# Patient Record
Sex: MALE | Race: WHITE | Employment: OTHER | URBAN - METROPOLITAN AREA
[De-identification: names, ages, dates, MRNs, and addresses within clinical notes are randomized per-mention and may not be internally consistent; named-entity substitution may affect disease eponyms.]

---

## 2021-09-14 ENCOUNTER — HOSPITAL ENCOUNTER (INPATIENT)
Age: 66
LOS: 9 days | Discharge: HOME HEALTH | DRG: 720 | End: 2021-09-23
Attending: EMERGENCY MEDICINE | Admitting: INTERNAL MEDICINE
Payer: COMMERCIAL

## 2021-09-14 ENCOUNTER — APPOINTMENT (OUTPATIENT)
Dept: CT IMAGING | Age: 66
DRG: 720 | End: 2021-09-14
Payer: COMMERCIAL

## 2021-09-14 ENCOUNTER — APPOINTMENT (OUTPATIENT)
Dept: GENERAL RADIOLOGY | Age: 66
DRG: 720 | End: 2021-09-14
Payer: COMMERCIAL

## 2021-09-14 DIAGNOSIS — J12.82 PNEUMONIA DUE TO COVID-19 VIRUS: Primary | ICD-10-CM

## 2021-09-14 DIAGNOSIS — A41.9 SEPTICEMIA (HCC): ICD-10-CM

## 2021-09-14 DIAGNOSIS — J44.9 CHRONIC OBSTRUCTIVE PULMONARY DISEASE, UNSPECIFIED COPD TYPE (HCC): ICD-10-CM

## 2021-09-14 DIAGNOSIS — U07.1 PNEUMONIA DUE TO COVID-19 VIRUS: Primary | ICD-10-CM

## 2021-09-14 PROBLEM — Z20.822 SUSPECTED COVID-19 VIRUS INFECTION: Status: ACTIVE | Noted: 2021-09-14

## 2021-09-14 PROBLEM — J18.9 MULTIFOCAL PNEUMONIA: Status: ACTIVE | Noted: 2021-09-14

## 2021-09-14 PROBLEM — J96.01 ACUTE RESPIRATORY FAILURE WITH HYPOXIA (HCC): Status: ACTIVE | Noted: 2021-09-14

## 2021-09-14 PROBLEM — J96.21 ACUTE ON CHRONIC RESPIRATORY FAILURE WITH HYPOXIA (HCC): Status: ACTIVE | Noted: 2021-09-14

## 2021-09-14 LAB
A/G RATIO: 0.9 (ref 1.1–2.2)
ALBUMIN SERPL-MCNC: 3.7 G/DL (ref 3.4–5)
ALP BLD-CCNC: 93 U/L (ref 40–129)
ALT SERPL-CCNC: 51 U/L (ref 10–40)
ANION GAP SERPL CALCULATED.3IONS-SCNC: 13 MMOL/L (ref 3–16)
AST SERPL-CCNC: 79 U/L (ref 15–37)
BANDED NEUTROPHILS RELATIVE PERCENT: 3 % (ref 0–7)
BASE EXCESS ARTERIAL: -3.1 MMOL/L (ref -3–3)
BASOPHILS ABSOLUTE: 0 K/UL (ref 0–0.2)
BASOPHILS RELATIVE PERCENT: 0 %
BILIRUB SERPL-MCNC: 1.1 MG/DL (ref 0–1)
BUN BLDV-MCNC: 13 MG/DL (ref 7–20)
CALCIUM SERPL-MCNC: 8.7 MG/DL (ref 8.3–10.6)
CARBOXYHEMOGLOBIN ARTERIAL: 1.3 % (ref 0–1.5)
CHLORIDE BLD-SCNC: 87 MMOL/L (ref 99–110)
CO2: 27 MMOL/L (ref 21–32)
CREAT SERPL-MCNC: 0.8 MG/DL (ref 0.8–1.3)
EKG ATRIAL RATE: 98 BPM
EKG DIAGNOSIS: NORMAL
EKG P AXIS: 67 DEGREES
EKG P-R INTERVAL: 144 MS
EKG Q-T INTERVAL: 372 MS
EKG QRS DURATION: 100 MS
EKG QTC CALCULATION (BAZETT): 474 MS
EKG R AXIS: 264 DEGREES
EKG T AXIS: 82 DEGREES
EKG VENTRICULAR RATE: 98 BPM
EOSINOPHILS ABSOLUTE: 0 K/UL (ref 0–0.6)
EOSINOPHILS RELATIVE PERCENT: 0 %
GFR AFRICAN AMERICAN: >60
GFR NON-AFRICAN AMERICAN: >60
GLOBULIN: 4.1 G/DL
GLUCOSE BLD-MCNC: 132 MG/DL (ref 70–99)
HCO3 ARTERIAL: 27.6 MMOL/L (ref 21–29)
HCT VFR BLD CALC: 48.1 % (ref 40.5–52.5)
HEMATOLOGY PATH CONSULT: YES
HEMOGLOBIN, ART, EXTENDED: 16.4 G/DL (ref 13.5–17.5)
HEMOGLOBIN: 16.5 G/DL (ref 13.5–17.5)
INFLUENZA A: NOT DETECTED
INFLUENZA B: NOT DETECTED
LACTIC ACID, SEPSIS: 2 MMOL/L (ref 0.4–1.9)
LACTIC ACID, SEPSIS: 2.1 MMOL/L (ref 0.4–1.9)
LYMPHOCYTES ABSOLUTE: 1.8 K/UL (ref 1–5.1)
LYMPHOCYTES RELATIVE PERCENT: 10 %
MCH RBC QN AUTO: 33.1 PG (ref 26–34)
MCHC RBC AUTO-ENTMCNC: 34.3 G/DL (ref 31–36)
MCV RBC AUTO: 96.7 FL (ref 80–100)
METHEMOGLOBIN ARTERIAL: 0 %
MONOCYTES ABSOLUTE: 2.5 K/UL (ref 0–1.3)
MONOCYTES RELATIVE PERCENT: 14 %
NEUTROPHILS ABSOLUTE: 13.5 K/UL (ref 1.7–7.7)
NEUTROPHILS RELATIVE PERCENT: 73 %
O2 SAT, ARTERIAL: 95.7 %
O2 THERAPY: ABNORMAL
PCO2 ARTERIAL: 75.1 MMHG (ref 35–45)
PDW BLD-RTO: 13.7 % (ref 12.4–15.4)
PH ARTERIAL: 7.18 (ref 7.35–7.45)
PLATELET # BLD: 169 K/UL (ref 135–450)
PLATELET SLIDE REVIEW: ADEQUATE
PMV BLD AUTO: 8.4 FL (ref 5–10.5)
PO2 ARTERIAL: 99.6 MMHG (ref 75–108)
POTASSIUM REFLEX MAGNESIUM: 4.9 MMOL/L (ref 3.5–5.1)
PRO-BNP: 660 PG/ML (ref 0–124)
PROCALCITONIN: 1.02 NG/ML (ref 0–0.15)
RBC # BLD: 4.98 M/UL (ref 4.2–5.9)
SARS-COV-2 RNA, RT PCR: NOT DETECTED
SARS-COV-2, NAAT: NOT DETECTED
SLIDE REVIEW: ABNORMAL
SODIUM BLD-SCNC: 127 MMOL/L (ref 136–145)
TCO2 ARTERIAL: 29.9 MMOL/L
TOTAL PROTEIN: 7.8 G/DL (ref 6.4–8.2)
TROPONIN: <0.01 NG/ML
VACUOLATED NEUTROPHILS: PRESENT
WBC # BLD: 17.7 K/UL (ref 4–11)

## 2021-09-14 PROCEDURE — 96361 HYDRATE IV INFUSION ADD-ON: CPT

## 2021-09-14 PROCEDURE — 93005 ELECTROCARDIOGRAM TRACING: CPT | Performed by: PHYSICIAN ASSISTANT

## 2021-09-14 PROCEDURE — 82077 ASSAY SPEC XCP UR&BREATH IA: CPT

## 2021-09-14 PROCEDURE — 2000000000 HC ICU R&B

## 2021-09-14 PROCEDURE — 6360000002 HC RX W HCPCS: Performed by: EMERGENCY MEDICINE

## 2021-09-14 PROCEDURE — 96375 TX/PRO/DX INJ NEW DRUG ADDON: CPT

## 2021-09-14 PROCEDURE — 0BH17EZ INSERTION OF ENDOTRACHEAL AIRWAY INTO TRACHEA, VIA NATURAL OR ARTIFICIAL OPENING: ICD-10-PCS | Performed by: INTERNAL MEDICINE

## 2021-09-14 PROCEDURE — 6360000002 HC RX W HCPCS: Performed by: INTERNAL MEDICINE

## 2021-09-14 PROCEDURE — 85025 COMPLETE CBC W/AUTO DIFF WBC: CPT

## 2021-09-14 PROCEDURE — 6360000002 HC RX W HCPCS

## 2021-09-14 PROCEDURE — 93010 ELECTROCARDIOGRAM REPORT: CPT | Performed by: INTERNAL MEDICINE

## 2021-09-14 PROCEDURE — 71260 CT THORAX DX C+: CPT

## 2021-09-14 PROCEDURE — 84145 PROCALCITONIN (PCT): CPT

## 2021-09-14 PROCEDURE — 94660 CPAP INITIATION&MGMT: CPT

## 2021-09-14 PROCEDURE — 82803 BLOOD GASES ANY COMBINATION: CPT

## 2021-09-14 PROCEDURE — 83880 ASSAY OF NATRIURETIC PEPTIDE: CPT

## 2021-09-14 PROCEDURE — 80053 COMPREHEN METABOLIC PANEL: CPT

## 2021-09-14 PROCEDURE — 6370000000 HC RX 637 (ALT 250 FOR IP): Performed by: INTERNAL MEDICINE

## 2021-09-14 PROCEDURE — 96374 THER/PROPH/DIAG INJ IV PUSH: CPT

## 2021-09-14 PROCEDURE — 84484 ASSAY OF TROPONIN QUANT: CPT

## 2021-09-14 PROCEDURE — 6360000002 HC RX W HCPCS: Performed by: PHYSICIAN ASSISTANT

## 2021-09-14 PROCEDURE — 96366 THER/PROPH/DIAG IV INF ADDON: CPT

## 2021-09-14 PROCEDURE — 2500000003 HC RX 250 WO HCPCS: Performed by: INTERNAL MEDICINE

## 2021-09-14 PROCEDURE — 36600 WITHDRAWAL OF ARTERIAL BLOOD: CPT

## 2021-09-14 PROCEDURE — 36415 COLL VENOUS BLD VENIPUNCTURE: CPT

## 2021-09-14 PROCEDURE — 2580000003 HC RX 258: Performed by: INTERNAL MEDICINE

## 2021-09-14 PROCEDURE — 94761 N-INVAS EAR/PLS OXIMETRY MLT: CPT

## 2021-09-14 PROCEDURE — 94002 VENT MGMT INPAT INIT DAY: CPT

## 2021-09-14 PROCEDURE — 71045 X-RAY EXAM CHEST 1 VIEW: CPT

## 2021-09-14 PROCEDURE — 99255 IP/OBS CONSLTJ NEW/EST HI 80: CPT | Performed by: INTERNAL MEDICINE

## 2021-09-14 PROCEDURE — 99285 EMERGENCY DEPT VISIT HI MDM: CPT

## 2021-09-14 PROCEDURE — 83605 ASSAY OF LACTIC ACID: CPT

## 2021-09-14 PROCEDURE — 6370000000 HC RX 637 (ALT 250 FOR IP): Performed by: PHYSICIAN ASSISTANT

## 2021-09-14 PROCEDURE — 2500000003 HC RX 250 WO HCPCS

## 2021-09-14 PROCEDURE — 6360000004 HC RX CONTRAST MEDICATION: Performed by: PHYSICIAN ASSISTANT

## 2021-09-14 PROCEDURE — 2580000003 HC RX 258: Performed by: PHYSICIAN ASSISTANT

## 2021-09-14 PROCEDURE — 96365 THER/PROPH/DIAG IV INF INIT: CPT

## 2021-09-14 PROCEDURE — 87635 SARS-COV-2 COVID-19 AMP PRB: CPT

## 2021-09-14 PROCEDURE — 96367 TX/PROPH/DG ADDL SEQ IV INF: CPT

## 2021-09-14 PROCEDURE — 5A1945Z RESPIRATORY VENTILATION, 24-96 CONSECUTIVE HOURS: ICD-10-PCS | Performed by: INTERNAL MEDICINE

## 2021-09-14 PROCEDURE — 94640 AIRWAY INHALATION TREATMENT: CPT

## 2021-09-14 PROCEDURE — 2700000000 HC OXYGEN THERAPY PER DAY

## 2021-09-14 PROCEDURE — 87040 BLOOD CULTURE FOR BACTERIA: CPT

## 2021-09-14 PROCEDURE — 87636 SARSCOV2 & INF A&B AMP PRB: CPT

## 2021-09-14 PROCEDURE — 2580000003 HC RX 258: Performed by: EMERGENCY MEDICINE

## 2021-09-14 RX ORDER — GUAIFENESIN/DEXTROMETHORPHAN 100-10MG/5
5 SYRUP ORAL EVERY 4 HOURS PRN
Status: DISCONTINUED | OUTPATIENT
Start: 2021-09-14 | End: 2021-09-23 | Stop reason: HOSPADM

## 2021-09-14 RX ORDER — ONDANSETRON 4 MG/1
4 TABLET, ORALLY DISINTEGRATING ORAL EVERY 8 HOURS PRN
Status: DISCONTINUED | OUTPATIENT
Start: 2021-09-14 | End: 2021-09-14

## 2021-09-14 RX ORDER — ALBUTEROL SULFATE 2.5 MG/3ML
SOLUTION RESPIRATORY (INHALATION)
Status: DISPENSED
Start: 2021-09-14 | End: 2021-09-15

## 2021-09-14 RX ORDER — LORAZEPAM 2 MG/ML
3 INJECTION INTRAMUSCULAR
Status: DISCONTINUED | OUTPATIENT
Start: 2021-09-14 | End: 2021-09-23 | Stop reason: HOSPADM

## 2021-09-14 RX ORDER — SODIUM CHLORIDE 0.9 % (FLUSH) 0.9 %
5-40 SYRINGE (ML) INJECTION EVERY 12 HOURS SCHEDULED
Status: DISCONTINUED | OUTPATIENT
Start: 2021-09-14 | End: 2021-09-14 | Stop reason: SDUPTHER

## 2021-09-14 RX ORDER — LORAZEPAM 1 MG/1
1 TABLET ORAL
Status: DISCONTINUED | OUTPATIENT
Start: 2021-09-14 | End: 2021-09-23 | Stop reason: HOSPADM

## 2021-09-14 RX ORDER — LORAZEPAM 2 MG/ML
2 INJECTION INTRAMUSCULAR
Status: DISCONTINUED | OUTPATIENT
Start: 2021-09-14 | End: 2021-09-23 | Stop reason: HOSPADM

## 2021-09-14 RX ORDER — LORAZEPAM 2 MG/ML
1 INJECTION INTRAMUSCULAR
Status: DISCONTINUED | OUTPATIENT
Start: 2021-09-14 | End: 2021-09-23 | Stop reason: HOSPADM

## 2021-09-14 RX ORDER — LORAZEPAM 2 MG/ML
INJECTION INTRAMUSCULAR
Status: DISPENSED
Start: 2021-09-14 | End: 2021-09-15

## 2021-09-14 RX ORDER — NICOTINE 21 MG/24HR
1 PATCH, TRANSDERMAL 24 HOURS TRANSDERMAL DAILY
Status: DISCONTINUED | OUTPATIENT
Start: 2021-09-14 | End: 2021-09-23 | Stop reason: HOSPADM

## 2021-09-14 RX ORDER — SODIUM CHLORIDE 0.9 % (FLUSH) 0.9 %
5-40 SYRINGE (ML) INJECTION EVERY 12 HOURS SCHEDULED
Status: DISCONTINUED | OUTPATIENT
Start: 2021-09-14 | End: 2021-09-23 | Stop reason: HOSPADM

## 2021-09-14 RX ORDER — LORAZEPAM 2 MG/ML
1 INJECTION INTRAMUSCULAR ONCE
Status: DISCONTINUED | OUTPATIENT
Start: 2021-09-14 | End: 2021-09-15

## 2021-09-14 RX ORDER — M-VIT,TX,IRON,MINS/CALC/FOLIC 27MG-0.4MG
1 TABLET ORAL DAILY
Status: DISCONTINUED | OUTPATIENT
Start: 2021-09-15 | End: 2021-09-15

## 2021-09-14 RX ORDER — 0.9 % SODIUM CHLORIDE 0.9 %
30 INTRAVENOUS SOLUTION INTRAVENOUS ONCE
Status: COMPLETED | OUTPATIENT
Start: 2021-09-14 | End: 2021-09-14

## 2021-09-14 RX ORDER — SODIUM CHLORIDE 0.9 % (FLUSH) 0.9 %
5-40 SYRINGE (ML) INJECTION PRN
Status: DISCONTINUED | OUTPATIENT
Start: 2021-09-14 | End: 2021-09-14 | Stop reason: SDUPTHER

## 2021-09-14 RX ORDER — SODIUM CHLORIDE 9 MG/ML
25 INJECTION, SOLUTION INTRAVENOUS PRN
Status: DISCONTINUED | OUTPATIENT
Start: 2021-09-14 | End: 2021-09-23 | Stop reason: HOSPADM

## 2021-09-14 RX ORDER — FOLIC ACID 1 MG/1
1 TABLET ORAL DAILY
Status: DISCONTINUED | OUTPATIENT
Start: 2021-09-15 | End: 2021-09-15

## 2021-09-14 RX ORDER — SODIUM CHLORIDE 0.9 % (FLUSH) 0.9 %
5-40 SYRINGE (ML) INJECTION PRN
Status: DISCONTINUED | OUTPATIENT
Start: 2021-09-14 | End: 2021-09-23 | Stop reason: HOSPADM

## 2021-09-14 RX ORDER — ACETAMINOPHEN 650 MG/1
650 SUPPOSITORY RECTAL EVERY 6 HOURS PRN
Status: DISCONTINUED | OUTPATIENT
Start: 2021-09-14 | End: 2021-09-23 | Stop reason: HOSPADM

## 2021-09-14 RX ORDER — IPRATROPIUM BROMIDE AND ALBUTEROL SULFATE 2.5; .5 MG/3ML; MG/3ML
SOLUTION RESPIRATORY (INHALATION)
Status: COMPLETED
Start: 2021-09-14 | End: 2021-09-15

## 2021-09-14 RX ORDER — PROPOFOL 10 MG/ML
5-50 INJECTION, EMULSION INTRAVENOUS
Status: DISCONTINUED | OUTPATIENT
Start: 2021-09-15 | End: 2021-09-18

## 2021-09-14 RX ORDER — LORAZEPAM 1 MG/1
3 TABLET ORAL
Status: DISCONTINUED | OUTPATIENT
Start: 2021-09-14 | End: 2021-09-23 | Stop reason: HOSPADM

## 2021-09-14 RX ORDER — LEVALBUTEROL 1.25 MG/.5ML
0.63 SOLUTION, CONCENTRATE RESPIRATORY (INHALATION)
Status: DISCONTINUED | OUTPATIENT
Start: 2021-09-14 | End: 2021-09-15

## 2021-09-14 RX ORDER — ACETAMINOPHEN 325 MG/1
650 TABLET ORAL EVERY 6 HOURS PRN
Status: DISCONTINUED | OUTPATIENT
Start: 2021-09-14 | End: 2021-09-23 | Stop reason: HOSPADM

## 2021-09-14 RX ORDER — LORAZEPAM 1 MG/1
4 TABLET ORAL
Status: DISCONTINUED | OUTPATIENT
Start: 2021-09-14 | End: 2021-09-23 | Stop reason: HOSPADM

## 2021-09-14 RX ORDER — POLYETHYLENE GLYCOL 3350 17 G/17G
17 POWDER, FOR SOLUTION ORAL DAILY PRN
Status: DISCONTINUED | OUTPATIENT
Start: 2021-09-14 | End: 2021-09-23 | Stop reason: HOSPADM

## 2021-09-14 RX ORDER — PROPOFOL 10 MG/ML
INJECTION, EMULSION INTRAVENOUS
Status: COMPLETED
Start: 2021-09-14 | End: 2021-09-15

## 2021-09-14 RX ORDER — IPRATROPIUM BROMIDE AND ALBUTEROL SULFATE 2.5; .5 MG/3ML; MG/3ML
1 SOLUTION RESPIRATORY (INHALATION) ONCE
Status: COMPLETED | OUTPATIENT
Start: 2021-09-14 | End: 2021-09-14

## 2021-09-14 RX ORDER — LANOLIN ALCOHOL/MO/W.PET/CERES
100 CREAM (GRAM) TOPICAL DAILY
Status: DISCONTINUED | OUTPATIENT
Start: 2021-09-15 | End: 2021-09-15

## 2021-09-14 RX ORDER — SODIUM CHLORIDE 9 MG/ML
INJECTION, SOLUTION INTRAVENOUS CONTINUOUS
Status: DISCONTINUED | OUTPATIENT
Start: 2021-09-14 | End: 2021-09-17

## 2021-09-14 RX ORDER — LORAZEPAM 1 MG/1
2 TABLET ORAL
Status: DISCONTINUED | OUTPATIENT
Start: 2021-09-14 | End: 2021-09-23 | Stop reason: HOSPADM

## 2021-09-14 RX ORDER — ALBUTEROL SULFATE 2.5 MG/3ML
2.5 SOLUTION RESPIRATORY (INHALATION) EVERY 4 HOURS PRN
Status: DISCONTINUED | OUTPATIENT
Start: 2021-09-14 | End: 2021-09-15

## 2021-09-14 RX ORDER — LEVALBUTEROL 1.25 MG/.5ML
SOLUTION, CONCENTRATE RESPIRATORY (INHALATION)
Status: COMPLETED
Start: 2021-09-14 | End: 2021-09-14

## 2021-09-14 RX ORDER — IPRATROPIUM BROMIDE AND ALBUTEROL SULFATE 2.5; .5 MG/3ML; MG/3ML
1 SOLUTION RESPIRATORY (INHALATION) EVERY 4 HOURS
Status: DISCONTINUED | OUTPATIENT
Start: 2021-09-14 | End: 2021-09-19

## 2021-09-14 RX ORDER — PROCHLORPERAZINE EDISYLATE 5 MG/ML
10 INJECTION INTRAMUSCULAR; INTRAVENOUS EVERY 6 HOURS PRN
Status: DISCONTINUED | OUTPATIENT
Start: 2021-09-14 | End: 2021-09-23 | Stop reason: HOSPADM

## 2021-09-14 RX ORDER — ONDANSETRON 2 MG/ML
4 INJECTION INTRAMUSCULAR; INTRAVENOUS EVERY 6 HOURS PRN
Status: DISCONTINUED | OUTPATIENT
Start: 2021-09-14 | End: 2021-09-14

## 2021-09-14 RX ORDER — LORAZEPAM 2 MG/ML
1 INJECTION INTRAMUSCULAR ONCE
Status: COMPLETED | OUTPATIENT
Start: 2021-09-14 | End: 2021-09-14

## 2021-09-14 RX ORDER — LORAZEPAM 2 MG/ML
0.5 INJECTION INTRAMUSCULAR ONCE
Status: COMPLETED | OUTPATIENT
Start: 2021-09-14 | End: 2021-09-14

## 2021-09-14 RX ORDER — LORAZEPAM 2 MG/ML
4 INJECTION INTRAMUSCULAR
Status: DISCONTINUED | OUTPATIENT
Start: 2021-09-14 | End: 2021-09-23 | Stop reason: HOSPADM

## 2021-09-14 RX ORDER — AZITHROMYCIN 250 MG/1
500 TABLET, FILM COATED ORAL DAILY
Status: DISCONTINUED | OUTPATIENT
Start: 2021-09-14 | End: 2021-09-14

## 2021-09-14 RX ORDER — SODIUM CHLORIDE 9 MG/ML
25 INJECTION, SOLUTION INTRAVENOUS PRN
Status: DISCONTINUED | OUTPATIENT
Start: 2021-09-14 | End: 2021-09-14 | Stop reason: SDUPTHER

## 2021-09-14 RX ORDER — METHYLPREDNISOLONE SODIUM SUCCINATE 40 MG/ML
40 INJECTION, POWDER, LYOPHILIZED, FOR SOLUTION INTRAMUSCULAR; INTRAVENOUS EVERY 12 HOURS
Status: DISCONTINUED | OUTPATIENT
Start: 2021-09-14 | End: 2021-09-20

## 2021-09-14 RX ORDER — DEXAMETHASONE SODIUM PHOSPHATE 10 MG/ML
10 INJECTION, SOLUTION INTRAMUSCULAR; INTRAVENOUS ONCE
Status: COMPLETED | OUTPATIENT
Start: 2021-09-14 | End: 2021-09-14

## 2021-09-14 RX ADMIN — LEVALBUTEROL 0.63 MG: 1.25 SOLUTION, CONCENTRATE RESPIRATORY (INHALATION) at 23:17

## 2021-09-14 RX ADMIN — DEXAMETHASONE SODIUM PHOSPHATE 10 MG: 10 INJECTION, SOLUTION INTRAMUSCULAR; INTRAVENOUS at 14:18

## 2021-09-14 RX ADMIN — METHYLPREDNISOLONE SODIUM SUCCINATE 40 MG: 40 INJECTION, POWDER, FOR SOLUTION INTRAMUSCULAR; INTRAVENOUS at 23:30

## 2021-09-14 RX ADMIN — ROCURONIUM BROMIDE 70 MG: 10 SOLUTION INTRAVENOUS at 23:53

## 2021-09-14 RX ADMIN — VANCOMYCIN HYDROCHLORIDE 1500 MG: 500 INJECTION, POWDER, LYOPHILIZED, FOR SOLUTION INTRAVENOUS at 15:37

## 2021-09-14 RX ADMIN — IOPAMIDOL 85 ML: 755 INJECTION, SOLUTION INTRAVENOUS at 13:50

## 2021-09-14 RX ADMIN — CEFEPIME 2000 MG: 2 INJECTION, POWDER, FOR SOLUTION INTRAVENOUS at 14:54

## 2021-09-14 RX ADMIN — LORAZEPAM 0.5 MG: 2 INJECTION INTRAMUSCULAR; INTRAVENOUS at 14:13

## 2021-09-14 RX ADMIN — DEXMEDETOMIDINE 0.4 MCG/KG/HR: 100 INJECTION, SOLUTION, CONCENTRATE INTRAVENOUS at 22:52

## 2021-09-14 RX ADMIN — KETAMINE HYDROCHLORIDE 100 MG: 50 INJECTION INTRAMUSCULAR; INTRAVENOUS at 23:50

## 2021-09-14 RX ADMIN — SODIUM CHLORIDE 3267 ML: 9 INJECTION, SOLUTION INTRAVENOUS at 13:38

## 2021-09-14 RX ADMIN — IPRATROPIUM BROMIDE AND ALBUTEROL SULFATE 1 AMPULE: .5; 3 SOLUTION RESPIRATORY (INHALATION) at 14:18

## 2021-09-14 RX ADMIN — IPRATROPIUM BROMIDE AND ALBUTEROL SULFATE 1 AMPULE: .5; 3 SOLUTION RESPIRATORY (INHALATION) at 21:19

## 2021-09-14 RX ADMIN — LORAZEPAM 1 MG: 2 INJECTION INTRAMUSCULAR; INTRAVENOUS at 23:15

## 2021-09-14 ASSESSMENT — ENCOUNTER SYMPTOMS
SHORTNESS OF BREATH: 1
NAUSEA: 1
DIARRHEA: 1
COUGH: 1

## 2021-09-14 ASSESSMENT — PAIN SCALES - GENERAL: PAINLEVEL_OUTOF10: 0

## 2021-09-14 NOTE — ED NOTES
6040- Call placed to Pul for inpatient consult.  Dr. Starr Szymanski, Added to treatment team      Jaswinder Rand  09/14/21 [address]  09/14/21 8095

## 2021-09-14 NOTE — ED PROVIDER NOTES
[address] ED  EMERGENCY DEPARTMENT ENCOUNTER        Pt Name: Joan Donis  MRN: [id number]  [place] 1955  Date of evaluation: 9/14/2021  Provider: Oliver Prince PA-C  PCP: No primary care provider on file. Note Started: 12:33 PM EDT        I have seen and evaluated this patient with my supervising physician Katie Thibodeaux MD.    [address] [facility] [place]       Chief Complaint   Patient presents with    Shortness of Breath     for 3 days       HISTORY OF PRESENT ILLNESS   (Location, Timing/Onset, Context/Setting, Quality, Duration, Modifying Factors, Severity, Associated Signs and Symptoms)  Note limiting factors. Chief Complaint: Shortness of breath; cough; fevers; chills    Joan Donis is a 61 y.o. male with a past medical history of tobacco use brought in today by EMS from home for evaluation of shortness of breath with a productive cough. EMS found him to have a pulse oxygen of 88%. He was placed on 3 L nasal cannula. He reports a productive cough as well as intermittent fevers and chills at home and reports nausea and watery diarrhea. Onset of symptoms over the past 3 days. Duration of symptoms have been persistent since onset. Context includes shortness of breath with a productive cough fevers and chills. Denies any chest pain at this time. No aggravating complaints. No alleviating complaints. He has not tried anything at home for symptomatic relief. Nothing seems to make symptoms better or worse. Nursing Notes were all reviewed and agreed with or any disagreements were addressed in the HPI. REVIEW OF SYSTEMS    (2-9 systems for level 4, 10 or more for level 5)     Review of Systems   Constitutional: Negative. HENT: Negative. Respiratory: Positive for cough and shortness of breath. Cardiovascular: Negative. Gastrointestinal: Positive for diarrhea and nausea. Genitourinary: Negative. Musculoskeletal: Negative. Skin: Negative. Neurological: Negative. Positives and Pertinent negatives as per HPI. Except as noted above in the ROS, all other systems were reviewed and negative. PAST MEDICAL HISTORY   History reviewed. No pertinent past medical history. SURGICAL HISTORY   History reviewed. No pertinent surgical history. CURRENTMEDICATIONS       Previous Medications    No medications on file         ALLERGIES     Patient has no known allergies. FAMILYHISTORY     History reviewed. No pertinent family history. SOCIAL HISTORY       Social History     Tobacco Use    Smoking status: Current Every Day Smoker   Substance Use Topics    Alcohol use: Not on file    Drug use: Not on file       SCREENINGS    Anson Coma Scale  Eye Opening: Spontaneous  Best Verbal Response: Oriented  Best Motor Response: Obeys commands  Anson Coma Scale Score: 15        PHYSICAL EXAM    (up to 7 for level 4, 8 or more for level 5)     ED Triage Vitals [09/14/21 1220]   BP Temp Temp Source Pulse Resp SpO2 Height Weight   (!) 213/92 97.8 °F (36.6 °C) Oral 111 26 91 % 5' 10\" (1.778 m) 240 lb (108.9 kg)       Physical Exam  Vitals and nursing note reviewed. Constitutional:       General: He is awake. He is not in acute distress. Appearance: Normal appearance. He is well-developed. He is obese. He is ill-appearing. He is not toxic-appearing or diaphoretic. Interventions: Nasal cannula in place. Comments: 3 L NC   HENT:      Head: Normocephalic and atraumatic. Nose: Nose normal.   Eyes:      General:         Right eye: No discharge. Left eye: No discharge. Cardiovascular:      Rate and Rhythm: Regular rhythm. Tachycardia present. Pulses:           Radial pulses are 2+ on the right side and 2+ on the left side. Heart sounds: Normal heart sounds. No murmur heard. No gallop. Pulmonary:      Effort: Pulmonary effort is normal. Tachypnea present. No respiratory distress.       Breath sounds: Decreased breath sounds and wheezing present. No rhonchi or rales. Chest:      Chest wall: No tenderness. Musculoskeletal:         General: No deformity. Normal range of motion. Cervical back: Normal range of motion and neck supple. Right lower leg: No edema. Left lower leg: No edema. Skin:     General: Skin is warm and dry. Neurological:      General: No focal deficit present. Mental Status: He is alert and oriented to person, place, and time. GCS: GCS eye subscore is 4. GCS verbal subscore is 5. GCS motor subscore is 6. Psychiatric:         Behavior: Behavior normal. Behavior is cooperative.          DIAGNOSTIC RESULTS   LABS:    Labs Reviewed   CBC WITH AUTO DIFFERENTIAL - Abnormal; Notable for the following components:       Result Value    WBC 17.7 (*)     Neutrophils Absolute 13.5 (*)     Monocytes Absolute 2.5 (*)     Vacuolated Neutrophils Present (*)     All other components within normal limits    Narrative:     Performed at:  [place] [address],  [facility]   Phone [phone]   COMPREHENSIVE METABOLIC PANEL W/ REFLEX TO MG FOR LOW K - Abnormal; Notable for the following components:    Sodium 127 (*)     Chloride 87 (*)     Glucose 132 (*)     Albumin/Globulin Ratio 0.9 (*)     Total Bilirubin 1.1 (*)     ALT 51 (*)     AST 79 (*)     All other components within normal limits    Narrative:     Performed at:  [place] [address],  [employer] [facility] [employer]   Phone [phone]   BRAIN NATRIURETIC PEPTIDE - Abnormal; Notable for the following components:    Pro- (*)     All other components within normal limits    Narrative:     Performed at:  [facility] [address],  [place], [facility] [employer]   Phone [phone]   LACTATE, SEPSIS - Abnormal; Notable for the following components:    Lactic Acid, Sepsis 2.1 (*)     All other components within normal limits    Narrative:     Performed at:  [address] [facility] [address],  [place], [facility]   Phone [phone]   PROCALCITONIN - Abnormal; Notable for the following components:    Procalcitonin 1.02 (*)     All other components within normal limits    Narrative:     Performed at:  [facility] [place] [facility],  [place], [facility]   Phone [phone], RAPID    Narrative:     Performed at:  [facility] [place] [place] [facility],  [place], [facility]   Phone [phone]   CULTURE, BLOOD 1   CULTURE, BLOOD 2   TROPONIN    Narrative:     Performed at:  [facility] [place] [place] [facility],  [place], [facility]   Phone [phone]   BLOOD GAS, VENOUS   LACTATE, SEPSIS   COVID-19       When ordered only abnormal lab results are displayed. All other labs were within normal range or not returned as of this dictation. EKG: When ordered, EKG's are interpreted by the Emergency Department Physician in the absence of a cardiologist.  Please see their note for interpretation of EKG. RADIOLOGY:   Non-plain film images such as CT, Ultrasound and MRI are read by the radiologist. Plain radiographic images are visualized and preliminarily interpreted by the ED Provider with the below findings:        Interpretation per the Radiologist below, if available at the time of this note:    CT CHEST PULMONARY EMBOLISM W CONTRAST   Final Result   No evidence of pulmonary embolism. COVID-19 pneumonia as described above. Irregular right upper lobe mass as measured above. RECOMMENDATIONS:   Chest CT in 3 months is recommended for follow-up evaluation of right upper   lobe irregular lesion. XR CHEST PORTABLE   Final Result   1. Nodular/ill-defined opacity in the right upper lung zone.   Recommend   dedicated noncontrast CT for further evaluation and to exclude a my attending. CBC shows leukocytosis of 17.7. Hemoglobin of 16.5. Lactic acid of 2.1. Patient given septic fluids. Chest x-ray shows a nodular/ill-defined opacity in the right upper lung zone recommended dedicated noncontrast CT for further evaluation and to exclude a nodule. Patchy right-sided pulmonary opacities are noted which could represent multifocal pneumonia atelectasis or atypical edema small bilateral pleural fluid. COVID-19 is negative. BNP unremarkable. Blood cultures were sent. Troponin less than 0.01. Procalcitonin elevated at 1.02.  CT PE study shows no evidence of PE. EKG reviewed by my attending see note for dictation. Covid pneumonia as described above irregular right upper lobe mass is measured. Right lower lobe consolidation and scattered bilateral groundglass opacities consistent with Covid pneumonia. Panlobular emphysema no pleural effusion or pneumothorax. Patient received fluids and prophylactic septic antibiotics. Sepsis initiated at 1400. We will plan to admit. I spoke to Maryjo JIMENEZ with hospitalist team who did accept this admission. Patient stable at time of admission. We will also send PCR Covid. FINAL IMPRESSION      1. Pneumonia due to COVID-19 virus    2. Septicemia [facility])          DISPOSITION/PLAN   DISPOSITION Decision To Admit 09/14/2021 03:02:35 PM      PATIENT REFERRED TO:  No follow-up provider specified.     DISCHARGE MEDICATIONS:  New Prescriptions    No medications on file       DISCONTINUED MEDICATIONS:  Discontinued Medications    No medications on file              (Please note that portions of this note were completed with a voice recognition program.  Efforts were made to edit the dictations but occasionally words are mis-transcribed.)    Bushra Valadez PA-C (electronically signed)            Bushra Valadez PA-C  09/14/21 2285

## 2021-09-14 NOTE — CONSULTS
Patient is being seen at the request of Jabari Salazar for a consultation for shortness of breath     HISTORY OF PRESENT ILLNESS: 62 yo who presented to the ED on 9/14/21 with 3 day h/o severe nausea, emesis, diarrhea associated with severe shortness of breath. No similar prior episodes. He has chronic loss of smell, has had no additional loss of taste. He does have some baseline shortness of breath, and occasionally uses a friend's inhaler. Testing shows COVID negative; I favor bacterial pneumonia, but cannot rule out COVID pneumonia. PAST MEDICAL HISTORY:  History reviewed. No pertinent past medical history. PAST SURGICAL HISTORY:  History reviewed. No pertinent surgical history. FAMILY HISTORY:  No known early lung disease    SOCIAL HISTORY:   reports that he has been smoking. He does not have any smokeless tobacco history on file. Scheduled Meds:    Continuous Infusions:    PRN Meds:      ALLERGIES:  Patient has No Known Allergies. REVIEW OF SYSTEMS:  Constitutional: + fever  HENT: Negative for sore throat  Eyes: Negative for redness   Respiratory: + for dyspnea, cough  Cardiovascular: Negative for chest pain  Gastrointestinal: + for vomiting, diarrhea   Genitourinary: Negative for hematuria   Musculoskeletal: Negative for arthralgias   Skin: Negative for rash  Neurological: Negative for syncope  Hematological: Negative for adenopathy  Psychiatric/Behavorial: Negative for anxiety    PHYSICAL EXAM:  Blood pressure (!) 168/70, pulse 98, temperature 97.8 °F (36.6 °C), temperature source Oral, resp. rate (!) 32, height 5' 10\" (1.778 m), weight 240 lb (108.9 kg), SpO2 98 %.' on 4 L  Gen: Moderate distress, clear increased work of breathing  Eyes: PERRL. No sclera icterus. No conjunctival injection. ENT: No discharge. Pharynx clear. Neck: Trachea midline. No obvious mass. Resp: ++ accessory muscle use. No crackles. + wheezes. No rhonchi. No dullness on percussion.   Very prolonged expiratory phase  CV: Regular rate. Regular rhythm. No murmur or rub. No edema. Peripheral pulses are 2+. Capillary refill is less than 3 seconds. GI: Non-tender. Non-distended. No hernia. Skin: Warm and dry. No nodule on exposed extremities. Lymph: No cervical LAD. No supraclavicular LAD. M/S: No cyanosis. No joint deformity. No clubbing. Neuro: Awake. Alert. Moves all four extremities. Psych: Oriented x 3. + anxiety. LABS:  CBC:   Recent Labs     09/14/21  1230   WBC 17.7*   HGB 16.5   HCT 48.1   MCV 96.7        BMP:   Recent Labs     09/14/21  1230   *   K 4.9   CL 87*   CO2 27   BUN 13   CREATININE 0.8     LIVER PROFILE:   Recent Labs     09/14/21  1230   AST 79*   ALT 51*   BILITOT 1.1*   ALKPHOS 93     PT/INR: No results for input(s): PROTIME, INR in the last 72 hours. APTT: No results for input(s): APTT in the last 72 hours. UA:No results for input(s): NITRITE, COLORU, PHUR, LABCAST, WBCUA, RBCUA, MUCUS, TRICHOMONAS, YEAST, BACTERIA, CLARITYU, SPECGRAV, LEUKOCYTESUR, UROBILINOGEN, BILIRUBINUR, BLOODU, GLUCOSEU, AMORPHOUS in the last 72 hours. Invalid input(s): KETONESU  No results for input(s): PHART, NLX2CAS, PO2ART in the last 72 hours. Chest imaging was reviewed by me and showed   9/14/21 CTPA   Pulmonary Arteries: Pulmonary arteries are adequately opacified for   evaluation.  No evidence of intraluminal filling defect to suggest pulmonary   embolism.  Main pulmonary artery is normal in caliber.       Mediastinum: The central airways are clear.  Right hilar lymphadenopathy   measuring 2.3 x 1.1 cm (2, 135).  The heart and pericardium demonstrate no   acute abnormality.  Atherosclerotic disease of the thoracic aorta.       Lungs/pleura: Irregular right upper lobe nodule measures 2.4 x 1.5 cm (2,   51).  Right lower lobe consolidation and scattered bilateral ground-glass   opacities, consistent with COVID-19 pneumonia.  Panlobular emphysema.  No   pleural effusion or pneumothorax.     Upper Abdomen: Right adrenal gland nodule measuring 1.6 cm.  Left adrenal   gland hyperplasia.  Rest of visualized upper abdomen is unremarkable.       Soft Tissues/Bones: Degenerate disease of the thoracic spine.  No focal   osseous lesion.  Visualized soft tissues are unremarkable.           Impression   No evidence of pulmonary embolism.       COVID-19 pneumonia as described above.       Irregular right upper lobe mass as measured above. ASSESSMENT:  · Community acquired pneumonia -Covid pneumonia is in the differential, however with elevated procalcitonin, elevated white count and negative SARS-CoV-2 testing, I favor bacterial CAP. I feel CT imaging is also most consistent with bacterial bronchopneumonia   · 2.4 cm right upper lobe pulmonary nodule, appears concerning for malignancy. He has a remote history of a pulmonary nodule, he thinks sometime in the 90s, he thinks it was on the left side. · Right hilar lymphadenopathy, concerning for malignancy  · Right adrenal nodule 1.6 cm    PLAN:  Supplemental oxygen to maintain SaO2 >92%; wean as tolerated    Inhaled bronchodilators now, may need to use BiPAP for work of breathing. I d/w RT at bedside.   Treatments initiated   STAT ABG  CTX/Azithromycin D#1  IV solumedrol with plan to convert to oral prednisone with improvement  Tobacco cessation is recommended   ·  Will ask radiology to review the adrenal nodule  · Probable outpatient CT PET for suspicious Pulmonary Nodule/hilar lymphadenopathy   · I d/w Dr. Kelvin Price

## 2021-09-14 NOTE — Clinical Note
Patient Class: Inpatient [101]   REQUIRED: Diagnosis: Acute on chronic respiratory failure with hypoxia ([facility] [[id number]]   Estimated Length of Stay: Estimated stay of more than 2 midnights   Telemetry/Cardiac Monitoring Required?: Yes

## 2021-09-14 NOTE — ED TRIAGE NOTES
Pt brought in by [employer] from home. Pt called squad for SOB for 3 days. Pt was on 3 liters of someone else's oxygen at home when squad arrived, wa 85%.  Squad placed on 4 liters and saturation at 94%

## 2021-09-14 NOTE — PROGRESS NOTES
Pt moved to room 18, placed on monitor and telemetry. Report given to [facility].

## 2021-09-14 NOTE — ED PROVIDER NOTES
Emergency Department Provider Note     Location: [place] [address] ED  9/14/2021    I independently performed a history and physical on [place]. All diagnostic, treatment, and disposition decisions were made by myself in conjunction with the mid-level provider. Briefly, this is a 61 y.o. male here for SOB, productive cough x 3 days with associated fever, chills, nausea, and diarrhea. When EMS arrived, he was hypoxic on room air at rest. Patient is not vaccinated for COVID and denies exposure to confirmed cases of COVID. No chest pain. No rash. ED Triage Vitals [09/14/21 1220]   BP Temp Temp Source Pulse Resp SpO2 Height Weight   (!) 213/92 97.8 °F (36.6 °C) Oral 111 26 91 % 5' 10\" (1.778 m) 240 lb (108.9 kg)        Exam showed WDWN male NAD, ACOx3, heart tachycardic but regular, lungs diminished throughout, no LE edema. Patient seen and examined in room 18. EKG  The Ekg interpreted by me in the absence of a cardiologist shows. normal sinus rhythm with a rate of 98  Axis is   Right superior axis deviation  QTc is  within an acceptable range  Incomplete RBBB  No specific ST-T wave changes appreciated. No evidence of acute ischemia. No prior EKG available for comparison      Radiology  XR CHEST PORTABLE    Result Date: 9/14/2021  EXAMINATION: ONE XRAY VIEW OF THE CHEST 9/14/2021 1:06 pm COMPARISON: None. HISTORY: ORDERING SYSTEM PROVIDED HISTORY: SOB TECHNOLOGIST PROVIDED HISTORY: Reason for exam:->SOB Reason for Exam: SOB Acuity: Acute Type of Exam: Initial FINDINGS: Patchy opacities noted in the right mid to lower lung zone. Hazy opacities are seen at the right lung base and right mid lung zone. There is a 13 mm nodular opacity in the right upper lung zone, which becomes ill-defined on 1 of the views. Cardiac silhouette is enlarged. No discrete pneumothorax. Trace blunting of the costophrenic angles bilaterally. 1. Nodular/ill-defined opacity in the right upper lung zone. Recommend dedicated noncontrast CT for further evaluation and to exclude a nodule. 2. Patchy right-sided pulmonary opacities are noted, which could represent multifocal pneumonia, atelectasis or atypical edema. 3. Small bilateral pleural fluid. CT CHEST PULMONARY EMBOLISM W CONTRAST    Result Date: 9/14/2021  EXAMINATION: CTA OF THE CHEST 9/14/2021 1:50 pm TECHNIQUE: CTA of the chest was performed after the administration of intravenous contrast.  Multiplanar reformatted images are provided for review. MIP images are provided for review. Dose modulation, iterative reconstruction, and/or weight based adjustment of the mA/kV was utilized to reduce the radiation dose to as low as reasonably achievable. COMPARISON: Chest x-ray dated 09/14/2021 HISTORY: ORDERING SYSTEM PROVIDED HISTORY: SOB TECHNOLOGIST PROVIDED HISTORY: Reason for exam:->SOB Decision Support Exception - unselect if not a suspected or confirmed emergency medical condition->Emergency Medical Condition (MA) Reason for Exam: sob ,   - covid Acuity: Acute Type of Exam: Initial FINDINGS: Pulmonary Arteries: Pulmonary arteries are adequately opacified for evaluation. No evidence of intraluminal filling defect to suggest pulmonary embolism. Main pulmonary artery is normal in caliber. Mediastinum: The central airways are clear. Right hilar lymphadenopathy measuring 2.3 x 1.1 cm (2, 135). The heart and pericardium demonstrate no acute abnormality. Atherosclerotic disease of the thoracic aorta. Lungs/pleura: Irregular right upper lobe nodule measures 2.4 x 1.5 cm (2, 51). Right lower lobe consolidation and scattered bilateral ground-glass opacities, consistent with COVID-19 pneumonia. Panlobular emphysema. No pleural effusion or pneumothorax. Upper Abdomen: Right adrenal gland nodule measuring 1.6 cm. Left adrenal gland hyperplasia. Rest of visualized upper abdomen is unremarkable. Soft Tissues/Bones: Degenerate disease of the thoracic spine.   No focal osseous lesion. Visualized soft tissues are unremarkable. No evidence of pulmonary embolism. COVID-19 pneumonia as described above. Irregular right upper lobe mass as measured above. RECOMMENDATIONS: Chest CT in 3 months is recommended for follow-up evaluation of right upper lobe irregular lesion. Lab reviewed. Pertinent for rapid COVID negative, Na 127, lactic acid 2.1. troponin < 0.01. AST/ALT slightly elevated but clinically significant. WBC 17.7. Discussed with patient regarding the possibility of false negative rapid COVID test given findings on CT. We also discussed the lung mass and the importance of repeat imaging in 3 months. Patient is a smoker. Given hypoxia with persistent O2 requirement, will admit for acute respiratory failure with hypoxia. For further details of [facility] emergency department encounter, please see TOBI Rey's documentation. Total critical care time is 15 minutes, which excludes separately billable procedures and updating family. Time spent is specifically for management of the presenting complaint and symptoms initially, direct bedside care, reevaluation, review of records, and consultation. There was a high probability of clinically significant life-threatening deterioration in the patient's condition, which required my urgent intervention. This chart was generated in part by using Dragon Dictation system and may contain errors related to that system including errors in grammar, punctuation, and spelling, as well as words and phrases that may be inappropriate. If there are any questions or concerns please feel free to contact the dictating provider for clarification.           Jesus Patel MD  09/14/21 7411

## 2021-09-14 NOTE — ED NOTES
1459- Perfect serve sent to Dr. Michelle Machado for admission '    Call was returned by Yanci Bazan and spoke to Andre   09/14/21 9940

## 2021-09-14 NOTE — PLAN OF CARE
Acute hypoxic respiratory failure  Multifocal PNA- imaging concerning for COVID 19  COVID 19 NAAT is neg. PCR is PENDING.   Droplet + isolation  Sepsis, Elevated PCT- Rocephin and Zithromax  RUL mass- pulm c/s and will need f/u imaging in future    Admit to med surg

## 2021-09-14 NOTE — CONSULTS
Pharmacy to dose vancomycin per Dr. Manpreet Harrington. Please give vancomycin 1500 mg ivpb x1 dose now. Further dosing per pharmacy as requested if patient is to be admitted to hospital.  [facility]. Ph.  9/14/2021 2:18 PM

## 2021-09-14 NOTE — ED NOTES
BC #1 collected from [facility] using aseptic technique, site prepped with chlorhexidine for 30 seconds and noted to dry for 30 seconds. BC #2 collected from [facility] using aseptic technique, site prepped with chlorhexidine for 30 seconds and noted to dry for 30 seconds. Tubes labeled appropriately and sent to lab.       Dick Oakes RN  09/14/21 7136

## 2021-09-15 ENCOUNTER — APPOINTMENT (OUTPATIENT)
Dept: GENERAL RADIOLOGY | Age: 66
DRG: 720 | End: 2021-09-15
Payer: COMMERCIAL

## 2021-09-15 LAB
A/G RATIO: 1 (ref 1.1–2.2)
ALBUMIN SERPL-MCNC: 3.1 G/DL (ref 3.4–5)
ALP BLD-CCNC: 82 U/L (ref 40–129)
ALT SERPL-CCNC: 55 U/L (ref 10–40)
AMORPHOUS: ABNORMAL /HPF
ANION GAP SERPL CALCULATED.3IONS-SCNC: 13 MMOL/L (ref 3–16)
AST SERPL-CCNC: 89 U/L (ref 15–37)
BACTERIA: ABNORMAL /HPF
BASE EXCESS ARTERIAL: -3.1 MMOL/L (ref -3–3)
BASE EXCESS ARTERIAL: 0.4 MMOL/L (ref -3–3)
BASOPHILS ABSOLUTE: 0 K/UL (ref 0–0.2)
BASOPHILS RELATIVE PERCENT: 0.1 %
BILIRUB SERPL-MCNC: 0.4 MG/DL (ref 0–1)
BILIRUBIN URINE: NEGATIVE
BLOOD, URINE: ABNORMAL
BUN BLDV-MCNC: 17 MG/DL (ref 7–20)
CALCIUM SERPL-MCNC: 6.8 MG/DL (ref 8.3–10.6)
CARBOXYHEMOGLOBIN ARTERIAL: 0.6 % (ref 0–1.5)
CARBOXYHEMOGLOBIN ARTERIAL: 0.8 % (ref 0–1.5)
CHLORIDE BLD-SCNC: 96 MMOL/L (ref 99–110)
CLARITY: CLEAR
CO2: 21 MMOL/L (ref 21–32)
COLOR: YELLOW
CREAT SERPL-MCNC: 0.8 MG/DL (ref 0.8–1.3)
EKG ATRIAL RATE: 120 BPM
EKG DIAGNOSIS: NORMAL
EKG Q-T INTERVAL: 304 MS
EKG QRS DURATION: 86 MS
EKG QTC CALCULATION (BAZETT): 450 MS
EKG R AXIS: 270 DEGREES
EKG T AXIS: 74 DEGREES
EKG VENTRICULAR RATE: 132 BPM
EOSINOPHILS ABSOLUTE: 0 K/UL (ref 0–0.6)
EOSINOPHILS RELATIVE PERCENT: 0 %
ETHANOL: NORMAL MG/DL (ref 0–0.08)
GFR AFRICAN AMERICAN: >60
GFR NON-AFRICAN AMERICAN: >60
GLOBULIN: 3 G/DL
GLUCOSE BLD-MCNC: 199 MG/DL (ref 70–99)
GLUCOSE BLD-MCNC: 221 MG/DL (ref 70–99)
GLUCOSE URINE: NEGATIVE MG/DL
HCO3 ARTERIAL: 23.4 MMOL/L (ref 21–29)
HCO3 ARTERIAL: 32.5 MMOL/L (ref 21–29)
HCT VFR BLD CALC: 41.7 % (ref 40.5–52.5)
HEMOGLOBIN, ART, EXTENDED: 14.4 G/DL (ref 13.5–17.5)
HEMOGLOBIN, ART, EXTENDED: 14.7 G/DL (ref 13.5–17.5)
HEMOGLOBIN: 14 G/DL (ref 13.5–17.5)
HYALINE CASTS: ABNORMAL /LPF (ref 0–2)
KETONES, URINE: NEGATIVE MG/DL
L. PNEUMOPHILA SEROGP 1 UR AG: NORMAL
LACTIC ACID: 2.1 MMOL/L (ref 0.4–2)
LEUKOCYTE ESTERASE, URINE: NEGATIVE
LYMPHOCYTES ABSOLUTE: 0.3 K/UL (ref 1–5.1)
LYMPHOCYTES RELATIVE PERCENT: 3.7 %
MCH RBC QN AUTO: 32.9 PG (ref 26–34)
MCHC RBC AUTO-ENTMCNC: 33.5 G/DL (ref 31–36)
MCV RBC AUTO: 98.3 FL (ref 80–100)
METHEMOGLOBIN ARTERIAL: 0.3 %
METHEMOGLOBIN ARTERIAL: 0.3 %
MICROSCOPIC EXAMINATION: YES
MONOCYTES ABSOLUTE: 0.5 K/UL (ref 0–1.3)
MONOCYTES RELATIVE PERCENT: 6.3 %
MUCUS: ABNORMAL /LPF
NEUTROPHILS ABSOLUTE: 7.5 K/UL (ref 1.7–7.7)
NEUTROPHILS RELATIVE PERCENT: 89.9 %
NITRITE, URINE: NEGATIVE
O2 SAT, ARTERIAL: 50.4 %
O2 SAT, ARTERIAL: 95.2 %
O2 THERAPY: ABNORMAL
O2 THERAPY: ABNORMAL
PCO2 ARTERIAL: 46.9 MMHG (ref 35–45)
PCO2 ARTERIAL: 95.2 MMHG (ref 35–45)
PDW BLD-RTO: 13.6 % (ref 12.4–15.4)
PERFORMED ON: ABNORMAL
PH ARTERIAL: 7.15 (ref 7.35–7.45)
PH ARTERIAL: 7.32 (ref 7.35–7.45)
PH UA: 6 (ref 5–8)
PLATELET # BLD: 148 K/UL (ref 135–450)
PMV BLD AUTO: 8.7 FL (ref 5–10.5)
PO2 ARTERIAL: 35.7 MMHG (ref 75–108)
PO2 ARTERIAL: 82.7 MMHG (ref 75–108)
POTASSIUM REFLEX MAGNESIUM: 4.3 MMOL/L (ref 3.5–5.1)
PROCALCITONIN: 1.48 NG/ML (ref 0–0.15)
PROTEIN UA: 100 MG/DL
RBC # BLD: 4.24 M/UL (ref 4.2–5.9)
RBC UA: ABNORMAL /HPF (ref 0–4)
SODIUM BLD-SCNC: 130 MMOL/L (ref 136–145)
SPECIFIC GRAVITY UA: 1.02 (ref 1–1.03)
STREP PNEUMONIAE ANTIGEN, URINE: NORMAL
TCO2 ARTERIAL: 24.8 MMOL/L
TCO2 ARTERIAL: 35.4 MMOL/L
TOTAL PROTEIN: 6.1 G/DL (ref 6.4–8.2)
TSH REFLEX: 0.42 UIU/ML (ref 0.27–4.2)
URINE REFLEX TO CULTURE: ABNORMAL
URINE TYPE: ABNORMAL
UROBILINOGEN, URINE: 1 E.U./DL
WBC # BLD: 8.4 K/UL (ref 4–11)
WBC UA: ABNORMAL /HPF (ref 0–5)

## 2021-09-15 PROCEDURE — 83036 HEMOGLOBIN GLYCOSYLATED A1C: CPT

## 2021-09-15 PROCEDURE — 2580000003 HC RX 258: Performed by: PHYSICIAN ASSISTANT

## 2021-09-15 PROCEDURE — 6360000002 HC RX W HCPCS: Performed by: INTERNAL MEDICINE

## 2021-09-15 PROCEDURE — 71045 X-RAY EXAM CHEST 1 VIEW: CPT

## 2021-09-15 PROCEDURE — 2500000003 HC RX 250 WO HCPCS: Performed by: INTERNAL MEDICINE

## 2021-09-15 PROCEDURE — 2580000003 HC RX 258: Performed by: INTERNAL MEDICINE

## 2021-09-15 PROCEDURE — 83605 ASSAY OF LACTIC ACID: CPT

## 2021-09-15 PROCEDURE — 87205 SMEAR GRAM STAIN: CPT

## 2021-09-15 PROCEDURE — 82803 BLOOD GASES ANY COMBINATION: CPT

## 2021-09-15 PROCEDURE — 80053 COMPREHEN METABOLIC PANEL: CPT

## 2021-09-15 PROCEDURE — 87186 SC STD MICRODIL/AGAR DIL: CPT

## 2021-09-15 PROCEDURE — 87070 CULTURE OTHR SPECIMN AEROBIC: CPT

## 2021-09-15 PROCEDURE — 36600 WITHDRAWAL OF ARTERIAL BLOOD: CPT

## 2021-09-15 PROCEDURE — 87077 CULTURE AEROBIC IDENTIFY: CPT

## 2021-09-15 PROCEDURE — 6370000000 HC RX 637 (ALT 250 FOR IP): Performed by: PHYSICIAN ASSISTANT

## 2021-09-15 PROCEDURE — 6360000002 HC RX W HCPCS

## 2021-09-15 PROCEDURE — 99291 CRITICAL CARE FIRST HOUR: CPT | Performed by: INTERNAL MEDICINE

## 2021-09-15 PROCEDURE — 94660 CPAP INITIATION&MGMT: CPT

## 2021-09-15 PROCEDURE — 81001 URINALYSIS AUTO W/SCOPE: CPT

## 2021-09-15 PROCEDURE — 99233 SBSQ HOSP IP/OBS HIGH 50: CPT | Performed by: INTERNAL MEDICINE

## 2021-09-15 PROCEDURE — C9113 INJ PANTOPRAZOLE SODIUM, VIA: HCPCS | Performed by: INTERNAL MEDICINE

## 2021-09-15 PROCEDURE — 2000000000 HC ICU R&B

## 2021-09-15 PROCEDURE — 6370000000 HC RX 637 (ALT 250 FOR IP): Performed by: INTERNAL MEDICINE

## 2021-09-15 PROCEDURE — 36415 COLL VENOUS BLD VENIPUNCTURE: CPT

## 2021-09-15 PROCEDURE — 2700000000 HC OXYGEN THERAPY PER DAY

## 2021-09-15 PROCEDURE — 93010 ELECTROCARDIOGRAM REPORT: CPT | Performed by: INTERNAL MEDICINE

## 2021-09-15 PROCEDURE — 6370000000 HC RX 637 (ALT 250 FOR IP)

## 2021-09-15 PROCEDURE — 85025 COMPLETE CBC W/AUTO DIFF WBC: CPT

## 2021-09-15 PROCEDURE — 94640 AIRWAY INHALATION TREATMENT: CPT

## 2021-09-15 PROCEDURE — 94003 VENT MGMT INPAT SUBQ DAY: CPT

## 2021-09-15 PROCEDURE — 89220 SPUTUM SPECIMEN COLLECTION: CPT

## 2021-09-15 PROCEDURE — 84443 ASSAY THYROID STIM HORMONE: CPT

## 2021-09-15 PROCEDURE — 87449 NOS EACH ORGANISM AG IA: CPT

## 2021-09-15 PROCEDURE — 87040 BLOOD CULTURE FOR BACTERIA: CPT

## 2021-09-15 PROCEDURE — 84145 PROCALCITONIN (PCT): CPT

## 2021-09-15 PROCEDURE — 99255 IP/OBS CONSLTJ NEW/EST HI 80: CPT | Performed by: INTERNAL MEDICINE

## 2021-09-15 PROCEDURE — 94761 N-INVAS EAR/PLS OXIMETRY MLT: CPT

## 2021-09-15 PROCEDURE — 93005 ELECTROCARDIOGRAM TRACING: CPT | Performed by: INTERNAL MEDICINE

## 2021-09-15 RX ORDER — MINERAL OIL AND WHITE PETROLATUM 150; 830 MG/G; MG/G
OINTMENT OPHTHALMIC EVERY 4 HOURS
Status: DISCONTINUED | OUTPATIENT
Start: 2021-09-15 | End: 2021-09-18

## 2021-09-15 RX ORDER — FENTANYL CITRATE 50 UG/ML
50 INJECTION, SOLUTION INTRAMUSCULAR; INTRAVENOUS
Status: DISCONTINUED | OUTPATIENT
Start: 2021-09-15 | End: 2021-09-18

## 2021-09-15 RX ORDER — FENTANYL CITRATE 50 UG/ML
100 INJECTION, SOLUTION INTRAMUSCULAR; INTRAVENOUS PRN
Status: DISCONTINUED | OUTPATIENT
Start: 2021-09-15 | End: 2021-09-15

## 2021-09-15 RX ORDER — KETAMINE HYDROCHLORIDE 50 MG/ML
100 INJECTION, SOLUTION, CONCENTRATE INTRAMUSCULAR; INTRAVENOUS ONCE
Status: COMPLETED | OUTPATIENT
Start: 2021-09-15 | End: 2021-09-14

## 2021-09-15 RX ORDER — PANTOPRAZOLE SODIUM 40 MG/10ML
40 INJECTION, POWDER, LYOPHILIZED, FOR SOLUTION INTRAVENOUS DAILY
Status: DISCONTINUED | OUTPATIENT
Start: 2021-09-15 | End: 2021-09-23 | Stop reason: HOSPADM

## 2021-09-15 RX ORDER — DEXTROSE MONOHYDRATE 50 MG/ML
100 INJECTION, SOLUTION INTRAVENOUS PRN
Status: DISCONTINUED | OUTPATIENT
Start: 2021-09-15 | End: 2021-09-23 | Stop reason: HOSPADM

## 2021-09-15 RX ORDER — MIDAZOLAM HYDROCHLORIDE 1 MG/ML
2 INJECTION INTRAMUSCULAR; INTRAVENOUS
Status: DISCONTINUED | OUTPATIENT
Start: 2021-09-15 | End: 2021-09-18

## 2021-09-15 RX ORDER — ROCURONIUM BROMIDE 10 MG/ML
70 INJECTION, SOLUTION INTRAVENOUS ONCE
Status: COMPLETED | OUTPATIENT
Start: 2021-09-15 | End: 2021-09-14

## 2021-09-15 RX ORDER — FENTANYL CITRATE 50 UG/ML
100 INJECTION, SOLUTION INTRAMUSCULAR; INTRAVENOUS ONCE
Status: DISCONTINUED | OUTPATIENT
Start: 2021-09-15 | End: 2021-09-16

## 2021-09-15 RX ORDER — CARBOXYMETHYLCELLULOSE SODIUM 10 MG/ML
1 GEL OPHTHALMIC EVERY 4 HOURS
Status: DISCONTINUED | OUTPATIENT
Start: 2021-09-15 | End: 2021-09-18

## 2021-09-15 RX ORDER — NICOTINE POLACRILEX 4 MG
15 LOZENGE BUCCAL PRN
Status: DISCONTINUED | OUTPATIENT
Start: 2021-09-15 | End: 2021-09-23 | Stop reason: HOSPADM

## 2021-09-15 RX ORDER — LEVALBUTEROL 1.25 MG/.5ML
0.63 SOLUTION, CONCENTRATE RESPIRATORY (INHALATION)
Status: DISCONTINUED | OUTPATIENT
Start: 2021-09-15 | End: 2021-09-19

## 2021-09-15 RX ORDER — MINERAL OIL AND WHITE PETROLATUM 150; 830 MG/G; MG/G
OINTMENT OPHTHALMIC EVERY 4 HOURS
Status: DISCONTINUED | OUTPATIENT
Start: 2021-09-15 | End: 2021-09-15 | Stop reason: CLARIF

## 2021-09-15 RX ORDER — METOPROLOL TARTRATE 5 MG/5ML
5 INJECTION INTRAVENOUS EVERY 6 HOURS
Status: DISCONTINUED | OUTPATIENT
Start: 2021-09-15 | End: 2021-09-15

## 2021-09-15 RX ORDER — ALBUTEROL SULFATE 2.5 MG/3ML
2.5 SOLUTION RESPIRATORY (INHALATION)
Status: DISCONTINUED | OUTPATIENT
Start: 2021-09-15 | End: 2021-09-16

## 2021-09-15 RX ORDER — CHLORHEXIDINE GLUCONATE 0.12 MG/ML
15 RINSE ORAL 2 TIMES DAILY
Status: DISCONTINUED | OUTPATIENT
Start: 2021-09-15 | End: 2021-09-18

## 2021-09-15 RX ORDER — POLYVINYL ALCOHOL 14 MG/ML
1 SOLUTION/ DROPS OPHTHALMIC EVERY 4 HOURS
Status: DISCONTINUED | OUTPATIENT
Start: 2021-09-15 | End: 2021-09-15 | Stop reason: CLARIF

## 2021-09-15 RX ORDER — DEXTROSE MONOHYDRATE 25 G/50ML
12.5 INJECTION, SOLUTION INTRAVENOUS PRN
Status: DISCONTINUED | OUTPATIENT
Start: 2021-09-15 | End: 2021-09-23 | Stop reason: HOSPADM

## 2021-09-15 RX ADMIN — FENTANYL CITRATE 50 MCG: 50 INJECTION, SOLUTION INTRAMUSCULAR; INTRAVENOUS at 05:51

## 2021-09-15 RX ADMIN — DEXTROSE MONOHYDRATE 150 MG: 50 INJECTION, SOLUTION INTRAVENOUS at 17:36

## 2021-09-15 RX ADMIN — MIDAZOLAM HYDROCHLORIDE 2 MG: 1 INJECTION, SOLUTION INTRAMUSCULAR; INTRAVENOUS at 03:37

## 2021-09-15 RX ADMIN — FENTANYL CITRATE 100 MCG: 50 INJECTION, SOLUTION INTRAMUSCULAR; INTRAVENOUS at 03:41

## 2021-09-15 RX ADMIN — DEXTROSE MONOHYDRATE 500 MG: 50 INJECTION, SOLUTION INTRAVENOUS at 21:00

## 2021-09-15 RX ADMIN — PROPOFOL 10 MCG/KG/MIN: 10 INJECTION, EMULSION INTRAVENOUS at 00:04

## 2021-09-15 RX ADMIN — PANTOPRAZOLE SODIUM 40 MG: 40 INJECTION, POWDER, FOR SOLUTION INTRAVENOUS at 09:52

## 2021-09-15 RX ADMIN — CARBOXYMETHYLCELLULOSE SODIUM 1 DROP: 10 GEL OPHTHALMIC at 21:01

## 2021-09-15 RX ADMIN — IPRATROPIUM BROMIDE AND ALBUTEROL SULFATE 1 AMPULE: .5; 3 SOLUTION RESPIRATORY (INHALATION) at 01:40

## 2021-09-15 RX ADMIN — ALBUTEROL SULFATE 2.5 MG: 2.5 SOLUTION RESPIRATORY (INHALATION) at 19:06

## 2021-09-15 RX ADMIN — CARBOXYMETHYLCELLULOSE SODIUM 1 DROP: 10 GEL OPHTHALMIC at 11:58

## 2021-09-15 RX ADMIN — CARBOXYMETHYLCELLULOSE SODIUM 1 DROP: 10 GEL OPHTHALMIC at 15:57

## 2021-09-15 RX ADMIN — FENTANYL CITRATE 50 MCG: 50 INJECTION, SOLUTION INTRAMUSCULAR; INTRAVENOUS at 04:44

## 2021-09-15 RX ADMIN — AMIODARONE HYDROCHLORIDE 1 MG/MIN: 50 INJECTION, SOLUTION INTRAVENOUS at 17:54

## 2021-09-15 RX ADMIN — WHITE PETROLATUM 57.7 %-MINERAL OIL 31.9 % EYE OINTMENT: at 22:26

## 2021-09-15 RX ADMIN — IPRATROPIUM BROMIDE AND ALBUTEROL SULFATE 1 AMPULE: .5; 3 SOLUTION RESPIRATORY (INHALATION) at 19:06

## 2021-09-15 RX ADMIN — MUPIROCIN: 20 OINTMENT TOPICAL at 21:03

## 2021-09-15 RX ADMIN — DEXTROSE MONOHYDRATE 500 MG: 50 INJECTION, SOLUTION INTRAVENOUS at 01:31

## 2021-09-15 RX ADMIN — IPRATROPIUM BROMIDE AND ALBUTEROL SULFATE 1 AMPULE: .5; 3 SOLUTION RESPIRATORY (INHALATION) at 04:57

## 2021-09-15 RX ADMIN — ALBUTEROL SULFATE 2.5 MG: 2.5 SOLUTION RESPIRATORY (INHALATION) at 11:27

## 2021-09-15 RX ADMIN — ENOXAPARIN SODIUM 105 MG: 150 INJECTION SUBCUTANEOUS at 09:52

## 2021-09-15 RX ADMIN — DEXMEDETOMIDINE 1 MCG/KG/HR: 100 INJECTION, SOLUTION, CONCENTRATE INTRAVENOUS at 19:52

## 2021-09-15 RX ADMIN — ENOXAPARIN SODIUM 105 MG: 150 INJECTION SUBCUTANEOUS at 21:01

## 2021-09-15 RX ADMIN — FOLIC ACID: 5 INJECTION, SOLUTION INTRAMUSCULAR; INTRAVENOUS; SUBCUTANEOUS at 15:48

## 2021-09-15 RX ADMIN — ALBUTEROL SULFATE 2.5 MG: 2.5 SOLUTION RESPIRATORY (INHALATION) at 06:51

## 2021-09-15 RX ADMIN — SODIUM CHLORIDE, PRESERVATIVE FREE 10 ML: 5 INJECTION INTRAVENOUS at 10:04

## 2021-09-15 RX ADMIN — FOLIC ACID: 5 INJECTION, SOLUTION INTRAMUSCULAR; INTRAVENOUS; SUBCUTANEOUS at 00:29

## 2021-09-15 RX ADMIN — DEXMEDETOMIDINE 1.4 MCG/KG/HR: 100 INJECTION, SOLUTION, CONCENTRATE INTRAVENOUS at 04:49

## 2021-09-15 RX ADMIN — WHITE PETROLATUM 57.7 %-MINERAL OIL 31.9 % EYE OINTMENT: at 01:39

## 2021-09-15 RX ADMIN — DEXMEDETOMIDINE 1 MCG/KG/HR: 100 INJECTION, SOLUTION, CONCENTRATE INTRAVENOUS at 23:38

## 2021-09-15 RX ADMIN — MIDAZOLAM HYDROCHLORIDE 2 MG: 1 INJECTION, SOLUTION INTRAMUSCULAR; INTRAVENOUS at 05:51

## 2021-09-15 RX ADMIN — METOPROLOL TARTRATE 5 MG: 5 INJECTION INTRAVENOUS at 15:55

## 2021-09-15 RX ADMIN — IPRATROPIUM BROMIDE AND ALBUTEROL SULFATE 1 AMPULE: .5; 3 SOLUTION RESPIRATORY (INHALATION) at 14:06

## 2021-09-15 RX ADMIN — PROPOFOL 20 MCG/KG/MIN: 10 INJECTION, EMULSION INTRAVENOUS at 23:38

## 2021-09-15 RX ADMIN — IPRATROPIUM BROMIDE AND ALBUTEROL SULFATE 1 AMPULE: .5; 3 SOLUTION RESPIRATORY (INHALATION) at 15:25

## 2021-09-15 RX ADMIN — IPRATROPIUM BROMIDE AND ALBUTEROL SULFATE 1 AMPULE: .5; 3 SOLUTION RESPIRATORY (INHALATION) at 23:07

## 2021-09-15 RX ADMIN — SODIUM CHLORIDE: 9 INJECTION, SOLUTION INTRAVENOUS at 22:25

## 2021-09-15 RX ADMIN — PROPOFOL 20 MCG/KG/MIN: 10 INJECTION, EMULSION INTRAVENOUS at 15:36

## 2021-09-15 RX ADMIN — MUPIROCIN: 20 OINTMENT TOPICAL at 15:58

## 2021-09-15 RX ADMIN — METHYLPREDNISOLONE SODIUM SUCCINATE 40 MG: 40 INJECTION, POWDER, FOR SOLUTION INTRAMUSCULAR; INTRAVENOUS at 11:58

## 2021-09-15 RX ADMIN — MIDAZOLAM HYDROCHLORIDE 1 MG/HR: 5 INJECTION, SOLUTION INTRAMUSCULAR; INTRAVENOUS at 06:04

## 2021-09-15 RX ADMIN — IPRATROPIUM BROMIDE AND ALBUTEROL SULFATE 1 AMPULE: .5; 3 SOLUTION RESPIRATORY (INHALATION) at 08:40

## 2021-09-15 RX ADMIN — METOPROLOL TARTRATE 5 MG: 5 INJECTION INTRAVENOUS at 09:52

## 2021-09-15 RX ADMIN — INSULIN LISPRO 1 UNITS: 100 INJECTION, SOLUTION INTRAVENOUS; SUBCUTANEOUS at 21:06

## 2021-09-15 RX ADMIN — DEXMEDETOMIDINE 1.4 MCG/KG/HR: 100 INJECTION, SOLUTION, CONCENTRATE INTRAVENOUS at 01:32

## 2021-09-15 RX ADMIN — CEFTRIAXONE 2000 MG: 2 INJECTION, POWDER, FOR SOLUTION INTRAMUSCULAR; INTRAVENOUS at 22:30

## 2021-09-15 RX ADMIN — METHYLPREDNISOLONE SODIUM SUCCINATE 40 MG: 40 INJECTION, POWDER, FOR SOLUTION INTRAMUSCULAR; INTRAVENOUS at 22:25

## 2021-09-15 RX ADMIN — SODIUM CHLORIDE, PRESERVATIVE FREE 10 ML: 5 INJECTION INTRAVENOUS at 21:01

## 2021-09-15 RX ADMIN — DEXMEDETOMIDINE 1.39 MCG/KG/HR: 100 INJECTION, SOLUTION, CONCENTRATE INTRAVENOUS at 07:38

## 2021-09-15 RX ADMIN — SODIUM CHLORIDE: 9 INJECTION, SOLUTION INTRAVENOUS at 00:20

## 2021-09-15 RX ADMIN — CEFTRIAXONE 2000 MG: 2 INJECTION, POWDER, FOR SOLUTION INTRAMUSCULAR; INTRAVENOUS at 00:30

## 2021-09-15 RX ADMIN — CHLORHEXIDINE GLUCONATE 0.12% ORAL RINSE 15 ML: 1.2 LIQUID ORAL at 11:58

## 2021-09-15 RX ADMIN — DEXMEDETOMIDINE 1.3 MCG/KG/HR: 100 INJECTION, SOLUTION, CONCENTRATE INTRAVENOUS at 10:28

## 2021-09-15 RX ADMIN — CHLORHEXIDINE GLUCONATE 0.12% ORAL RINSE 15 ML: 1.2 LIQUID ORAL at 21:01

## 2021-09-15 RX ADMIN — LORAZEPAM 2 MG: 2 INJECTION INTRAMUSCULAR; INTRAVENOUS at 08:53

## 2021-09-15 RX ADMIN — ALBUTEROL SULFATE 2.5 MG: 2.5 SOLUTION RESPIRATORY (INHALATION) at 22:00

## 2021-09-15 RX ADMIN — SODIUM CHLORIDE: 9 INJECTION, SOLUTION INTRAVENOUS at 07:28

## 2021-09-15 RX ADMIN — MIDAZOLAM HYDROCHLORIDE 2 MG: 1 INJECTION, SOLUTION INTRAMUSCULAR; INTRAVENOUS at 04:40

## 2021-09-15 RX ADMIN — WHITE PETROLATUM 57.7 %-MINERAL OIL 31.9 % EYE OINTMENT: at 09:52

## 2021-09-15 RX ADMIN — MIDAZOLAM HYDROCHLORIDE 2 MG: 1 INJECTION, SOLUTION INTRAMUSCULAR; INTRAVENOUS at 01:21

## 2021-09-15 ASSESSMENT — PULMONARY FUNCTION TESTS
PIF_VALUE: 33
PIF_VALUE: 32
PIF_VALUE: 33
PIF_VALUE: 31
PIF_VALUE: 37
PIF_VALUE: 32
PIF_VALUE: 35
PIF_VALUE: 36
PIF_VALUE: 31
PIF_VALUE: 33
PIF_VALUE: 31
PIF_VALUE: 31
PIF_VALUE: 36
PIF_VALUE: 37

## 2021-09-15 ASSESSMENT — PAIN SCALES - GENERAL
PAINLEVEL_OUTOF10: 7
PAINLEVEL_OUTOF10: 5
PAINLEVEL_OUTOF10: 4

## 2021-09-15 NOTE — CARE COORDINATION
Case Management Assessment  Initial Evaluation      Patient Name: Esperanza Case  YOB: 1955  Diagnosis: Septicemia ([facility] [address] [A41.9]  Acute on chronic respiratory failure with hypoxia ([facility] [address] [J96.21]  Pneumonia due to COVID-19 virus [U07.1, J12.82]  Acute respiratory failure with hypoxia ([facility] [address] [J96.01]  Date / Time: 9/14/2021 12:11 PM    Admission status/Date: 09/14/2021 Inpatient  Chart Reviewed: Yes      Patient Christian Endo: No-pt on a Vent   Family Interviewed:  Yes - pt's sister in law Marlena garrett at [phone]    Hospitalization in the last 30 days:  No      Health Care Decision Maker :Pt on a Vent and not able to address    Met with: pt's sister in law Tayler Erickson conducted  (bedside/phone): phone call    Current PCP: None; information on the AVS for the [place] required for SNF : Y          3 night stay required - N    ADLS  Support Systems/Care Needs:    Transportation: family    Meal Preparation: self    Housing  Living Arrangements: pt lives at home with his brother Shelli Sweet, sister in law Marlena garrett, mother Héctor Young and grandkids  Steps: Ramp  Intent for return to present living arrangements: Yes per sister in law Marlena garrett  Identified Issues: [id number] [address] [facility] with [address] : No Agency:(Services)     Passport/Waiver : No  :                      Phone Number:    Passport/Waiver Services: n/a          Durable Medical Equiptment   DME Provider: n/a  Equipment:   Walker___Cane___RTS___ BSC___Shower Chair___Hospital Bed___W/C____Other________  02 at ____Liter(s)---wears(frequency)_______ HHN ___ CPAP___ BiPap___   N/A__x__      Home O2 Use :  No    If No for home O2---if presently on O2 during hospitalization:  Yes  if yes CM to follow for potential DC O2 need  Informed of need for care provider to bring portable home O2 tank on day of discharge for nursing to connect prior to leaving:   Not Indicated  Verbalized agreement/Understanding:   Not Indicated    Community Service Affiliation  Dialysis:  No    · Agency:  · Location:  · Dialysis Schedule:  · Phone:   · Fax: Other Community Services: n/a    DISCHARGE PLAN: Explained Case Management role/services. Chart review completed. Pt is in the ED awaiting a bed and is on a Vent. Pt not currently listed as a private encounter in [facility]. Called and spoke with pt's sister in law Marlena garrett for the assessment. Marlena garrett stated pt's brother Keerthi Hall was present as well as she had the phone on speaker phone and stated he is supposed to be an emergency contact. Explained writer has her as a contact and writer could speak with her. She stated agreement/understanding but that Keerthi Hall needs to be listed. Marlena garrett answered the questions that were asked. Marlena garrett stated pt is normally independent at home and plans on returning home. She stated that they want to speak with staff about adding Keerthi Hall to the emergency contacts and no one notified them that pt was placed on a vent last night. Explained writer would request RN to call her to address and she stated agreement. No further questions or concerns expressed. Spoke with Art RN who was updated on the above and stated he would notify pt's RN Yumiko Broussard as she was in pt's room. CM will need to address the order to Social Work with pt when medically appropriate. CM will follow. Please notify CM if needs or concerns arise.

## 2021-09-15 NOTE — CONSULTS
CARDIOLOGY CONSULTATION        Patient Name: Carmen Roe  Date of admission: 9/14/2021 12:11 PM  Admission Dx: Septicemia ([facility] [address] [A41.9]  Acute on chronic respiratory failure with hypoxia ([facility] [address] [J96.21]  Pneumonia due to COVID-19 virus [U07.1, J12.82]  Acute respiratory failure with hypoxia ([facility] [address] [J96.01]  Requesting Physician: Teofilo Steve MD  Primary Care physician: No primary care provider on file. Reason for Consultation/Chief Complaint: Atrial fibrillation     History of Present Illness:     Carmen Roe is a 61 y.o. patient with a prior medical history notable for tobacco use, who presented to the hospital 9/14/21 with complaints of 3 days of shortness of breath and productive cough, fevers, chills and GI symptoms. He was found to be in hypoxic respiratory failure with increased work of breathing. Patient seen with pulm/Dr. Gracie Banuelos who favors bacterial PNA. Combinations COVID-19/Flu testing negative as well as COVID-19 rapid and PCR, remains in droplet. Recommended to start BiPAP for WOB, steroids, anti-microbials. CT chest showed scattered GGO's, emphysema and RUL nodule. Initial EKG showed normal sinus rhythm, extreme R axis, RSR', poor R wave progression/possible septal infarct. Unfortunately patient's condition deteriorated requiring mechanical intubation yesterday late evening. History is gathered from chart review and in discussion with treating teams. Today's EKG shows atrial fibrillation  bpm for which cardiology is consulted. Care discussed with bedside RN. Patient went into AF abruptly this AM ~6:30 by tele review. He has broke once but back in AF, up to 130's rates. Not yet received therapy. Remains intubated/sedated on propofol and precedex. No pressors at this point. Past Medical History:  Tobacco use  Otherwise unable to obtain    Surgical History:  unable to obtain    Social History:   reports that he has been smoking.  He does not have any smokeless tobacco history on file. There is concern for EtOH abuse.      Family History:  unable to obtain    Home Medications:  Were reviewed and are listed in nursing record and/or below  Prior to Admission medications    Not on File        CURRENT Medications:  chlorhexidine (PERIDEX) 0.12 % solution 15 mL, BID  midazolam (VERSED) injection 2 mg, Q1H PRN  fentaNYL (SUBLIMAZE) injection 50 mcg, Q1H PRN  pantoprazole (PROTONIX) injection 40 mg, Daily  carboxymethylcellulose PF (THERATEARS) 1 % ophthalmic gel 1 drop, Q4H  lubrifresh P.M. (artificial tears) ophthalmic ointment, Q4H  fentaNYL (SUBLIMAZE) injection 100 mcg, Once  levalbuterol (XOPENEX) nebulizer solution 0.63 mg, Q2H PRN  albuterol (PROVENTIL) nebulizer solution 2.5 mg, every 2 hours  midazolam (VERSED) 1 mg/mL in D5W infusion, Continuous  enoxaparin (LOVENOX) injection 40 mg, Daily  polyethylene glycol (GLYCOLAX) packet 17 g, Daily PRN  acetaminophen (TYLENOL) tablet 650 mg, Q6H PRN   Or  acetaminophen (TYLENOL) suppository 650 mg, Q6H PRN  0.9 % sodium chloride infusion, Continuous  guaiFENesin-dextromethorphan (ROBITUSSIN DM) 100-10 MG/5ML syrup 5 mL, Q4H PRN  ipratropium-albuterol (DUONEB) nebulizer solution 1 ampule, Q4H  cefTRIAXone (ROCEPHIN) 2000 mg IVPB in D5W 50ml minibag, Q24H  albuterol (PROVENTIL) (2.5 MG/3ML) 0.083% nebulizer solution,   azithromycin (ZITHROMAX) 500 mg in D5W 250ml addavial, Q24H  methylPREDNISolone sodium (SOLU-MEDROL) injection 40 mg, Q12H  dexmedetomidine (PRECEDEX) 400 mcg in sodium chloride 0.9 % 100 mL infusion, Continuous  prochlorperazine (COMPAZINE) injection 10 mg, Q6H PRN  nicotine (NICODERM CQ) 14 MG/24HR 1 patch, Daily  LORazepam (ATIVAN) injection 1 mg, Once  sodium chloride flush 0.9 % injection 5-40 mL, 2 times per day  sodium chloride flush 0.9 % injection 5-40 mL, PRN  0.9 % sodium chloride infusion, PRN  thiamine tablet 100 mg, Daily  therapeutic multivitamin-minerals 1 tablet, Daily  folic acid (FOLVITE) tablet 1 for: TRIG  No results found for: HDL  No results found for: LDLCHOLESTEROL, LDLCALC  No results found for: LABVLDL, VLDL  No results found for: [facility]     Cardiac Data:     EKG: personally reviewed, my interpretation as above. Telemetry personally reviewed: paroxysmal AF starting at 6:30, rates up to 130's. No prior cardiac testing    Additional studies:   CT chest personally reviewed. Impression and Plan:      1. Atrial fibrillation with RVR   2. Acute hypoxic resp failure  3. Multifocal Pneumonia, undifferentiated, COVID-19 testing negative to date  4. COPD  5. Sepsis  6. Leukocytosis, resovled  7. Hyponatremia  8. Mildly elevated liver enzymes  9. Lung nodule by CT      Patient Active Problem List   Diagnosis    Acute respiratory failure with hypoxia (HCC)    Multifocal pneumonia    Suspected COVID-19 virus infection    Sepsis ([facility] [address]    Acute on chronic respiratory failure with hypoxia ([facility] [address]    Pneumonia due to COVID-19 virus       PLAN:  1. Unknown medical history in large part. Hypertensive at presentation in extremis, aortic plaque noted by CT. [id number] therefore 1-2. Would favor anti-coagulation in this setting. Will start therapeutic lovenox. 2. Start metoprolol 5 mg IV q6h. If drops pressures or ineffective to keep HR < 120 bpm will re-consider therapies. 3. Deferred echo to be planned upon recovery. Do not feel will  at this time. 4. Agree with further workup including strep/legionella antigens. Continues on anti-microbial therapy     We will follow     I will address the patient's cardiac risk factors and adjusted pharmacologic treatment as needed. In addition, I have reinforced the need for patient directed risk factor modification. All questions and concerns were addressed to the patient/family. Alternatives to my treatment were discussed. Thank you for allowing us to participate in the care of [employer].  Please call me with any questions [phone].     Brown Holden MD, [address] [facility]  Cardiovascular Disease  [facility]  [phone] [facility]  [phone] [facility]  9/15/2021 8:15 AM

## 2021-09-15 NOTE — PROCEDURES
Pt was sedated with 100 mg ketamine and 70 mg rocuronium. Pt was intubated in a single attempt using a Glide scope with a size 3 MAC in place. The tip of a size 8 ETT was inserted between the cords and then advanced off of the stylette into the air way to 23 cm from the lip. Cuff was inflated and secured. Good CO2 color change and bilateral breath sounds auscultated. Vent settings provided to respiratory staff. OG was passed to 65 cm from the lip. CXR ordered and pending to verify tube placement.

## 2021-09-15 NOTE — PROGRESS NOTES
ABG reviewed. I recommend txf to ICU and initiation of BiPAP. Clinical RN and hospitalist are aware.

## 2021-09-15 NOTE — PROGRESS NOTES
Placed pt on bipap. Pt was unable to tolerate the bipap. Nurse started sedation. Pt was able to tolerate the BIPAP for approximately 40 minutes. Patient had increased work of breathing, tripoding, gave multiple breathing treatments.

## 2021-09-15 NOTE — FLOWSHEET NOTE
09/14/21 2200   Vital Signs   Temp 97.3 °F (36.3 °C)   Temp Source Oral   Pulse 101   Resp 28   BP (!) 190/77   BP Location Left upper arm   Level of Consciousness Alert (0)   MEWS Score 3   Oxygen Therapy   SpO2 98 %   O2 Device Nasal cannula   O2 Flow Rate (L/min) 4 L/min   Height and Weight   Height 5' 10\" (1.778 m)   Weight 240 lb (108.9 kg)   Weight Method Stated   BSA (Calculated - sq m) 2.32 sq meters   BMI (Calculated) 34.5   Pt arrived from ER per w/c. Pt awake/alert and oriented times 4. Pt taken out of droplet plus precautions after speaking to clinical Ledora Louder. Pt had rapid COVID negative and PCR COVID negative. Telemetry monitor and cont pulse ox placed on pt. Call light within reach. New BIPAP order noted, called clinical and she is supposed to call writer back.   Eliseo Ordonez RN

## 2021-09-15 NOTE — ED NOTES
Bed: 12  Expected date:   Expected time:   Means of arrival:   Comments:  Ishmael Jaquez RN  09/14/21 1721

## 2021-09-15 NOTE — ED NOTES
Consult call to Cardiology placed at [address] [facility].      Irina Avila  09/15/21 7973

## 2021-09-15 NOTE — PROGRESS NOTES
Pulmonary & Critical Care Medicine ICU Progress Note    CC: Shortness of breath    Events of Last 24 hours: Transferred to ICU for BiPAP, failed BiPAP due to AMS/agitation and required emergent intubation. I discussed this patient's care with his brother and sister-in-law. Invasive Lines: Peripheral    MV: 2021  Vent Mode: AC/VC Rate Set: 26 bmp/Vt Ordered: 480 mL/ /FiO2 : 30 %  Recent Labs     09/15/21  0119 09/15/21  0455   PHART 7.151* 7.315*   KVK5VIX 95.2* 46.9*   PO2ART 35.7* 82.7       IV:   midazolam 1 mg/hr (09/15/21 2155)    dextrose      amiodarone 1 mg/min (09/15/21 2155)    Followed by   [facility] amiodarone      sodium chloride 75 mL/hr at 09/15/21 0728    dexmedetomidine (PRECEDEX) IV infusion 1 mcg/kg/hr (09/15/21 2155)    sodium chloride      propofol 20 mcg/kg/min (09/15/21 2155)       Vitals:  Blood pressure 127/85, pulse 118, temperature 98 °F (36.7 °C), temperature source Axillary, resp. rate 26, height 5' 10\" (1.778 m), weight 249 lb (112.9 kg), SpO2 97 %. on vent  Temp  Av.9 °F (36.6 °C)  Min: 97.5 °F (36.4 °C)  Max: 98.1 °F (36.7 °C)    Intake/Output Summary (Last 24 hours) at 9/15/2021 2200  Last data filed at 9/15/2021 2155  Gross per 24 hour   Intake 1821.64 ml   Output 650 ml   Net 1171.64 ml     EXAM:  General: intubated, ill appearing    ENT: Pharynx with ETT. Resp: No crackles. + wheezing. Very prolonged expiratory phase  CV: S1, S2. Trace edema  GI: NT, ND, +BS  Skin: Warm and dry. Neuro: PERRL. Sedated, not following commands.  Patellar reflexes are symmetric     Scheduled Meds:   chlorhexidine  15 mL Mouth/Throat BID    pantoprazole  40 mg IntraVENous Daily    carboxymethylcellulose PF  1 drop Both Eyes Q4H    artificial tears   Both Eyes Q4H    fentanNYL  100 mcg IntraVENous Once    albuterol  2.5 mg Nebulization every 2 hours    enoxaparin  1 mg/kg SubCUTAneous BID    folic acid, thiamine, multi-vitamin with vitamin K infusion   IntraVENous Daily    mupirocin   Nasal BID    insulin lispro  0-6 Units SubCUTAneous TID WC    insulin lispro  0-3 Units SubCUTAneous Nightly    ipratropium-albuterol  1 ampule Inhalation Q4H    cefTRIAXone (ROCEPHIN) IV  2,000 mg IntraVENous Q24H    azithromycin  500 mg IntraVENous Q24H    methylPREDNISolone  40 mg IntraVENous Q12H    nicotine  1 patch TransDERmal Daily    sodium chloride flush  5-40 mL IntraVENous 2 times per day     PRN Meds:  midazolam, fentanNYL, levalbuterol, glucose, dextrose, glucagon (rDNA), dextrose, polyethylene glycol, acetaminophen **OR** acetaminophen, guaiFENesin-dextromethorphan, prochlorperazine, sodium chloride flush, sodium chloride, LORazepam **OR** LORazepam **OR** LORazepam **OR** LORazepam **OR** LORazepam **OR** LORazepam **OR** LORazepam **OR** LORazepam    Results:  CBC:   Recent Labs     09/14/21  1230 09/15/21  0637   WBC 17.7* 8.4   HGB 16.5 14.0   HCT 48.1 41.7   MCV 96.7 98.3    148     BMP:   Recent Labs     09/14/21  1230 09/15/21  0637   * 130*   K 4.9 4.3   CL 87* 96*   CO2 27 21   BUN 13 17   CREATININE 0.8 0.8     LIVER PROFILE:   Recent Labs     09/14/21  1230 09/15/21  0637   AST 79* 89*   ALT 51* 55*   BILITOT 1.1* 0.4   ALKPHOS 93 82       Cultures:  9/14/2021 SARS-CoV-2 NAAT and PCR negative  9/14/2021 blood NGTD  9/15/2021 urine antigens are negative  9/15/2021 tracheal aspirate sent    Films:  9/14/21 CTPA   Pulmonary Arteries: Pulmonary arteries are adequately opacified for   evaluation.  No evidence of intraluminal filling defect to suggest pulmonary   embolism.  Main pulmonary artery is normal in caliber.       Mediastinum: The central airways are clear.  Right hilar lymphadenopathy   measuring 2.3 x 1.1 cm (2, 135).  The heart and pericardium demonstrate no   acute abnormality.  Atherosclerotic disease of the thoracic aorta.       Lungs/pleura: Irregular right upper lobe nodule measures 2.4 x 1.5 cm (2,   51).   Right lower lobe consolidation and Tentative plan is for an in person meeting tomorrow to discuss the plan of care. Total critical care time caring for this patient with life threatening, unstable organ failure, including direct patient contact, management of life support systems, review of data including imaging and labs, discussions with other team members and physicians is 45 minutes so far today, excluding procedures.

## 2021-09-15 NOTE — ED NOTES
Dr. Herb Ibrahim, pulmonologist/intensivist, at bedside. Decreased vent RR to 26. Instructed to wean down Precidex and turn up propofol. Concern for COVID 19 is low. Not an extubation candidate as of today. He will change PO meds ordered to IV.        Tyree Mock RN  09/15/21 4153

## 2021-09-15 NOTE — PROGRESS NOTES
09/15/21 1906   Vent Information   $Ventilation $Subsequent Day   Vent Type 840   Vent Mode AC/VC   Vt Ordered 480 mL   Rate Set 26 bmp   Peak Flow 70 L/min   Pressure Support 0 cmH20   FiO2  30 %   SpO2 95 %   SpO2/FiO2 ratio 316.67   Sensitivity 3   PEEP/CPAP 5   I Time/ I Time % 0 s   Humidification Source Heated wire   Humidification Temp 37   Humidification Temp Measured 37   Circuit Condensation Drained   Vent Patient Data   High Peep/I Pressure 0   Peak Inspiratory Pressure 31 cmH2O   Mean Airway Pressure 13 cmH20   Rate Measured 26 br/min   Vt Exhaled 487 mL   Minute Volume 13.3 Liters   I:E Ratio 1:2.10   Plateau Pressure 21 TXT95   Static Compliance 30 mL/cmH2O   Dynamic Compliance 19 mL/cmH2O   Cough/Sputum   Sputum How Obtained Suctioned;Endotracheal   Cough Non-productive   Spontaneous Breathing Trial (SBT) RT Doc   Pulse 107   Breath Sounds   Right Upper Lobe Diminished   Right Middle Lobe Diminished   Right Lower Lobe Diminished   Left Upper Lobe Diminished   Left Lower Lobe Diminished   Additional Respiratory  Assessments   Resp 26   Position Semi-Worley's   Oral Care Completed? Yes   Oral Care Mouth suctioned   Subglottic Suction Done?  Yes   Alarm Settings   High Pressure Alarm 45 cmH2O   Low Minute Volume Alarm 4 L/min   Apnea (secs) 20 secs   High Respiratory Rate 40 br/min   Low Exhaled Vt  250 mL   Non-Surgical Airway 09/14/21 Endo Tracheal Tube   Placement Date/Time: 09/14/21 1002   Placed By: In ED  Inserted by: Tylor Reece MD   Insertion attempts: 1  Airway Device: Endo Tracheal Tube  Size: 8  Cuff Volume : 10 ml  Placement Verified By[de-identified] Auscultation   Secured at 24 cm   Measured From [address] [employer] tube obando   Site Condition Dry

## 2021-09-15 NOTE — ED NOTES
Spoke with cardiologist regarding persistent tachycardia. Will order Amiodarone and d/c metoprolol.      Nelda Quinteros RN  09/15/21 5324

## 2021-09-15 NOTE — PROGRESS NOTES
ABG drawn at 455am. Results for Manjeet Wiley (MRN [id number]) as of 9/15/2021 05:24   Ref. Range 9/15/2021 04:55   pH, Arterial Latest Ref Range: 7.350 - 7.450  7.315 (L)   pCO2, Arterial Latest Ref Range: 35.0 - 45.0 mmHg 46.9 (H)   pO2, Arterial Latest Ref Range: 75.0 - 108.0 mmHg 82.7   HCO3, Arterial Latest Ref Range: 21.0 - 29.0 mmol/L 23.4   TCO2 (calc), Art Latest Ref Range: Not Established mmol/L 24.8   Base Excess, Arterial Latest Ref Range: -3.0 - 3.0 mmol/L -3.1 (L)   O2 Sat, Arterial Latest Ref Range: >92 % 95.2   Methemoglobin, Arterial Latest Ref Range: <1.5 % 0.3   Carboxyhgb, Arterial Latest Ref Range: 0.0 - 1.5 % 0.6     Left vent settings the same.

## 2021-09-15 NOTE — PROGRESS NOTES
Progress Note    Admit Date:  9/14/2021    Subjective:  Mr. Anderson Johnston is on the vent     Objective:   Patient Vitals for the past 4 hrs:   BP Temp Temp src Pulse Resp SpO2   09/15/21 1231 (!) 144/86 97.5 °F (36.4 °C) Axillary 114 26 96 %   09/15/21 1216 (!) 131/109 -- -- 130 24 97 %   09/15/21 1201 (!) 140/84 -- -- 130 27 96 %   09/15/21 1200 (!) 140/84 -- -- 122 -- --   09/15/21 1147 120/78 -- -- 123 24 96 %   09/15/21 1129 -- -- -- 110 23 99 %       Intake/Output Summary (Last 24 hours) at 9/15/2021 1404  Last data filed at 9/15/2021 1016  Gross per 24 hour   Intake 3981.33 ml   Output 525 ml   Net 3456.33 ml     Physical Exam:    GEN: Sedated, on the vent  HEENT: NC/AT,EOMI, semi dry MM, no erythema/exudates or visible masses. CVS: Normal S1,S2. Tachy RRR. Without M/G/R.   LUNG: Bilat wheezes and course central BS. Tachypnea, diminished/tight bilat. ABD: Soft, ND/NT, BS+ x4. Without G/R.  EXT: 2+ pulses, no c/c/e. Brisk cap refill. PSY: Thought process intact, affect appropriate. LORRAINE:  Sedated. SKIN: No rash or lesions on visible skin.     Scheduled Meds:   chlorhexidine  15 mL Mouth/Throat BID    pantoprazole  40 mg IntraVENous Daily    carboxymethylcellulose PF  1 drop Both Eyes Q4H    artificial tears   Both Eyes Q4H    fentanNYL  100 mcg IntraVENous Once    albuterol  2.5 mg Nebulization every 2 hours    metoprolol  5 mg IntraVENous Q6H    enoxaparin  1 mg/kg SubCUTAneous BID    folic acid, thiamine, multi-vitamin with vitamin K infusion   IntraVENous Daily    mupirocin   Nasal BID    ipratropium-albuterol  1 ampule Inhalation Q4H    cefTRIAXone (ROCEPHIN) IV  2,000 mg IntraVENous Q24H    azithromycin  500 mg IntraVENous Q24H    methylPREDNISolone  40 mg IntraVENous Q12H    nicotine  1 patch TransDERmal Daily    sodium chloride flush  5-40 mL IntraVENous 2 times per day       Continuous Infusions:   midazolam 1 mg/hr (09/15/21 0723)    sodium chloride 75 mL/hr at 09/15/21 0728    dexmedetomidine (PRECEDEX) IV infusion 1.1 mcg/kg/hr (09/15/21 1234)    sodium chloride      propofol 15 mcg/kg/min (09/15/21 1246)       PRN Meds:  midazolam, fentanNYL, levalbuterol, polyethylene glycol, acetaminophen **OR** acetaminophen, guaiFENesin-dextromethorphan, prochlorperazine, sodium chloride flush, sodium chloride, LORazepam **OR** LORazepam **OR** LORazepam **OR** LORazepam **OR** LORazepam **OR** LORazepam **OR** LORazepam **OR** LORazepam      Data:  CBC:   Recent Labs     09/14/21  1230 09/15/21  0637   WBC 17.7* 8.4   HGB 16.5 14.0   HCT 48.1 41.7   MCV 96.7 98.3    148     BMP:   Recent Labs     09/14/21  1230 09/15/21  0637   * 130*   K 4.9 4.3   CL 87* 96*   CO2 27 21   BUN 13 17   CREATININE 0.8 0.8     LIVER PROFILE:   Recent Labs     09/14/21  1230 09/15/21  0637   AST 79* 89*   ALT 51* 55*   BILITOT 1.1* 0.4   ALKPHOS 93 82     CULTURES    Urine legionella: negative    Strep pneumo: negative    Blood cx x2: pending    Resp cx: pending    SARS-COV-2 - Rapid PCR: Not detected     Rapid Influenza A/B: negative      SARS-COV-2 - Rapid: Not detected      RADIOLOGY    XR CHEST PORTABLE   Final Result   Improving aeration of the lungs. XR CHEST PORTABLE   Final Result   Lines and tubes as described. Multifocal airspace disease appears similar to prior exam.         CT CHEST PULMONARY EMBOLISM W CONTRAST   Final Result   No evidence of pulmonary embolism. COVID-19 pneumonia as described above. Irregular right upper lobe mass as measured above. RECOMMENDATIONS:   Chest CT in 3 months is recommended for follow-up evaluation of right upper   lobe irregular lesion. XR CHEST PORTABLE   Final Result   1. Nodular/ill-defined opacity in the right upper lung zone. Recommend   dedicated noncontrast CT for further evaluation and to exclude a nodule.    2. Patchy right-sided pulmonary opacities are noted, which could represent   multifocal pneumonia, atelectasis or atypical edema. 3. Small bilateral pleural fluid. Assessment/Plan:    Acute respiratory failure with hypoxia and hypercapnia  - likely related to #3  - Admitted to ICU, tele  - supplemental O2 to maintain SPO2 ? 92%, cont pulse ox  - Wean as tolerated. Started on BiPAP per my orders. ABG's/CXRs ordered  - pt intubated on 9/14  - Intensivist c/s. Sepsis  - 2/2 PNA  -  ABX as below  - No need for pressors at this time  -  F/u blood, resp cx    Multifocal PNA  - (CAP - likely GP organism) w/ concern for underlying chronic lung path (COPD, asthma, etc); PCT 1.02  -  IV solumedrol, IV azithro/ceftriaxone  - PRN/AURORA intensive NEB therapy. Check respiratory culture; urine legionella and strep pnuemo - neg  - Pulm consult. Hold home regimen for now. - repeat PCT 1.48 today    Atrial Fibrillation with RVR  - unknown hx; TSH normal  - full dose lovenox  - on Metoprolol 5 mg IV q6h  - cardiology following - defer echo until patient is more medically stable    Hyponatremia  - 127 on arrival  - IVF and repeat - 130 today    Hyperglycemia  - likely 2/2 steroids  - low dose SSI, check Hgb A1c  - PRN hypoglycemia protocol in place    Hypocalcemia  - 6.8, corrects to 7.5    Transaminitis  - slightly worsened from admission  - no priors available for comparison  - monitor LFTs    Lactic acidosis  - 2.1, 2.0  - IVF, trend lactic     Leukocytosis - Resolved  - 17.7  - 2/2 above, monitor. Irregular RUL mass  - 2.4 cm in greatest dimension. Hilar LAD  - Will need OP follow up/imaging in 3 mos. No PCP per Epic. Prolonged QTc  - 474 ms, avoid QT prolonging agents as able  - tele    Tobacco Abuse  - counseled cessation  - 14 mg nicotine patch. DVT Prophylaxis: Lovenox  Diet: Diet NPO Exceptions are: Ice Chips, Sips of Water with Meds  Code Status: Full Code    NATALIA Balbuena.

## 2021-09-15 NOTE — ED NOTES
7852 Pt began to become more confused and being unable to be redirected, pt attempting to remove BiPAP mask and getting out of bed. Esperanza REYNOSO notified of pt status,. Esperanza REYNOSO to room for intubation .        Ibeth Springer RN  09/15/21 9119

## 2021-09-15 NOTE — H&P
Hospital Medicine History & Physical      PCP: none    Date of Service: Pt seen/examined on 9/14/21 and admitted on 9/14/21 to Inpatient    Chief Complaint   Patient presents with    Shortness of Breath     for 3 days     History Of Present Illness: The patient is a 61 y.o. male with PMH below, presents with SOB/BROWN, resp distress, productive cough, hypoxia, fever, chills, diarrhea, nausea. Patient presented to the ER in respiratory distress and satting 88% on room air. He is not normally on O2. He has been requiring 3 to 4 L of O2 to maintain sats. Blood gas was obtained and he is now being started on BiPAP secondary to respiratory acidosis and hypercapnia. He denies history of lung problems. He is currently a smoker and has been long-term. Strongly suspect some underlying lung pathology. He has had associated productive cough, nausea, diarrhea, fever and chills. Symptoms started 3 days ago. No additional history available at this time. Past Medical History:    History reviewed. No pertinent past medical history. Past Surgical History:    History reviewed. No pertinent surgical history. Medications Prior to Admission:    Prior to Admission medications    Not on File       Allergies:  Patient has no known allergies. Social History:    TOBACCO:   reports that he has been smoking. He does not have any smokeless tobacco history on file. ETOH:   has no history on file for alcohol use. Family History:  Reviewed in detail and negative for DM, Early CAD, Cancer (except as below). Positive as follows:    History reviewed. No pertinent family history.     REVIEW OF SYSTEMS:   Pertinent positives/negatives as follows: SOB/BROWN, resp distress, productive cough, hypoxia, fever, chills, diarrhea, nausea, and as discussed in HPI, otherwise a complete ROS performed and all other systems are negative  PHYSICAL EXAM PERFORMED:    BP (!) 190/77   Pulse 101   Temp 97.3 °F (36.3 °C) (Oral)   Resp 28   Ht 5' 10\" (1.778 m)   Wt 240 lb (108.9 kg)   SpO2 98%   BMI 34.44 kg/m²     GEN:  A&Ox3, significant increased WOB w/ acc mm use and speaking in 3-5 word sentences. HEENT:  NC/AT,EOMI, semi dry MM, no erythema/exudates or visible masses. CVS:  Normal S1,S2. Tachy RRR. Without M/G/R.   LUNG:   Bilat wheezes and course central BS. Tachypnea, diminished/tight bilat. ABD:  Soft, ND/NT, BS+ x4. Without G/R.  EXT: 2+ pulses, no c/c/e. Brisk cap refill. PSY:  Thought process intact, affect appropriate. LORRAINE:  CN III-XII intact, moves all 4 spontaneously, sensory grossly intact. SKIN: No rash or lesions on visible skin. Chart review shows recent radiographs:  XR CHEST PORTABLE    Result Date: 9/14/2021  EXAMINATION: ONE XRAY VIEW OF THE CHEST 9/14/2021 1:06 pm COMPARISON: None. HISTORY: ORDERING SYSTEM PROVIDED HISTORY: SOB TECHNOLOGIST PROVIDED HISTORY: Reason for exam:->SOB Reason for Exam: SOB Acuity: Acute Type of Exam: Initial FINDINGS: Patchy opacities noted in the right mid to lower lung zone. Hazy opacities are seen at the right lung base and right mid lung zone. There is a 13 mm nodular opacity in the right upper lung zone, which becomes ill-defined on 1 of the views. Cardiac silhouette is enlarged. No discrete pneumothorax. Trace blunting of the costophrenic angles bilaterally. 1. Nodular/ill-defined opacity in the right upper lung zone. Recommend dedicated noncontrast CT for further evaluation and to exclude a nodule. 2. Patchy right-sided pulmonary opacities are noted, which could represent multifocal pneumonia, atelectasis or atypical edema. 3. Small bilateral pleural fluid. CT CHEST PULMONARY EMBOLISM W CONTRAST    Result Date: 9/14/2021  EXAMINATION: CTA OF THE CHEST 9/14/2021 1:50 pm TECHNIQUE: CTA of the chest was performed after the administration of intravenous contrast.  Multiplanar reformatted images are provided for review. MIP images are provided for review.  Dose modulation, iterative reconstruction, and/or weight based adjustment of the mA/kV was utilized to reduce the radiation dose to as low as reasonably achievable. COMPARISON: Chest x-ray dated 09/14/2021 HISTORY: ORDERING SYSTEM PROVIDED HISTORY: SOB TECHNOLOGIST PROVIDED HISTORY: Reason for exam:->SOB Decision Support Exception - unselect if not a suspected or confirmed emergency medical condition->Emergency Medical Condition (MA) Reason for Exam: sob ,   - covid Acuity: Acute Type of Exam: Initial FINDINGS: Pulmonary Arteries: Pulmonary arteries are adequately opacified for evaluation. No evidence of intraluminal filling defect to suggest pulmonary embolism. Main pulmonary artery is normal in caliber. Mediastinum: The central airways are clear. Right hilar lymphadenopathy measuring 2.3 x 1.1 cm (2, 135). The heart and pericardium demonstrate no acute abnormality. Atherosclerotic disease of the thoracic aorta. Lungs/pleura: Irregular right upper lobe nodule measures 2.4 x 1.5 cm (2, 51). Right lower lobe consolidation and scattered bilateral ground-glass opacities, consistent with COVID-19 pneumonia. Panlobular emphysema. No pleural effusion or pneumothorax. Upper Abdomen: Right adrenal gland nodule measuring 1.6 cm. Left adrenal gland hyperplasia. Rest of visualized upper abdomen is unremarkable. Soft Tissues/Bones: Degenerate disease of the thoracic spine. No focal osseous lesion. Visualized soft tissues are unremarkable. No evidence of pulmonary embolism. COVID-19 pneumonia as described above. Irregular right upper lobe mass as measured above. RECOMMENDATIONS: Chest CT in 3 months is recommended for follow-up evaluation of right upper lobe irregular lesion.        EKG:    EKG 12 Lead [[id number]]    Collected: 09/14/21 1440    Updated: 09/14/21 1507     Ventricular Rate 98 BPM    Atrial Rate 98 BPM    P-R Interval 144 ms    QRS Duration 100 ms    Q-T Interval 372 ms    QTc Calculation (Bazett) 474 ms P Axis 67 degrees    R Axis 264 degrees    T Axis 82 degrees    Diagnosis Normal sinus rhythmRight superior axis deviationPulmonary disease patternPossible Right ventricular hypertrophySeptal infarct , age undeterminedAbnormal ECGConfirmed by Kip Pantoja MD, Steve Diehl ([id number]) on 9/14/2021 3:07:33 PM       CBC:  Recent Labs     09/14/21  1230   WBC 17.7*   HGB 16.5   HCT 48.1           RENAL  Recent Labs     09/14/21  1230   *   K 4.9   CL 87*   CO2 27   BUN 13   CREATININE 0.8   GLUCOSE 132*     LFT'S:  Recent Labs     09/14/21  1230   AST 79*   ALT 51*   BILITOT 1.1*   ALKPHOS 93     CARDIAC ENZYMES:   Recent Labs     09/14/21  1230   TROPONINI <0.01     Lab Results   Component Value Date    PROBNP 660 (H) 09/14/2021       LACTIC ACID:  Recent Labs     09/14/21  1230 09/14/21  1756   LACTSEPSIS 2.1* 2.0*     ABG:   Recent Labs     09/14/21  2132   PHART 7.183*   NVB1URC 75.1*   PO2ART 99.6   HOO1FES 27.6   A9FONAWU 95.7   BPB1GFR 29.9   BEART -3.1*     PHYSICIAN CERTIFICATION  I certify that Eligio Rawls is expected to be hospitalized for 2 midnights based on the following assessment and plan:    ASSESSMENT/PLAN:  1. Acute respiratory failure with hypoxia and hypercapnia, likely related to #3, supplemental O2 to maintain SPO2 ? 92%, continuous pulse ox. Wean as tolerated. Start BiPAP per my orders. ABG's/CXRs ordered. Transfer to ICU. Intensivist c/s.   2. Sepsis, 2/2 PNA. ABX as below. No need for pressors at this time. F/u cx.   3. Multifocal PNA (CAP) w/ concern for underlying chronic lung path (COPD, asthma, etc). IV solumedrol, IV azithro/ceftriaxone, PRN/AURORA intensive NEB therapy. Check respiratory culture. PCT 1.02.  Pulm consult. Hold home regimen for now. 4. Hyponatremia, 127, IVF and repeat in am.    5. Lactic acidosis. 2.1, 2.0.  1 repeat ordered for this evening. IVF. 6. Leukocytosis, 17.7, monitor. 7. Irregular RUL mass, 2.4 cm in greatest dimension. Hilar LAD.   Will need OP follow up/imaging in 3 mos. No PCP per Epic. 8. COVID concern, rapid neg and PCR COVID/flu neg. Per pulm pt to remain in droplet +. 9. Prolonged QTc, 474 ms, avoid QT prolonging agents as able. 10. Tobacco Abuse, counseled cessation, 14 mg nicotine patch. Addendum [id number]:  Multiple visits to bedside. Pt's ABG came back shortly after arrival to . I transferred pt to ICU for emergent BIPAP but no beds available so pt was taken to ER for level of care. Pt not tolerating BIPAP despite Precedex gtt and multiple dose of Ativan. Decision made to emergently intubate pt. CCT documented below denotes all time spent thus far excluding time spent intubating pt. DVT Prophylaxis: Lx  Diet: NPO  Code Status: Full Code   PT/OT Eval Status: Will order if needed and as patient condition allows  Dispo - Admit to inpatient ICU    Total critical care time caring for this patient with life threatening, unstable organ failure, including direct patient contact, management of life support systems, review of data including imaging and labs, discussions with other team members and physicians is 80 minutes so far today, excluding procedures. Multiple visits to bedside. Diamantina Osgood, MD    This chart was generated using the TUTORize dictation system. I created this record but it may contain dictation errors given the limitations of this technology.

## 2021-09-15 NOTE — ED NOTES
Pt wallet and cell phone in personal belongings bag with sweatshirt and shoes.         20 dollars in [facility]  09/15/21 3859

## 2021-09-15 NOTE — PROGRESS NOTES
Pt's family members were upset that no physician had contacted them since patient left 2W. Contacted hospitalist Dr. Tami Cheng and she states she had not evaluated the patient yet, so she could not speak with family yet. Informed ICU RN to please have physicians speak to family in the AM regarding status and plan of care. Was able to get in touch with Dr. Guero Bethea and stated he would call and update family. Followed up with patient's brother, Gregory Harkins and Phuong Díaz at [phone] and stated she was called by Dr. Guero Bethea and he answered all current questions/concerns that family had at this time.

## 2021-09-15 NOTE — ED NOTES
[address]  with cardiology at bedside discussed with PERLA.      Adenike Montenegro  09/15/21 0910

## 2021-09-15 NOTE — PROGRESS NOTES
Pt intubated with 8 ETT without complication. ETT secured at 24 lip with Mike ETT obando. Placement confirmed with good color change on ETCO2 detector and bilateral breath sounds. Pt placed on ventilator with settings of A/C 24/480/50%/+8. Spo2 100%. Will continue to monitor. ABG will be drawn in 1 hour.

## 2021-09-16 LAB
A/G RATIO: 1.2 (ref 1.1–2.2)
ALBUMIN SERPL-MCNC: 3.2 G/DL (ref 3.4–5)
ALP BLD-CCNC: 90 U/L (ref 40–129)
ALT SERPL-CCNC: 76 U/L (ref 10–40)
ANION GAP SERPL CALCULATED.3IONS-SCNC: 12 MMOL/L (ref 3–16)
AST SERPL-CCNC: 106 U/L (ref 15–37)
BASE EXCESS ARTERIAL: -1.1 MMOL/L (ref -3–3)
BASOPHILS ABSOLUTE: 0 K/UL (ref 0–0.2)
BASOPHILS RELATIVE PERCENT: 0.3 %
BILIRUB SERPL-MCNC: 0.3 MG/DL (ref 0–1)
BUN BLDV-MCNC: 22 MG/DL (ref 7–20)
CALCIUM SERPL-MCNC: 7.7 MG/DL (ref 8.3–10.6)
CARBOXYHEMOGLOBIN ARTERIAL: 0.5 % (ref 0–1.5)
CHLORIDE BLD-SCNC: 99 MMOL/L (ref 99–110)
CO2: 19 MMOL/L (ref 21–32)
CREAT SERPL-MCNC: 0.8 MG/DL (ref 0.8–1.3)
EOSINOPHILS ABSOLUTE: 0 K/UL (ref 0–0.6)
EOSINOPHILS RELATIVE PERCENT: 0 %
ESTIMATED AVERAGE GLUCOSE: 108.3 MG/DL
GFR AFRICAN AMERICAN: >60
GFR NON-AFRICAN AMERICAN: >60
GLOBULIN: 2.6 G/DL
GLUCOSE BLD-MCNC: 150 MG/DL (ref 70–99)
GLUCOSE BLD-MCNC: 172 MG/DL (ref 70–99)
GLUCOSE BLD-MCNC: 174 MG/DL (ref 70–99)
GLUCOSE BLD-MCNC: 175 MG/DL (ref 70–99)
GLUCOSE BLD-MCNC: 191 MG/DL (ref 70–99)
GLUCOSE BLD-MCNC: 212 MG/DL (ref 70–99)
HBA1C MFR BLD: 5.4 %
HCO3 ARTERIAL: 25.5 MMOL/L (ref 21–29)
HCT VFR BLD CALC: 45.6 % (ref 40.5–52.5)
HEMATOLOGY PATH CONSULT: NORMAL
HEMOGLOBIN, ART, EXTENDED: 15.9 G/DL (ref 13.5–17.5)
HEMOGLOBIN: 15.1 G/DL (ref 13.5–17.5)
LYMPHOCYTES ABSOLUTE: 0.3 K/UL (ref 1–5.1)
LYMPHOCYTES RELATIVE PERCENT: 3.6 %
MCH RBC QN AUTO: 32.7 PG (ref 26–34)
MCHC RBC AUTO-ENTMCNC: 33.1 G/DL (ref 31–36)
MCV RBC AUTO: 98.8 FL (ref 80–100)
METHEMOGLOBIN ARTERIAL: 0.3 %
MONOCYTES ABSOLUTE: 0.9 K/UL (ref 0–1.3)
MONOCYTES RELATIVE PERCENT: 10.2 %
NEUTROPHILS ABSOLUTE: 7.6 K/UL (ref 1.7–7.7)
NEUTROPHILS RELATIVE PERCENT: 85.9 %
O2 SAT, ARTERIAL: 95.6 %
O2 THERAPY: ABNORMAL
PCO2 ARTERIAL: 49.8 MMHG (ref 35–45)
PDW BLD-RTO: 13.9 % (ref 12.4–15.4)
PERFORMED ON: ABNORMAL
PH ARTERIAL: 7.33 (ref 7.35–7.45)
PLATELET # BLD: 151 K/UL (ref 135–450)
PMV BLD AUTO: 9.3 FL (ref 5–10.5)
PO2 ARTERIAL: 84.2 MMHG (ref 75–108)
POTASSIUM REFLEX MAGNESIUM: 5.1 MMOL/L (ref 3.5–5.1)
PROCALCITONIN: 1.09 NG/ML (ref 0–0.15)
RBC # BLD: 4.62 M/UL (ref 4.2–5.9)
SODIUM BLD-SCNC: 130 MMOL/L (ref 136–145)
TCO2 ARTERIAL: 27.1 MMOL/L
TOTAL PROTEIN: 5.8 G/DL (ref 6.4–8.2)
WBC # BLD: 8.8 K/UL (ref 4–11)

## 2021-09-16 PROCEDURE — 82803 BLOOD GASES ANY COMBINATION: CPT

## 2021-09-16 PROCEDURE — 2500000003 HC RX 250 WO HCPCS: Performed by: INTERNAL MEDICINE

## 2021-09-16 PROCEDURE — 6360000002 HC RX W HCPCS: Performed by: INTERNAL MEDICINE

## 2021-09-16 PROCEDURE — 80053 COMPREHEN METABOLIC PANEL: CPT

## 2021-09-16 PROCEDURE — 6370000000 HC RX 637 (ALT 250 FOR IP): Performed by: INTERNAL MEDICINE

## 2021-09-16 PROCEDURE — 99233 SBSQ HOSP IP/OBS HIGH 50: CPT | Performed by: INTERNAL MEDICINE

## 2021-09-16 PROCEDURE — 2700000000 HC OXYGEN THERAPY PER DAY

## 2021-09-16 PROCEDURE — 2580000003 HC RX 258: Performed by: INTERNAL MEDICINE

## 2021-09-16 PROCEDURE — 2000000000 HC ICU R&B

## 2021-09-16 PROCEDURE — 85025 COMPLETE CBC W/AUTO DIFF WBC: CPT

## 2021-09-16 PROCEDURE — 36415 COLL VENOUS BLD VENIPUNCTURE: CPT

## 2021-09-16 PROCEDURE — 94640 AIRWAY INHALATION TREATMENT: CPT

## 2021-09-16 PROCEDURE — 99291 CRITICAL CARE FIRST HOUR: CPT | Performed by: INTERNAL MEDICINE

## 2021-09-16 PROCEDURE — C9113 INJ PANTOPRAZOLE SODIUM, VIA: HCPCS | Performed by: INTERNAL MEDICINE

## 2021-09-16 PROCEDURE — 84145 PROCALCITONIN (PCT): CPT

## 2021-09-16 PROCEDURE — 94003 VENT MGMT INPAT SUBQ DAY: CPT

## 2021-09-16 PROCEDURE — 94761 N-INVAS EAR/PLS OXIMETRY MLT: CPT

## 2021-09-16 PROCEDURE — 2580000003 HC RX 258: Performed by: PHYSICIAN ASSISTANT

## 2021-09-16 RX ORDER — FUROSEMIDE 10 MG/ML
20 INJECTION INTRAMUSCULAR; INTRAVENOUS ONCE
Status: COMPLETED | OUTPATIENT
Start: 2021-09-16 | End: 2021-09-16

## 2021-09-16 RX ORDER — ALBUTEROL SULFATE 2.5 MG/3ML
2.5 SOLUTION RESPIRATORY (INHALATION) EVERY 4 HOURS PRN
Status: DISCONTINUED | OUTPATIENT
Start: 2021-09-16 | End: 2021-09-19

## 2021-09-16 RX ORDER — DEXMEDETOMIDINE HYDROCHLORIDE 4 UG/ML
.2-1.4 INJECTION, SOLUTION INTRAVENOUS CONTINUOUS
Status: DISCONTINUED | OUTPATIENT
Start: 2021-09-16 | End: 2021-09-20

## 2021-09-16 RX ADMIN — IPRATROPIUM BROMIDE AND ALBUTEROL SULFATE 1 AMPULE: .5; 3 SOLUTION RESPIRATORY (INHALATION) at 23:23

## 2021-09-16 RX ADMIN — AMIODARONE HYDROCHLORIDE 0.5 MG/MIN: 50 INJECTION, SOLUTION INTRAVENOUS at 20:34

## 2021-09-16 RX ADMIN — PANTOPRAZOLE SODIUM 40 MG: 40 INJECTION, POWDER, FOR SOLUTION INTRAVENOUS at 09:00

## 2021-09-16 RX ADMIN — ENOXAPARIN SODIUM 105 MG: 150 INJECTION SUBCUTANEOUS at 20:23

## 2021-09-16 RX ADMIN — CARBOXYMETHYLCELLULOSE SODIUM 1 DROP: 10 GEL OPHTHALMIC at 00:54

## 2021-09-16 RX ADMIN — Medication 1 MCG/KG/HR: at 20:51

## 2021-09-16 RX ADMIN — IPRATROPIUM BROMIDE AND ALBUTEROL SULFATE 1 AMPULE: .5; 3 SOLUTION RESPIRATORY (INHALATION) at 19:14

## 2021-09-16 RX ADMIN — MUPIROCIN: 20 OINTMENT TOPICAL at 08:58

## 2021-09-16 RX ADMIN — CARBOXYMETHYLCELLULOSE SODIUM 1 DROP: 10 GEL OPHTHALMIC at 20:22

## 2021-09-16 RX ADMIN — Medication 1 MCG/KG/HR: at 12:18

## 2021-09-16 RX ADMIN — SODIUM CHLORIDE: 9 INJECTION, SOLUTION INTRAVENOUS at 13:59

## 2021-09-16 RX ADMIN — IPRATROPIUM BROMIDE AND ALBUTEROL SULFATE 1 AMPULE: .5; 3 SOLUTION RESPIRATORY (INHALATION) at 11:50

## 2021-09-16 RX ADMIN — SODIUM CHLORIDE, PRESERVATIVE FREE 10 ML: 5 INJECTION INTRAVENOUS at 08:58

## 2021-09-16 RX ADMIN — FUROSEMIDE 20 MG: 10 INJECTION, SOLUTION INTRAMUSCULAR; INTRAVENOUS at 11:16

## 2021-09-16 RX ADMIN — METHYLPREDNISOLONE SODIUM SUCCINATE 40 MG: 40 INJECTION, POWDER, FOR SOLUTION INTRAMUSCULAR; INTRAVENOUS at 11:16

## 2021-09-16 RX ADMIN — IPRATROPIUM BROMIDE AND ALBUTEROL SULFATE 1 AMPULE: .5; 3 SOLUTION RESPIRATORY (INHALATION) at 02:39

## 2021-09-16 RX ADMIN — Medication 1 MCG/KG/HR: at 04:36

## 2021-09-16 RX ADMIN — PROPOFOL 20 MCG/KG/MIN: 10 INJECTION, EMULSION INTRAVENOUS at 13:37

## 2021-09-16 RX ADMIN — DEXTROSE MONOHYDRATE 500 MG: 50 INJECTION, SOLUTION INTRAVENOUS at 20:32

## 2021-09-16 RX ADMIN — CEFTRIAXONE 2000 MG: 2 INJECTION, POWDER, FOR SOLUTION INTRAMUSCULAR; INTRAVENOUS at 22:34

## 2021-09-16 RX ADMIN — WHITE PETROLATUM 57.7 %-MINERAL OIL 31.9 % EYE OINTMENT: at 11:17

## 2021-09-16 RX ADMIN — WHITE PETROLATUM 57.7 %-MINERAL OIL 31.9 % EYE OINTMENT: at 19:00

## 2021-09-16 RX ADMIN — IPRATROPIUM BROMIDE AND ALBUTEROL SULFATE 1 AMPULE: .5; 3 SOLUTION RESPIRATORY (INHALATION) at 15:47

## 2021-09-16 RX ADMIN — WHITE PETROLATUM 57.7 %-MINERAL OIL 31.9 % EYE OINTMENT: at 06:59

## 2021-09-16 RX ADMIN — Medication 1 MCG/KG/HR: at 07:57

## 2021-09-16 RX ADMIN — CHLORHEXIDINE GLUCONATE 0.12% ORAL RINSE 15 ML: 1.2 LIQUID ORAL at 20:23

## 2021-09-16 RX ADMIN — CHLORHEXIDINE GLUCONATE 0.12% ORAL RINSE 15 ML: 1.2 LIQUID ORAL at 08:58

## 2021-09-16 RX ADMIN — METHYLPREDNISOLONE SODIUM SUCCINATE 40 MG: 40 INJECTION, POWDER, FOR SOLUTION INTRAMUSCULAR; INTRAVENOUS at 22:32

## 2021-09-16 RX ADMIN — FOLIC ACID: 5 INJECTION, SOLUTION INTRAMUSCULAR; INTRAVENOUS; SUBCUTANEOUS at 09:00

## 2021-09-16 RX ADMIN — MUPIROCIN: 20 OINTMENT TOPICAL at 20:25

## 2021-09-16 RX ADMIN — ENOXAPARIN SODIUM 105 MG: 150 INJECTION SUBCUTANEOUS at 08:57

## 2021-09-16 RX ADMIN — ALBUTEROL SULFATE 2.5 MG: 2.5 SOLUTION RESPIRATORY (INHALATION) at 01:00

## 2021-09-16 RX ADMIN — CARBOXYMETHYLCELLULOSE SODIUM 1 DROP: 10 GEL OPHTHALMIC at 17:21

## 2021-09-16 RX ADMIN — Medication 1 MCG/KG/HR: at 17:20

## 2021-09-16 RX ADMIN — AMIODARONE HYDROCHLORIDE 0.5 MG/MIN: 50 INJECTION, SOLUTION INTRAVENOUS at 02:21

## 2021-09-16 RX ADMIN — CARBOXYMETHYLCELLULOSE SODIUM 1 DROP: 10 GEL OPHTHALMIC at 08:58

## 2021-09-16 RX ADMIN — WHITE PETROLATUM 57.7 %-MINERAL OIL 31.9 % EYE OINTMENT: at 14:30

## 2021-09-16 RX ADMIN — CARBOXYMETHYLCELLULOSE SODIUM 1 DROP: 10 GEL OPHTHALMIC at 05:20

## 2021-09-16 RX ADMIN — CARBOXYMETHYLCELLULOSE SODIUM 1 DROP: 10 GEL OPHTHALMIC at 13:00

## 2021-09-16 RX ADMIN — ALBUTEROL SULFATE 2.5 MG: 2.5 SOLUTION RESPIRATORY (INHALATION) at 02:39

## 2021-09-16 RX ADMIN — MIDAZOLAM HYDROCHLORIDE 2 MG: 1 INJECTION, SOLUTION INTRAMUSCULAR; INTRAVENOUS at 11:56

## 2021-09-16 RX ADMIN — PROPOFOL 20 MCG/KG/MIN: 10 INJECTION, EMULSION INTRAVENOUS at 07:03

## 2021-09-16 RX ADMIN — WHITE PETROLATUM 57.7 %-MINERAL OIL 31.9 % EYE OINTMENT: at 22:30

## 2021-09-16 RX ADMIN — WHITE PETROLATUM 57.7 %-MINERAL OIL 31.9 % EYE OINTMENT: at 00:53

## 2021-09-16 RX ADMIN — IPRATROPIUM BROMIDE AND ALBUTEROL SULFATE 1 AMPULE: .5; 3 SOLUTION RESPIRATORY (INHALATION) at 07:59

## 2021-09-16 RX ADMIN — SODIUM CHLORIDE, PRESERVATIVE FREE 10 ML: 5 INJECTION INTRAVENOUS at 20:23

## 2021-09-16 ASSESSMENT — PULMONARY FUNCTION TESTS
PIF_VALUE: 30
PIF_VALUE: 36
PIF_VALUE: 27
PIF_VALUE: 30
PIF_VALUE: 39
PIF_VALUE: 32
PIF_VALUE: 34
PIF_VALUE: 33
PIF_VALUE: 31
PIF_VALUE: 29
PIF_VALUE: 26
PIF_VALUE: 30
PIF_VALUE: 34
PIF_VALUE: 34
PIF_VALUE: 29
PIF_VALUE: 30
PIF_VALUE: 26

## 2021-09-16 ASSESSMENT — PAIN SCALES - GENERAL
PAINLEVEL_OUTOF10: 0

## 2021-09-16 NOTE — PROGRESS NOTES
First shift assessment complete. See flowsheet for full assessment. Patient remains sedated. No issues to report at this time. Safety measures remain in place. Will continue to monitor.

## 2021-09-16 NOTE — PROGRESS NOTES
Comprehensive Nutrition Assessment    Type and Reason for Visit:  Initial, Consult (consult for TF ordering and management)    Nutrition Recommendations/Plan:   1. Continue NPO status until patient is medically cleared to receive nutrition therapy while intubated. 2. TF recommendations - ADULT TUBE FEEDING; Orogastric; Peptide-Based formula - Vital AF 1.2 with a goal rate of 50 ml/hr x 20 hours. Start with 20 ml/hr and increase by 15 ml every 4 hours, as tolerated by patient, until goal rate can be achieved and maintained. Water flushes, 30 ml every 4 hours or per MD guidance. 3. Monitor vent status, sedation type/amount (propofol at 20 mcg x 24 hours which = 361 kcals from lipids), and plan of care + TG checks while patient is receiving propofol for sedation. 4. Monitor nutrition-related labs, bowel function, and weight trends. Nutrition Assessment:  patient is nutritionally compromised AEB N/D and SOB upon admission r/t COVID-19 virus and he is at risk for further compromise d/t intubation status, increased nutrition needs r/t COVID-19 virus, and altered nutrition-related labs; will continue NPO status and provide EN recommendations for patient    Malnutrition Assessment:  Malnutrition Status: At risk for malnutrition    Context:  Acute Illness     Findings of the 6 clinical characteristics of malnutrition:  Energy Intake:  Unable to assess  Weight Loss:  Unable to assess     Body Fat Loss:  Unable to assess (COVID-19 +)     Muscle Mass Loss:  Unable to assess (COVID-19 +)    Fluid Accumulation:  No significant fluid accumulation     Strength:  Not Performed    Estimated Daily Nutrient Needs:  Energy (kcal):  1254 - 1596 kcals based on 11-14 kcals/kg/CBW; Weight Used for Energy Requirements:  Current     Protein (g):  150 - 165 g protein based on 2.0-2.2 g/kg/IBW;  Weight Used for Protein Requirements:  Ideal        Fluid (ml/day):  1254 - 1596 ml; Method Used for Fluid Requirements:  1 ml/kcal Nutrition Related Findings:  patient is intubated and sedated on propofol at 20 mcg x 24 hours at this time; patient was intubated on 9/14/21; OG to LWIS at this time; Na and Ca are low; blood glucose and BUN and AST/ALT are elevated; NS at 75 ml/hr and low-dose SSI ordered at this time      Wounds:  None       Current Nutrition Therapies:    Diet NPO Exceptions are: Ice Chips, Sips of Water with Meds    Anthropometric Measures:  · Height: 5' 10\" (177.8 cm)  · Current Body Weight: 251 lb 12.3 oz (114.2 kg) (obtained on 9/16/21)   · Admission Body Weight: 249 lb (112.9 kg) (obtained on 9/15/21; actual weight)    · Usual Body Weight: 249 lb (112.9 kg) (obtained on 9/15/21; actual weight)     · Ideal Body Weight: 166 lbs; % Ideal Body Weight 151.7 %   · BMI: 36.1   · BMI Categories: Obese Class 2 (BMI 35.0 -39.9)       Nutrition Diagnosis:   · Inadequate oral intake related to inadequate protein-energy intake, impaired respiratory function, increase demand for energy/nutrients, inadequate enteral nutrition infusion as evidenced by NPO or clear liquid status due to medical condition, intubation, lab values      Nutrition Interventions:   Food and/or Nutrient Delivery:  Continue NPO, Start Tube Feeding  Nutrition Education/Counseling:  No recommendation at this time   Coordination of Nutrition Care:  Continue to monitor while inpatient, Interdisciplinary Rounds    Goals:  patient will remain in NPO status until medically cleared to receive nutrition therapy while intubated       Nutrition Monitoring and Evaluation:   Behavioral-Environmental Outcomes:  None Identified   Food/Nutrient Intake Outcomes:  Diet Advancement/Tolerance, Enteral Nutrition Intake/Tolerance, IVF Intake  Physical Signs/Symptoms Outcomes:  Biochemical Data, GI Status, Fluid Status or Edema, Hemodynamic Status, Skin, Weight     Discharge Planning:     Too soon to determine     Electronically signed by Brayan Kate, KAIT, LD on 9/16/21 at 12:00 PM EDT    Contact: [phone]

## 2021-09-16 NOTE — PROGRESS NOTES
SBT attempted at this time. Patient did not tolerate. 09/16/21 1151   Spontaneous Breathing Trial (SBT) RT Doc   Patient fail SBT? Yes   Reasons for SBT failure?  RSBI > 105;Respiratory rate < 8 or > 35

## 2021-09-16 NOTE — PROGRESS NOTES
Pulmonary & Critical Care Medicine ICU Progress Note    CC: Shortness of breath    Events of Last 24 hours:   I discussed this patient's care with his brother and sister-in-law yesterday evening as requested    Invasive Lines: Peripheral    MV: 2021  Vent Mode: AC/VC Rate Set: 26 bmp/Vt Ordered: 480 mL/ /FiO2 : 30 %  Recent Labs     09/15/21  0119 09/15/21  0455   PHART 7.151* 7.315*   BTJ9ODO 95.2* 46.9*   PO2ART 35.7* 82.7       IV:   dexmedetomidine HCl in NaCl 1 mcg/kg/hr (21)    midazolam 1 mg/hr (21)    dextrose      amiodarone 0.5 mg/min (21)    sodium chloride 75 mL/hr at 21    sodium chloride      propofol 20 mcg/kg/min (21)       Vitals:  Blood pressure (!) 113/96, pulse 107, temperature 97.8 °F (36.6 °C), temperature source Axillary, resp. rate 23, height 5' 10\" (1.778 m), weight 251 lb 12.3 oz (114.2 kg), SpO2 97 %. on vent  Temp  Av.9 °F (36.6 °C)  Min: 97.5 °F (36.4 °C)  Max: 98.1 °F (36.7 °C)    Intake/Output Summary (Last 24 hours) at 2021 0709  Last data filed at 2021 0557  Gross per 24 hour   Intake 3792.81 ml   Output 1072 ml   Net 2720.81 ml     EXAM:  General: intubated, ill appearing    ENT: Pharynx with ETT. Resp: No crackles. less wheezing. Very prolonged expiratory phase  CV: S1, S2. Trace edema  GI: NT, ND, +BS  Skin: Warm and dry. Neuro: PERRL. Sedated, not following commands.  Patellar reflexes are symmetric     Scheduled Meds:   chlorhexidine  15 mL Mouth/Throat BID    pantoprazole  40 mg IntraVENous Daily    carboxymethylcellulose PF  1 drop Both Eyes Q4H    artificial tears   Both Eyes Q4H    fentanNYL  100 mcg IntraVENous Once    enoxaparin  1 mg/kg SubCUTAneous BID    folic acid, thiamine, multi-vitamin with vitamin K infusion   IntraVENous Daily    mupirocin   Nasal BID    insulin lispro  0-6 Units SubCUTAneous TID WC    insulin lispro  0-3 Units SubCUTAneous Nightly    ipratropium-albuterol  1 ampule Inhalation Q4H    cefTRIAXone (ROCEPHIN) IV  2,000 mg IntraVENous Q24H    azithromycin  500 mg IntraVENous Q24H    methylPREDNISolone  40 mg IntraVENous Q12H    nicotine  1 patch TransDERmal Daily    sodium chloride flush  5-40 mL IntraVENous 2 times per day     PRN Meds:  albuterol, midazolam, fentanNYL, levalbuterol, glucose, dextrose, glucagon (rDNA), dextrose, polyethylene glycol, acetaminophen **OR** acetaminophen, guaiFENesin-dextromethorphan, prochlorperazine, sodium chloride flush, sodium chloride, LORazepam **OR** LORazepam **OR** LORazepam **OR** LORazepam **OR** LORazepam **OR** LORazepam **OR** LORazepam **OR** LORazepam    Results:  CBC:   Recent Labs     09/14/21  1230 09/15/21  0637 09/16/21  0418   WBC 17.7* 8.4 8.8   HGB 16.5 14.0 15.1   HCT 48.1 41.7 45.6   MCV 96.7 98.3 98.8    148 151     BMP:   Recent Labs     09/14/21  1230 09/15/21  0637 09/16/21  0418   * 130* 130*   K 4.9 4.3 5.1   CL 87* 96* 99   CO2 27 21 19*   BUN 13 17 22*   CREATININE 0.8 0.8 0.8     LIVER PROFILE:   Recent Labs     09/14/21  1230 09/15/21  0637 09/16/21  0418   AST 79* 89* 106*   ALT 51* 55* 76*   BILITOT 1.1* 0.4 0.3   ALKPHOS 93 82 90       Cultures:  9/14/2021 SARS-CoV-2 NAAT and PCR negative  9/14/2021 blood NGTD  9/15/2021 urine antigens are negative  9/15/2021 tracheal aspirate sent    Films:  9/14/21 CTPA   Pulmonary Arteries: Pulmonary arteries are adequately opacified for   evaluation.  No evidence of intraluminal filling defect to suggest pulmonary   embolism.  Main pulmonary artery is normal in caliber.       Mediastinum: The central airways are clear.  Right hilar lymphadenopathy   measuring 2.3 x 1.1 cm (2, 135).  The heart and pericardium demonstrate no   acute abnormality.  Atherosclerotic disease of the thoracic aorta.       Lungs/pleura: Irregular right upper lobe nodule measures 2.4 x 1.5 cm (2,   51).   Right lower lobe consolidation and scattered bilateral ground-glass   opacities, consistent with COVID-19 pneumonia.  Panlobular emphysema.  No   pleural effusion or pneumothorax.       Upper Abdomen: Right adrenal gland nodule measuring 1.6 cm.  Left adrenal   gland hyperplasia.  Rest of visualized upper abdomen is unremarkable.       Soft Tissues/Bones: Degenerate disease of the thoracic spine.  No focal   osseous lesion.  Visualized soft tissues are unremarkable.           Impression   No evidence of pulmonary embolism.       COVID-19 pneumonia as described above.       Irregular right upper lobe mass as measured above. ASSESSMENT:  · Acute hypoxic and hypercapnic respiratory failure - failed BiPAP  · Community acquired pneumonia - I favor bacterial bronchopneumonia   · Atrial fibrillation with RVR   · 2.4 cm right upper lobe pulmonary nodule, appears concerning for malignancy. He has a remote history of a pulmonary nodule, he thinks sometime in the 90s, he thinks it was on the left side. · Right hilar lymphadenopathy, concerning for malignancy  · Right adrenal nodule 1.6 cm  · Mild transaminitis, h/o alcohol use/abuse   · Hyperglycemia     PLAN:  - COVID-19 isolation, droplet plus  -more Covid is felt to be a less likely diagnosis, with multiple systemic symptoms, patient should remain in isolation until alternative cause confirmed  Mechanical ventilation as per my orders. The ventilator was adjusted by me at the bedside for unstable, life threatening respiratory failure. IV Propofol and Precedex for sedation, target RASS -2, with daily spontaneous awakening trial.  Fentanyl PRN pain, gtt as needed.   CTX/Azithromycin D#3  IVF and one time lasix   IV solumedrol twice daily  Tobacco cessation is recommended   · Will ask radiology to review the adrenal nodule  · Probable outpatient CT PET for suspicious Pulmonary Nodule/hilar lymphadenopathy   · Prophylaxis: On full dose Lovenox due to A. fib, Protonix and Bactroban  · NOK brother (sister-in-law)

## 2021-09-16 NOTE — PROGRESS NOTES
Shift assessment completed, see flow sheet. RASS -2. Intubated and sedated on AC # 8 ETT, at 27 LL. 26 / 480 / 30 %/ +5. SpO2 96%. Respirations are easy, even, and unlabored. Bilateral lung sounds diminished. VSS  ST on the monitor. OG in place at 72. Clenton Reas PIV, WNL with Propofol, Precedex, Versed and NS infusing. See MAR. All lines and monitoring devices in place. Avina is patent and secured. Bilateral soft wrist restraints in place for patient safety. Bed in lowest position with wheels locked. No needs expressed at this time.  Will continue to monitor

## 2021-09-16 NOTE — PLAN OF CARE
Nutrition Problem #1: Inadequate oral intake  Intervention: Food and/or Nutrient Delivery: Continue NPO, Start Tube Feeding  Nutritional Goals: patient will remain in NPO status until medically cleared to receive nutrition therapy while intubated

## 2021-09-16 NOTE — PROGRESS NOTES
Dr. Cathe Aschoff rounded on patient with multidisciplinary team.    -Stop Versed  -Reduce Propofol for SBT, keep Precedex on  -Change insulin to Q4 medium Sliding scale  -Lasix 20 mg IVP once

## 2021-09-16 NOTE — PROGRESS NOTES
Spoke with Romana Astorga from contact list, to relay the message from Dr. Chastity Frausto, that they may come and be present for rounds, between 0900 and 0930 this morning, or have a family meeting with Dr. Chastity Frausto at 1600. Romana Astorga states that they will discuss the options and call back with their preferred option.

## 2021-09-16 NOTE — PROGRESS NOTES
09/15/21 2307   Vent Information   Vent Type 840   Vent Mode AC/VC   Vt Ordered 480 mL   Rate Set 26 bmp   Peak Flow 70 L/min   Pressure Support 0 cmH20   FiO2  30 %   SpO2 95 %   SpO2/FiO2 ratio 316.67   Sensitivity 3   PEEP/CPAP 5   I Time/ I Time % 0 s   Humidification Source Heated wire   Humidification Temp Measured 36.9   Circuit Condensation Drained   Vent Patient Data   High Peep/I Pressure 0   Peak Inspiratory Pressure 35 cmH2O   Mean Airway Pressure 13 cmH20   Rate Measured 26 br/min   Vt Exhaled 521 mL   Minute Volume 13.4 Liters   I:E Ratio 1:2.10   Cough/Sputum   Sputum How Obtained Endotracheal   Cough Non-productive   Sputum Amount None   Spontaneous Breathing Trial (SBT) RT Doc   Pulse 117   Breath Sounds   Right Upper Lobe Diminished   Right Middle Lobe Diminished   Right Lower Lobe Diminished   Left Upper Lobe Diminished   Left Lower Lobe Diminished   Additional Respiratory  Assessments   Resp 26   Alarm Settings   High Pressure Alarm 45 cmH2O   Low Minute Volume Alarm 4 L/min   Apnea (secs) 20 secs   High Respiratory Rate 40 br/min   Low Exhaled Vt  250 mL   Patient Observation   Observations 8ett 27 at lip

## 2021-09-16 NOTE — PROGRESS NOTES
09/16/21 1914   Vent Information   $Ventilation $Subsequent Day   Vent Type 840   Vent Mode AC/VC   Vt Ordered 480 mL   Rate Set 26 bmp   Peak Flow 70 L/min   Pressure Support 0 cmH20   FiO2  30 %   SpO2 98 %   SpO2/FiO2 ratio 326.67   Sensitivity 3   PEEP/CPAP 5   I Time/ I Time % 0 s   Humidification Source Heated wire   Humidification Temp 37   Humidification Temp Measured 36   Circuit Condensation Drained   Vent Patient Data   High Peep/I Pressure 0   Peak Inspiratory Pressure 34 cmH2O   Mean Airway Pressure 13 cmH20   Rate Measured 27 br/min   Vt Exhaled 584 mL   Minute Volume 14 Liters   I:E Ratio 1:2.10   Plateau Pressure 26 FKW27   Static Compliance 28 mL/cmH2O   Dynamic Compliance 20 mL/cmH2O   Cough/Sputum   Sputum How Obtained Suctioned;Endotracheal   Cough Non-productive   Spontaneous Breathing Trial (SBT) RT Doc   Pulse 106   Breath Sounds   Right Upper Lobe Diminished   Right Middle Lobe Diminished   Right Lower Lobe Diminished   Left Upper Lobe Diminished   Left Lower Lobe Diminished   Additional Respiratory  Assessments   Resp 24   Position Semi-Worley's   Oral Care Completed? Yes   Oral Care Mouth suctioned   Subglottic Suction Done?  Yes   Alarm Settings   High Pressure Alarm 45 cmH2O   Low Minute Volume Alarm 4 L/min   Apnea (secs) 20 secs   High Respiratory Rate 40 br/min   Low Exhaled Vt  250 mL   Non-Surgical Airway 09/14/21 Endo Tracheal Tube   Placement Date/Time: 09/14/21 8914   Placed By: In ED  Inserted by: Donte Hahn MD   Insertion attempts: 1  Airway Device: Endo Tracheal Tube  Size: 8  Cuff Volume : 10 ml  Placement Verified By[de-identified] Auscultation   Secured at 26 cm   Measured From Lips   Secured Location Left   Secured By Commercial tube obando   Site Condition Dry

## 2021-09-16 NOTE — PROGRESS NOTES
09/16/21 0239   Vent Information   Vent Type 840   Vent Mode AC/VC   Vt Ordered 480 mL   Rate Set 26 bmp   Peak Flow 70 L/min   Pressure Support 0 cmH20   FiO2  30 %   SpO2 91 %   SpO2/FiO2 ratio 303.33   Sensitivity 3   PEEP/CPAP 5   I Time/ I Time % 0 s   Vent Patient Data   High Peep/I Pressure 0   Peak Inspiratory Pressure 34 cmH2O   Mean Airway Pressure 13 cmH20   Rate Measured 26 br/min   Vt Exhaled 579 mL   Minute Volume 13.6 Liters   I:E Ratio 1:2.10   Plateau Pressure 22 URO24   Cough/Sputum   Sputum How Obtained Suctioned;Endotracheal   Cough Non-productive   Spontaneous Breathing Trial (SBT) RT Doc   Pulse 100   Breath Sounds   Right Upper Lobe Diminished   Right Middle Lobe Diminished   Right Lower Lobe Diminished   Left Upper Lobe Diminished   Left Lower Lobe Diminished   Additional Respiratory  Assessments   Resp 26   Position Semi-Worley's   Oral Care Completed?  Yes   Oral Care Mouth suctioned   Alarm Settings   High Pressure Alarm 45 cmH2O   Low Minute Volume Alarm 4 L/min   Apnea (secs) 20 secs   High Respiratory Rate 40 br/min   Low Exhaled Vt  250 mL   Non-Surgical Airway 09/14/21 Endo Tracheal Tube   Placement Date/Time: 09/14/21 3835   Placed By: In ED  Inserted by: Tere Mai MD   Insertion attempts: 1  Airway Device: Endo Tracheal Tube  Size: 8  Cuff Volume : 10 ml  Placement Verified By[de-identified] Auscultation   Secured at 27 cm   Measured From Lips   Secured Location Left   Secured By Commercial tube obando   Site Condition Dry

## 2021-09-17 ENCOUNTER — APPOINTMENT (OUTPATIENT)
Dept: GENERAL RADIOLOGY | Age: 66
DRG: 720 | End: 2021-09-17
Payer: COMMERCIAL

## 2021-09-17 LAB
A/G RATIO: 0.8 (ref 1.1–2.2)
ALBUMIN SERPL-MCNC: 2.8 G/DL (ref 3.4–5)
ALP BLD-CCNC: 73 U/L (ref 40–129)
ALT SERPL-CCNC: 67 U/L (ref 10–40)
ANION GAP SERPL CALCULATED.3IONS-SCNC: 11 MMOL/L (ref 3–16)
AST SERPL-CCNC: 74 U/L (ref 15–37)
BANDED NEUTROPHILS RELATIVE PERCENT: 4 % (ref 0–7)
BASE EXCESS ARTERIAL: -1.6 MMOL/L (ref -3–3)
BASOPHILS ABSOLUTE: 0 K/UL (ref 0–0.2)
BASOPHILS RELATIVE PERCENT: 0 %
BILIRUB SERPL-MCNC: 0.3 MG/DL (ref 0–1)
BUN BLDV-MCNC: 27 MG/DL (ref 7–20)
CALCIUM SERPL-MCNC: 7.6 MG/DL (ref 8.3–10.6)
CARBOXYHEMOGLOBIN ARTERIAL: 0.3 % (ref 0–1.5)
CHLORIDE BLD-SCNC: 100 MMOL/L (ref 99–110)
CO2: 22 MMOL/L (ref 21–32)
CREAT SERPL-MCNC: 0.9 MG/DL (ref 0.8–1.3)
EOSINOPHILS ABSOLUTE: 0 K/UL (ref 0–0.6)
EOSINOPHILS RELATIVE PERCENT: 0 %
GFR AFRICAN AMERICAN: >60
GFR NON-AFRICAN AMERICAN: >60
GLOBULIN: 3.4 G/DL
GLUCOSE BLD-MCNC: 140 MG/DL (ref 70–99)
GLUCOSE BLD-MCNC: 152 MG/DL (ref 70–99)
GLUCOSE BLD-MCNC: 152 MG/DL (ref 70–99)
GLUCOSE BLD-MCNC: 155 MG/DL (ref 70–99)
GLUCOSE BLD-MCNC: 160 MG/DL (ref 70–99)
GLUCOSE BLD-MCNC: 166 MG/DL (ref 70–99)
GLUCOSE BLD-MCNC: 168 MG/DL (ref 70–99)
HCO3 ARTERIAL: 23.3 MMOL/L (ref 21–29)
HCT VFR BLD CALC: 45.2 % (ref 40.5–52.5)
HEMOGLOBIN, ART, EXTENDED: 15.7 G/DL (ref 13.5–17.5)
HEMOGLOBIN: 15.1 G/DL (ref 13.5–17.5)
LV EF: 38 %
LVEF MODALITY: NORMAL
LYMPHOCYTES ABSOLUTE: 0.5 K/UL (ref 1–5.1)
LYMPHOCYTES RELATIVE PERCENT: 8 %
MCH RBC QN AUTO: 32.6 PG (ref 26–34)
MCHC RBC AUTO-ENTMCNC: 33.4 G/DL (ref 31–36)
MCV RBC AUTO: 97.4 FL (ref 80–100)
METHEMOGLOBIN ARTERIAL: 0.3 %
MONOCYTES ABSOLUTE: 0.4 K/UL (ref 0–1.3)
MONOCYTES RELATIVE PERCENT: 6 %
NEUTROPHILS ABSOLUTE: 5.8 K/UL (ref 1.7–7.7)
NEUTROPHILS RELATIVE PERCENT: 82 %
O2 SAT, ARTERIAL: 96.4 %
O2 THERAPY: NORMAL
PCO2 ARTERIAL: 40 MMHG (ref 35–45)
PDW BLD-RTO: 13.8 % (ref 12.4–15.4)
PERFORMED ON: ABNORMAL
PH ARTERIAL: 7.38 (ref 7.35–7.45)
PLATELET # BLD: 181 K/UL (ref 135–450)
PMV BLD AUTO: 8.7 FL (ref 5–10.5)
PO2 ARTERIAL: 86.1 MMHG (ref 75–108)
POTASSIUM REFLEX MAGNESIUM: 5.3 MMOL/L (ref 3.5–5.1)
PROCALCITONIN: 0.6 NG/ML (ref 0–0.15)
RBC # BLD: 4.64 M/UL (ref 4.2–5.9)
RBC # BLD: NORMAL 10*6/UL
SODIUM BLD-SCNC: 133 MMOL/L (ref 136–145)
TCO2 ARTERIAL: 24.5 MMOL/L
TOTAL PROTEIN: 6.2 G/DL (ref 6.4–8.2)
TRIGL SERPL-MCNC: 214 MG/DL (ref 0–150)
WBC # BLD: 6.7 K/UL (ref 4–11)

## 2021-09-17 PROCEDURE — 99233 SBSQ HOSP IP/OBS HIGH 50: CPT | Performed by: INTERNAL MEDICINE

## 2021-09-17 PROCEDURE — 94761 N-INVAS EAR/PLS OXIMETRY MLT: CPT

## 2021-09-17 PROCEDURE — 2580000003 HC RX 258: Performed by: INTERNAL MEDICINE

## 2021-09-17 PROCEDURE — 94640 AIRWAY INHALATION TREATMENT: CPT

## 2021-09-17 PROCEDURE — 6360000002 HC RX W HCPCS: Performed by: INTERNAL MEDICINE

## 2021-09-17 PROCEDURE — 84478 ASSAY OF TRIGLYCERIDES: CPT

## 2021-09-17 PROCEDURE — C9113 INJ PANTOPRAZOLE SODIUM, VIA: HCPCS | Performed by: INTERNAL MEDICINE

## 2021-09-17 PROCEDURE — 84145 PROCALCITONIN (PCT): CPT

## 2021-09-17 PROCEDURE — 94003 VENT MGMT INPAT SUBQ DAY: CPT

## 2021-09-17 PROCEDURE — 2000000000 HC ICU R&B

## 2021-09-17 PROCEDURE — 85025 COMPLETE CBC W/AUTO DIFF WBC: CPT

## 2021-09-17 PROCEDURE — 2500000003 HC RX 250 WO HCPCS: Performed by: INTERNAL MEDICINE

## 2021-09-17 PROCEDURE — 82803 BLOOD GASES ANY COMBINATION: CPT

## 2021-09-17 PROCEDURE — 80053 COMPREHEN METABOLIC PANEL: CPT

## 2021-09-17 PROCEDURE — 2700000000 HC OXYGEN THERAPY PER DAY

## 2021-09-17 PROCEDURE — 99291 CRITICAL CARE FIRST HOUR: CPT | Performed by: INTERNAL MEDICINE

## 2021-09-17 PROCEDURE — 36415 COLL VENOUS BLD VENIPUNCTURE: CPT

## 2021-09-17 PROCEDURE — 2580000003 HC RX 258: Performed by: PHYSICIAN ASSISTANT

## 2021-09-17 PROCEDURE — 6370000000 HC RX 637 (ALT 250 FOR IP): Performed by: INTERNAL MEDICINE

## 2021-09-17 PROCEDURE — 71045 X-RAY EXAM CHEST 1 VIEW: CPT

## 2021-09-17 PROCEDURE — 93306 TTE W/DOPPLER COMPLETE: CPT

## 2021-09-17 RX ORDER — METOPROLOL TARTRATE 5 MG/5ML
5 INJECTION INTRAVENOUS EVERY 6 HOURS
Status: DISCONTINUED | OUTPATIENT
Start: 2021-09-17 | End: 2021-09-19

## 2021-09-17 RX ORDER — FUROSEMIDE 10 MG/ML
40 INJECTION INTRAMUSCULAR; INTRAVENOUS ONCE
Status: COMPLETED | OUTPATIENT
Start: 2021-09-17 | End: 2021-09-17

## 2021-09-17 RX ORDER — FUROSEMIDE 10 MG/ML
40 INJECTION INTRAMUSCULAR; INTRAVENOUS 2 TIMES DAILY
Status: COMPLETED | OUTPATIENT
Start: 2021-09-17 | End: 2021-09-18

## 2021-09-17 RX ADMIN — IPRATROPIUM BROMIDE AND ALBUTEROL SULFATE 1 AMPULE: .5; 3 SOLUTION RESPIRATORY (INHALATION) at 16:16

## 2021-09-17 RX ADMIN — IPRATROPIUM BROMIDE AND ALBUTEROL SULFATE 1 AMPULE: .5; 3 SOLUTION RESPIRATORY (INHALATION) at 12:15

## 2021-09-17 RX ADMIN — WHITE PETROLATUM 57.7 %-MINERAL OIL 31.9 % EYE OINTMENT: at 05:39

## 2021-09-17 RX ADMIN — AMIODARONE HYDROCHLORIDE 0.5 MG/MIN: 50 INJECTION, SOLUTION INTRAVENOUS at 11:21

## 2021-09-17 RX ADMIN — IPRATROPIUM BROMIDE AND ALBUTEROL SULFATE 1 AMPULE: .5; 3 SOLUTION RESPIRATORY (INHALATION) at 23:10

## 2021-09-17 RX ADMIN — PANTOPRAZOLE SODIUM 40 MG: 40 INJECTION, POWDER, FOR SOLUTION INTRAVENOUS at 08:45

## 2021-09-17 RX ADMIN — WHITE PETROLATUM 57.7 %-MINERAL OIL 31.9 % EYE OINTMENT: at 08:46

## 2021-09-17 RX ADMIN — SODIUM CHLORIDE, PRESERVATIVE FREE 10 ML: 5 INJECTION INTRAVENOUS at 21:33

## 2021-09-17 RX ADMIN — PROPOFOL 25 MCG/KG/MIN: 10 INJECTION, EMULSION INTRAVENOUS at 05:38

## 2021-09-17 RX ADMIN — FUROSEMIDE 40 MG: 10 INJECTION, SOLUTION INTRAMUSCULAR; INTRAVENOUS at 18:08

## 2021-09-17 RX ADMIN — FUROSEMIDE 40 MG: 10 INJECTION, SOLUTION INTRAMUSCULAR; INTRAVENOUS at 08:46

## 2021-09-17 RX ADMIN — DEXTROSE MONOHYDRATE 500 MG: 50 INJECTION, SOLUTION INTRAVENOUS at 21:52

## 2021-09-17 RX ADMIN — WHITE PETROLATUM 57.7 %-MINERAL OIL 31.9 % EYE OINTMENT: at 21:54

## 2021-09-17 RX ADMIN — Medication 0.8 MCG/KG/HR: at 07:38

## 2021-09-17 RX ADMIN — CARBOXYMETHYLCELLULOSE SODIUM 1 DROP: 10 GEL OPHTHALMIC at 08:46

## 2021-09-17 RX ADMIN — METHYLPREDNISOLONE SODIUM SUCCINATE 40 MG: 40 INJECTION, POWDER, FOR SOLUTION INTRAMUSCULAR; INTRAVENOUS at 21:53

## 2021-09-17 RX ADMIN — Medication 0.8 MCG/KG/HR: at 18:14

## 2021-09-17 RX ADMIN — METHYLPREDNISOLONE SODIUM SUCCINATE 40 MG: 40 INJECTION, POWDER, FOR SOLUTION INTRAMUSCULAR; INTRAVENOUS at 08:57

## 2021-09-17 RX ADMIN — ENOXAPARIN SODIUM 105 MG: 150 INJECTION SUBCUTANEOUS at 21:32

## 2021-09-17 RX ADMIN — ENOXAPARIN SODIUM 105 MG: 150 INJECTION SUBCUTANEOUS at 08:47

## 2021-09-17 RX ADMIN — Medication 0.8 MCG/KG/HR: at 13:13

## 2021-09-17 RX ADMIN — METOPROLOL TARTRATE 5 MG: 5 INJECTION INTRAVENOUS at 18:08

## 2021-09-17 RX ADMIN — SODIUM CHLORIDE, PRESERVATIVE FREE 10 ML: 5 INJECTION INTRAVENOUS at 08:55

## 2021-09-17 RX ADMIN — WHITE PETROLATUM 57.7 %-MINERAL OIL 31.9 % EYE OINTMENT: at 18:41

## 2021-09-17 RX ADMIN — CHLORHEXIDINE GLUCONATE 0.12% ORAL RINSE 15 ML: 1.2 LIQUID ORAL at 21:33

## 2021-09-17 RX ADMIN — METOPROLOL TARTRATE 5 MG: 5 INJECTION INTRAVENOUS at 12:12

## 2021-09-17 RX ADMIN — CARBOXYMETHYLCELLULOSE SODIUM 1 DROP: 10 GEL OPHTHALMIC at 00:11

## 2021-09-17 RX ADMIN — FOLIC ACID: 5 INJECTION, SOLUTION INTRAMUSCULAR; INTRAVENOUS; SUBCUTANEOUS at 10:12

## 2021-09-17 RX ADMIN — Medication 0.8 MCG/KG/HR: at 23:29

## 2021-09-17 RX ADMIN — WHITE PETROLATUM 57.7 %-MINERAL OIL 31.9 % EYE OINTMENT: at 02:08

## 2021-09-17 RX ADMIN — IPRATROPIUM BROMIDE AND ALBUTEROL SULFATE 1 AMPULE: .5; 3 SOLUTION RESPIRATORY (INHALATION) at 20:25

## 2021-09-17 RX ADMIN — SODIUM CHLORIDE: 9 INJECTION, SOLUTION INTRAVENOUS at 05:38

## 2021-09-17 RX ADMIN — PROPOFOL 20 MCG/KG/MIN: 10 INJECTION, EMULSION INTRAVENOUS at 18:15

## 2021-09-17 RX ADMIN — CHLORHEXIDINE GLUCONATE 0.12% ORAL RINSE 15 ML: 1.2 LIQUID ORAL at 08:47

## 2021-09-17 RX ADMIN — PROPOFOL 20 MCG/KG/MIN: 10 INJECTION, EMULSION INTRAVENOUS at 13:11

## 2021-09-17 RX ADMIN — IPRATROPIUM BROMIDE AND ALBUTEROL SULFATE 1 AMPULE: .5; 3 SOLUTION RESPIRATORY (INHALATION) at 08:41

## 2021-09-17 RX ADMIN — CARBOXYMETHYLCELLULOSE SODIUM 1 DROP: 10 GEL OPHTHALMIC at 17:16

## 2021-09-17 RX ADMIN — CARBOXYMETHYLCELLULOSE SODIUM 1 DROP: 10 GEL OPHTHALMIC at 04:57

## 2021-09-17 RX ADMIN — IPRATROPIUM BROMIDE AND ALBUTEROL SULFATE 1 AMPULE: .5; 3 SOLUTION RESPIRATORY (INHALATION) at 03:02

## 2021-09-17 RX ADMIN — MIDAZOLAM HYDROCHLORIDE 2 MG: 1 INJECTION, SOLUTION INTRAMUSCULAR; INTRAVENOUS at 10:47

## 2021-09-17 RX ADMIN — PROPOFOL 30 MCG/KG/MIN: 10 INJECTION, EMULSION INTRAVENOUS at 00:04

## 2021-09-17 RX ADMIN — MUPIROCIN: 20 OINTMENT TOPICAL at 21:53

## 2021-09-17 RX ADMIN — CARBOXYMETHYLCELLULOSE SODIUM 1 DROP: 10 GEL OPHTHALMIC at 21:33

## 2021-09-17 RX ADMIN — Medication 0.9 MCG/KG/HR: at 02:08

## 2021-09-17 RX ADMIN — CARBOXYMETHYLCELLULOSE SODIUM 1 DROP: 10 GEL OPHTHALMIC at 12:17

## 2021-09-17 ASSESSMENT — PULMONARY FUNCTION TESTS
PIF_VALUE: 33
PIF_VALUE: 40
PIF_VALUE: 29
PIF_VALUE: 31
PIF_VALUE: 29
PIF_VALUE: 32
PIF_VALUE: 31
PIF_VALUE: 29
PIF_VALUE: 12
PIF_VALUE: 28
PIF_VALUE: 31
PIF_VALUE: 29
PIF_VALUE: 31
PIF_VALUE: 28
PIF_VALUE: 32
PIF_VALUE: 13
PIF_VALUE: 34
PIF_VALUE: 29
PIF_VALUE: 36
PIF_VALUE: 31
PIF_VALUE: 35
PIF_VALUE: 33
PIF_VALUE: 29
PIF_VALUE: 28
PIF_VALUE: 31
PIF_VALUE: 30

## 2021-09-17 ASSESSMENT — PAIN SCALES - GENERAL: PAINLEVEL_OUTOF10: 0

## 2021-09-17 NOTE — FLOWSHEET NOTE
09/17/21 0800   Vitals   Temp 98.3 °F (36.8 °C)   Temp Source Axillary   Pulse 98   Heart Rate Source Monitor   Resp 19   /72   MAP (mmHg) 84   BP Method Automatic   Pain Assessment   RASS Score -2   Oxygen Therapy   SpO2 96 %   O2 Device Ventilator   FiO2  30 %   Vent Settings   Rate Set 26 bmp   Rate Measured 26 br/min   Vt Ordered 480 mL   Vt Exhaled 517 mL   PEEP/CPAP 5   Pressure Support 0 cmH20   Peak Inspiratory Pressure 40 cmH2O   Vital signs stable. Shift assessment, completed, see flow sheet. RASS -2. Intubated and sedated with # 8 ETT, at 26 LL. 26 / 480 /30 %/ 5. NSR, Respirations are easy, even, and unlabored. Bilateral lung sounds diminished. OG remains to suction, Bowel sounds active. PIV's, WNL with propofol at 15 mcg/kg/min. Precedex weaned from 0.8 mcg/kg/hr to 0.4 mcg/kg/hr in preparation for SBT. Amio gtt continues at 16.7 ml/hr. Avina in place and draining clear yellow urine. Pt repositioned in bed. Call light within reach. Bed in lowest position. Bed alarm on.  Will continue to monitor

## 2021-09-17 NOTE — FLOWSHEET NOTE
09/17/21 1200   Vitals   Temp 97.8 °F (36.6 °C)   Pulse 95   Heart Rate Source Monitor   Resp 26   /69   MAP (mmHg) 86   BP Method Automatic   Oxygen Therapy   SpO2 97 %   O2 Device Ventilator   FiO2  30 %   Vent Settings   Rate Set 26 bmp   Rate Measured 26 br/min   Vt Ordered 480 mL   Vt Exhaled 508 mL   PEEP/CPAP 5   Pressure Support 0 cmH20   Peak Inspiratory Pressure 29 cmH2O   Reassessment completed. Pt much more calm. HR, RR, BP much improved with sedation. Precedex continues at 0.8 mcg/kg/hr. Propofol continues at 20 mcg/kg/min. Amio gtt 16.7 ml/hr. Pt repositioned for comfort. Will continue to monitor.

## 2021-09-17 NOTE — PROGRESS NOTES
Pulmonary & Critical Care Medicine ICU Progress Note    CC: Shortness of breath    Events of Last 24 hours: Failed SBT    Invasive Lines: Peripheral    MV: 2021  Vent Mode: AC/VC Rate Set: 26 bmp/Vt Ordered: 480 mL/ /FiO2 : 30 %  Recent Labs     21  0855 21   PHART 7.328* 7.383   EAM1IYI 49.8* 40.0   PO2ART 84.2 86.1       IV:   dexmedetomidine HCl in NaCl 0.8 mcg/kg/hr (21)    dextrose      amiodarone 0.5 mg/min (21 0506)    sodium chloride 75 mL/hr at 21    sodium chloride      propofol 15 mcg/kg/min (21)       Vitals:  Blood pressure 124/79, pulse 94, temperature 99 °F (37.2 °C), temperature source Axillary, resp. rate 26, height 5' 10\" (1.778 m), weight 251 lb 12.3 oz (114.2 kg), SpO2 99 %. on vent  Temp  Av.4 °F (36.9 °C)  Min: 97.1 °F (36.2 °C)  Max: 99.5 °F (37.5 °C)    Intake/Output Summary (Last 24 hours) at 2021 0754  Last data filed at 2021 0600  Gross per 24 hour   Intake 3276.57 ml   Output 2025 ml   Net 1251.57 ml     EXAM:  General: intubated, ill appearing    ENT: Pharynx with ETT. Resp: No crackles. No wheezing  CV: S1, S2. Trace edema  GI: NT, ND, +BS  Skin: Warm and dry. Neuro: PERRL. Sedated, not following commands.  Patellar reflexes are symmetric     Scheduled Meds:   insulin lispro  0-12 Units SubCUTAneous Q4H    chlorhexidine  15 mL Mouth/Throat BID    pantoprazole  40 mg IntraVENous Daily    carboxymethylcellulose PF  1 drop Both Eyes Q4H    artificial tears   Both Eyes Q4H    enoxaparin  1 mg/kg SubCUTAneous BID    folic acid, thiamine, multi-vitamin with vitamin K infusion   IntraVENous Daily    mupirocin   Nasal BID    ipratropium-albuterol  1 ampule Inhalation Q4H    cefTRIAXone (ROCEPHIN) IV  2,000 mg IntraVENous Q24H    azithromycin  500 mg IntraVENous Q24H    methylPREDNISolone  40 mg IntraVENous Q12H    nicotine  1 patch TransDERmal Daily    sodium chloride flush  5-40 mL IntraVENous 2 times per day     PRN Meds:  albuterol, perflutren lipid microspheres, midazolam, fentanNYL, levalbuterol, glucose, dextrose, glucagon (rDNA), dextrose, polyethylene glycol, acetaminophen **OR** acetaminophen, guaiFENesin-dextromethorphan, prochlorperazine, sodium chloride flush, sodium chloride, LORazepam **OR** LORazepam **OR** LORazepam **OR** LORazepam **OR** LORazepam **OR** LORazepam **OR** LORazepam **OR** LORazepam    Results:  CBC:   Recent Labs     09/15/21  0637 09/16/21  0418 09/17/21  0423   WBC 8.4 8.8 6.7   HGB 14.0 15.1 15.1   HCT 41.7 45.6 45.2   MCV 98.3 98.8 97.4    151 181     BMP:   Recent Labs     09/15/21  0637 09/16/21  0418 09/17/21  0423   * 130* 133*   K 4.3 5.1 5.3*   CL 96* 99 100   CO2 21 19* 22   BUN 17 22* 27*   CREATININE 0.8 0.8 0.9     LIVER PROFILE:   Recent Labs     09/15/21  0637 09/16/21  0418 09/17/21  0423   AST 89* 106* 74*   ALT 55* 76* 67*   BILITOT 0.4 0.3 0.3   ALKPHOS 82 90 73       Cultures:  9/14/2021 SARS-CoV-2 NAAT and PCR negative  9/14/2021 blood NGTD  9/15/2021 urine antigens are negative  9/15/2021 tracheal aspirate sent    Films:  9/14/21 CTPA   Pulmonary Arteries: Pulmonary arteries are adequately opacified for   evaluation.  No evidence of intraluminal filling defect to suggest pulmonary   embolism.  Main pulmonary artery is normal in caliber.       Mediastinum: The central airways are clear.  Right hilar lymphadenopathy   measuring 2.3 x 1.1 cm (2, 135).  The heart and pericardium demonstrate no   acute abnormality.  Atherosclerotic disease of the thoracic aorta.       Lungs/pleura: Irregular right upper lobe nodule measures 2.4 x 1.5 cm (2,   51).   Right lower lobe consolidation and scattered bilateral ground-glass   opacities, consistent with COVID-19 pneumonia.  Panlobular emphysema.  No   pleural effusion or pneumothorax.       Upper Abdomen: Right adrenal gland nodule measuring 1.6 cm.  Left adrenal   gland hyperplasia.  Rest of visualized upper abdomen is unremarkable.       Soft Tissues/Bones: Degenerate disease of the thoracic spine.  No focal   osseous lesion.  Visualized soft tissues are unremarkable.           Impression   No evidence of pulmonary embolism.       COVID-19 pneumonia as described above.       Irregular right upper lobe mass as measured above. CXR 9/17/2021 ET tube okay     ASSESSMENT:  · Acute hypoxic and hypercapnic respiratory failure - failed BiPAP  · Community acquired pneumonia - I favor bacterial bronchopneumonia   · Hyperkalemia  · Atrial fibrillation with RVR   · 2.4 cm right upper lobe pulmonary nodule, appears concerning for malignancy. He has a remote history of a pulmonary nodule, he thinks sometime in the 90s, he thinks it was on the left side. · Right hilar lymphadenopathy, concerning for malignancy  · Right adrenal nodule 1.6 cm  · Mild transaminitis, h/o alcohol use/abuse   · Hyperglycemia     PLAN:  Mechanical ventilation as per my orders. The ventilator was adjusted by me at the bedside for unstable, life threatening respiratory failure. IV Propofol and Precedex for sedation, target RASS -2, with daily spontaneous awakening trial.  Fentanyl PRN pain, gtt as needed. CTX/Azithromycin D#4  DC IV fluids, start Lasix  IV solumedrol twice daily  Tobacco cessation is recommended   · Will ask radiology to review the adrenal nodule  · Probable outpatient CT PET for suspicious Pulmonary Nodule/hilar lymphadenopathy   · Prophylaxis: On full dose Lovenox due to A. fib, Protonix and Bactroban  · NOK brother (sister-in-law) [facility] [phone]     Total critical care time caring for this patient with life threatening, unstable organ failure, including direct patient contact, management of life support systems, review of data including imaging and labs, discussions with other team members and physicians is 35 minutes so far today, excluding procedures.

## 2021-09-17 NOTE — PROGRESS NOTES
09/16/21 2323   Vent Information   Vent Type 840   Vent Mode AC/VC   Vt Ordered 480 mL   Rate Set 26 bmp   Peak Flow 70 L/min   Pressure Support 0 cmH20   FiO2  30 %   SpO2 99 %   SpO2/FiO2 ratio 330   Sensitivity 3   PEEP/CPAP 5   I Time/ I Time % 0 s   Humidification Source Heated wire   Humidification Temp 37   Humidification Temp Measured 37   Circuit Condensation Drained   Vent Patient Data   High Peep/I Pressure 0   Peak Inspiratory Pressure 29 cmH2O   Mean Airway Pressure 13 cmH20   Rate Measured 26 br/min   Vt Exhaled 485 mL   Minute Volume 13.9 Liters   I:E Ratio 1:2.00   Plateau Pressure 22 TGW16   Cough/Sputum   Sputum How Obtained Suctioned;Endotracheal   Spontaneous Breathing Trial (SBT) RT Doc   Pulse 107   Breath Sounds   Right Upper Lobe Diminished   Right Middle Lobe Diminished   Right Lower Lobe Diminished   Left Upper Lobe Diminished   Left Lower Lobe Diminished   Additional Respiratory  Assessments   Resp 27   Position Semi-Worley's   Oral Care Completed?  Yes   Oral Care Mouth suctioned   Alarm Settings   High Pressure Alarm 45 cmH2O   Low Minute Volume Alarm 4 L/min   Apnea (secs) 20 secs   High Respiratory Rate 40 br/min   Low Exhaled Vt  250 mL   Non-Surgical Airway 09/14/21 Endo Tracheal Tube   Placement Date/Time: 09/14/21 8752   Placed By: In ED  Inserted by: Ge Aleman MD   Insertion attempts: 1  Airway Device: Endo Tracheal Tube  Size: 8  Cuff Volume : 10 ml  Placement Verified By[de-identified] Auscultation   Secured at 26 cm   Measured From [address] By [employer] tube obando   Site Condition Dry

## 2021-09-17 NOTE — PROGRESS NOTES
Wasted 65 mL of Midazolam with *** RN. Med disposed of into the Rx Destroyer per protocol.     Primary Nurse eSignature: Electronically signed by Ariana Burrows RN on 8/27/58 at 8:49 PM EDT    **SHARE this note so that the co-signing nurse is able to place an eSignature**    Co-signer eSignature: {Esignature:[id number]}

## 2021-09-17 NOTE — PROGRESS NOTES
[address] Daily Progress Note      Admit Date:  9/14/2021    Subjective:  Mr. Ifeoma Barrera is seen for cardiology follow up  He is intubated on respirator  He is sedated on amiodarone drip and anticoagulated with lovenox  [place]   Initial consult by Dr Gilford Rivet as follows  Reyes Scudder is a 61 y.o. patient with a prior medical history notable for tobacco use, who presented to the hospital 9/14/21 with complaints of 3 days of shortness of breath and productive cough, fevers, chills and GI symptoms. He was found to be in hypoxic respiratory failure with increased work of breathing.      Patient seen with pulm/Dr. Cathe Aschoff who favors bacterial PNA. Combinations COVID-19/Flu testing negative as well as COVID-19 rapid and PCR, remains in droplet. Recommended to start BiPAP for WOB, steroids, anti-microbials. CT chest showed scattered GGO's, emphysema and RUL nodule. Initial EKG showed normal sinus rhythm, extreme R axis, RSR', poor R wave progression/possible septal infarct.     Unfortunately patient's condition deteriorated requiring mechanical intubation yesterday late evening. History is gathered from chart review and in discussion with treating teams. Today's EKG shows atrial fibrillation  bpm for which cardiology is consulted.      Care discussed with bedside RN. Patient went into AF abruptly this AM ~6:30 by tele review. He has broke once but back in AF, up to 130's rates. Not yet received therapy. Remains intubated/sedated on propofol and precedex.  No pressors at this point.      ROS:  Patient intubated    Objective:   /72   Pulse 105   Temp 98.3 °F (36.8 °C) (Axillary)   Resp 26   Ht 5' 10\" (1.778 m)   Wt 251 lb 12.3 oz (114.2 kg)   SpO2 98%   BMI 36.12 kg/m²     Intake/Output Summary (Last 24 hours) at 9/17/2021 0902  Last data filed at 9/17/2021 0840  Gross per 24 hour   Intake 3276.57 ml   Output 2275 ml   Net 1001.57 ml       TELEMETRY:  afib rate is controlled   Physical Exam:  General: Intubated on respirator  appears well developed and well nourished. Eyes:  Sclera nonicteric  Nose/Sinuses:  negative findings: nose shows no deformity, asymmetry, or inflammation, nasal mucosa normal, septum midline with no perforation or bleeding  Back:  no pain to palpation  Joint:  no active joint inflammation  Musculoskeletal:  negative  Skin:  Warm and dry  Neck:  Negative for JVD and Carotid Bruits. Chest:  Clear to auscultation, respiration easy  Cardiovascular:  irreg irreg S2 normal, no murmur, no rub or thrill.   Abdomen:  Soft normal liver and spleen  Extremities:   No edema, clubbing, cyanosis,  Pulses: pedal pulses are normal.  Neuro: sedated    Medications:    insulin lispro  0-12 Units SubCUTAneous Q4H    chlorhexidine  15 mL Mouth/Throat BID    pantoprazole  40 mg IntraVENous Daily    carboxymethylcellulose PF  1 drop Both Eyes Q4H    artificial tears   Both Eyes Q4H    enoxaparin  1 mg/kg SubCUTAneous BID    folic acid, thiamine, multi-vitamin with vitamin K infusion   IntraVENous Daily    mupirocin   Nasal BID    ipratropium-albuterol  1 ampule Inhalation Q4H    cefTRIAXone (ROCEPHIN) IV  2,000 mg IntraVENous Q24H    azithromycin  500 mg IntraVENous Q24H    methylPREDNISolone  40 mg IntraVENous Q12H    nicotine  1 patch TransDERmal Daily    sodium chloride flush  5-40 mL IntraVENous 2 times per day      dexmedetomidine HCl in NaCl 0.4 mcg/kg/hr (09/17/21 0851)    dextrose      amiodarone 0.5 mg/min (09/17/21 0506)    sodium chloride      propofol Stopped (09/17/21 0838)     albuterol, perflutren lipid microspheres, midazolam, fentanNYL, levalbuterol, glucose, dextrose, glucagon (rDNA), dextrose, polyethylene glycol, acetaminophen **OR** acetaminophen, guaiFENesin-dextromethorphan, prochlorperazine, sodium chloride flush, sodium chloride, LORazepam **OR** LORazepam **OR** LORazepam **OR** LORazepam **OR** LORazepam **OR** LORazepam **OR** LORazepam **OR** LORazepam    Lab Data:  CBC:   Recent Labs     09/15/21  0637 09/16/21  0418 09/17/21  0423   WBC 8.4 8.8 6.7   HGB 14.0 15.1 15.1   HCT 41.7 45.6 45.2   MCV 98.3 98.8 97.4    151 181     BMP:   Recent Labs     09/15/21  0637 09/16/21  0418 09/17/21  0423   * 130* 133*   K 4.3 5.1 5.3*   CL 96* 99 100   CO2 21 19* 22   BUN 17 22* 27*   CREATININE 0.8 0.8 0.9     LIVER PROFILE:   Recent Labs     09/15/21  0637 09/16/21  0418 09/17/21  0423   AST 89* 106* 74*   ALT 55* 76* 67*   BILITOT 0.4 0.3 0.3   ALKPHOS 82 90 73     PT/INR: No results for input(s): PROTIME, INR in the last 72 hours. APTT: No results for input(s): APTT in the last 72 hours. BNP:  No results for input(s): BNP in the last 72 hours. IMAGING:     Assessment:  1. Atrial fibrillations acute onset this admission  2. Acute hypoxic resp failure  3. Pneumonia  He is euthyroid    Patient Active Problem List    Diagnosis Date Noted    Atrial fibrillation with rapid ventricular response ([address]     Acute respiratory failure with hypoxia ([facility] [address] 09/14/2021    Multifocal pneumonia 09/14/2021    Suspected COVID-19 virus infection 09/14/2021    Sepsis ([address] 09/14/2021    Acute on chronic respiratory failure with hypoxia ([address] 09/14/2021    Pneumonia due to COVID-19 virus        Plan:  1. Switch to PO amiodarone when PO allowed 200 mg BID  2. Change anticoagulation to PO newer anticoagulants  3. Will not cardiovert now as he has pneumonia and resp failure probably will go back in to it  4.  Will review echocardiogram when completed  ·     Fabián Chavez MD, MD 9/17/2021 9:02 AM

## 2021-09-17 NOTE — PROGRESS NOTES
SBT failed. Precedex titrated up to 0.8 mcg/kg/hr. Propofol reinitiated at 20 mcg/kg/min. PRN versed provided for agitation. Will continue to monitor.

## 2021-09-17 NOTE — PROGRESS NOTES
Progress Note    Admit Date:  9/14/2021    Subjective:  Mr. Tania Bloch continues to be on the vent    9/17  Did poorly on SBT  Hyperkalemic today    Objective:   Patient Vitals for the past 4 hrs:   BP Temp Temp src Pulse Resp SpO2   09/17/21 1400 119/89 -- -- 103 26 97 %   09/17/21 1300 123/78 -- -- 102 26 97 %   09/17/21 1216 -- -- -- 101 26 98 %   09/17/21 1215 -- -- -- -- 26 98 %   09/17/21 1200 130/69 97.8 °F (36.6 °C) -- 95 26 97 %   09/17/21 1133 -- 98.2 °F (36.8 °C) Axillary -- -- --   09/17/21 1100 118/79 -- -- 114 26 96 %       Intake/Output Summary (Last 24 hours) at 9/17/2021 1435  Last data filed at 9/17/2021 1207  Gross per 24 hour   Intake 3924.06 ml   Output 3875 ml   Net 49.06 ml     Physical Exam:    GEN: Sedated, on the vent  HEENT: NC/AT,EOMI, semi dry MM, no erythema/exudates or visible masses. CVS: Normal S1,S2. Tachy RRR. Without M/G/R.   LUNG: Bilat wheezes and course central BS. Tachypnea, diminished/tight bilat. ABD: Soft, ND/NT, BS+ x4. Without G/R.  EXT: 2+ pulses, no c/c/e. Brisk cap refill. LORRAINE:  Sedated. SKIN: No rash or lesions on visible skin.     Scheduled Meds:   metoprolol  5 mg IntraVENous Q6H    furosemide  40 mg IntraVENous BID    insulin lispro  0-12 Units SubCUTAneous Q4H    chlorhexidine  15 mL Mouth/Throat BID    pantoprazole  40 mg IntraVENous Daily    carboxymethylcellulose PF  1 drop Both Eyes Q4H    artificial tears   Both Eyes Q4H    enoxaparin  1 mg/kg SubCUTAneous BID    folic acid, thiamine, multi-vitamin with vitamin K infusion   IntraVENous Daily    mupirocin   Nasal BID    ipratropium-albuterol  1 ampule Inhalation Q4H    cefTRIAXone (ROCEPHIN) IV  2,000 mg IntraVENous Q24H    azithromycin  500 mg IntraVENous Q24H    methylPREDNISolone  40 mg IntraVENous Q12H    nicotine  1 patch TransDERmal Daily    sodium chloride flush  5-40 mL IntraVENous 2 times per day       Continuous Infusions:   dexmedetomidine HCl in NaCl 0.8 mcg/kg/hr (09/17/21 1313)    dextrose      amiodarone 0.5 mg/min (09/17/21 1207)    sodium chloride      propofol 20 mcg/kg/min (09/17/21 1311)       PRN Meds:  albuterol, perflutren lipid microspheres, midazolam, fentanNYL, levalbuterol, glucose, dextrose, glucagon (rDNA), dextrose, polyethylene glycol, acetaminophen **OR** acetaminophen, guaiFENesin-dextromethorphan, prochlorperazine, sodium chloride flush, sodium chloride, LORazepam **OR** LORazepam **OR** LORazepam **OR** LORazepam **OR** LORazepam **OR** LORazepam **OR** LORazepam **OR** LORazepam      Data:  CBC:   Recent Labs     09/15/21  0637 09/16/21 0418 09/17/21  0423   WBC 8.4 8.8 6.7   HGB 14.0 15.1 15.1   HCT 41.7 45.6 45.2   MCV 98.3 98.8 97.4    151 181     BMP:   Recent Labs     09/15/21  0637 09/16/21  0418 09/17/21  0423   * 130* 133*   K 4.3 5.1 5.3*   CL 96* 99 100   CO2 21 19* 22   BUN 17 22* 27*   CREATININE 0.8 0.8 0.9     LIVER PROFILE:   Recent Labs     09/15/21  0637 09/16/21 0418 09/17/21  0423   AST 89* 106* 74*   ALT 55* 76* 67*   BILITOT 0.4 0.3 0.3   ALKPHOS 82 90 73     CULTURES    Urine legionella: negative    Strep pneumo: negative    Blood cx x2: pending    Resp cx: pending    SARS-COV-2 - Rapid PCR: Not detected     Rapid Influenza A/B: negative      SARS-COV-2 - Rapid: Not detected      RADIOLOGY    XR CHEST PORTABLE   Final Result   No substantial interval change in mild bibasilar airspace disease as compared   to prior. XR CHEST PORTABLE   Final Result   Improving aeration of the lungs. XR CHEST PORTABLE   Final Result   Lines and tubes as described. Multifocal airspace disease appears similar to prior exam.         CT CHEST PULMONARY EMBOLISM W CONTRAST   Final Result   No evidence of pulmonary embolism. COVID-19 pneumonia as described above. Irregular right upper lobe mass as measured above.       RECOMMENDATIONS:   Chest CT in 3 months is recommended for follow-up evaluation of right upper lobe irregular lesion. XR CHEST PORTABLE   Final Result   1. Nodular/ill-defined opacity in the right upper lung zone. Recommend   dedicated noncontrast CT for further evaluation and to exclude a nodule. 2. Patchy right-sided pulmonary opacities are noted, which could represent   multifocal pneumonia, atelectasis or atypical edema. 3. Small bilateral pleural fluid. XR CHEST PORTABLE    (Results Pending)       Assessment/Plan:    Acute respiratory failure with hypoxia and hypercapnia  - likely related to #3  - Admitted to ICU, tele  - supplemental O2 to maintain SPO2 ? 92%, cont pulse ox  - Wean as tolerated. Started on BiPAP per my orders. ABG's/CXRs ordered  - pt intubated on 9/14  - Intensivist c/s. Failed SBT today. Sepsis  - 2/2 PNA  -  ABX as below  - No need for pressors at this time  -  F/u blood, resp cx    Multifocal PNA  - (CAP - likely GP organism) w/ concern for underlying chronic lung path (COPD, asthma, etc); PCT 1.02  -  IV solumedrol, IV azithro/ceftriaxone day #3  - PRN/[facility] intensive NEB therapy. Check respiratory culture; urine legionella and strep pnuemo - neg  - Pulm consult. Hold home regimen for now. Atrial Fibrillation with RVR  - unknown hx; TSH normal  - full dose lovenox  -On amiodarone drip. - cardiology following -   Echocardiogram pending    Hyponatremia  - 127 on arrival  - IVF and repeat  Resolving    Hyperglycemia  - likely 2/2 steroids  - low dose SSI, check Hgb A1c  - PRN hypoglycemia protocol in place    Transaminitis  - slightly worsened from admission  - no priors available for comparison  - monitor LFTs    Irregular RUL mass  - 2.4 cm in greatest dimension. Hilar LAD  - Will need OP follow up/imaging in 3 mos. No PCP per Epic. Prolonged QTc  - 474 ms, avoid QT prolonging agents as able  - tele    Tobacco Abuse  - counseled cessation  - 14 mg nicotine patch.      DVT Prophylaxis: Lovenox  Diet: Diet NPO Exceptions are: [employer], Sips of

## 2021-09-17 NOTE — CARE COORDINATION
INTERDISCIPLINARY PLAN OF CARE CONFERENCE    Date/Time: 9/17/2021 2:42 PM  Completed by: Jose Manuel Chacon RN, Case Management      Patient Name:  Delta Jorge  YOB: 1955  Admitting Diagnosis: Septicemia ([facility] [A41.9]  Acute respiratory failure with hypoxia ([address] [J96.01]  Acute on chronic respiratory failure with hypoxia ([facility] [J96.21]  Pneumonia due to COVID-19 virus [U07.1, J12.82]     Admit Date/Time:  9/14/2021 12:11 PM    Chart reviewed. Interdisciplinary team contacted or reviewed plan related to patient progress and discharge plans. Disciplines included Case Management, Nursing, and Dietitian. Current Status:inpt, ICU LOC  PT/OT recommendation for discharge plan of care: tbd    Expected D/C Disposition:  tbd    Discharge Plan Comments: Reviewed chart. Pt in ICU on Ventilator, pt failed SBT this AM. Pt from home with brother/family. Pt IPTA.  following    Home O2 in place on admit: No

## 2021-09-17 NOTE — PROGRESS NOTES
09/17/21 0302   Vent Information   Vent Type 840   Vent Mode AC/VC   Vt Ordered 480 mL   Rate Set 26 bmp   Peak Flow 70 L/min   Pressure Support 0 cmH20   FiO2  30 %   SpO2 98 %   SpO2/FiO2 ratio 326.67   Sensitivity 3   PEEP/CPAP 5   I Time/ I Time % 0 s   Humidification Source Heated wire   Humidification Temp 37   Humidification Temp Measured 37   Circuit Condensation Drained   Vent Patient Data   High Peep/I Pressure 0   Peak Inspiratory Pressure 28 cmH2O   Mean Airway Pressure 13 cmH20   Rate Measured 27 br/min   Vt Exhaled 497 mL   Minute Volume 13.5 Liters   I:E Ratio 1:2.10   Plateau Pressure 22 QWO42   Cough/Sputum   Sputum How Obtained Suctioned;Endotracheal   Cough Non-productive   Spontaneous Breathing Trial (SBT) RT Doc   Pulse 102   Breath Sounds   Right Upper Lobe Diminished   Right Middle Lobe Diminished   Right Lower Lobe Diminished   Left Upper Lobe Diminished   Left Lower Lobe Diminished   Additional Respiratory  Assessments   Resp 26   Position Semi-Worley's   Oral Care Completed?  Yes   Oral Care Mouth suctioned   Alarm Settings   High Pressure Alarm 45 cmH2O   Low Minute Volume Alarm 4 L/min   Apnea (secs) 20 secs   High Respiratory Rate 40 br/min   Low Exhaled Vt  250 mL   Non-Surgical Airway 09/14/21 Endo Tracheal Tube   Placement Date/Time: 09/14/21 9145   Placed By: In ED  Inserted by: Kelvin Price MD   Insertion attempts: 1  Airway Device: Endo Tracheal Tube  Size: 8  Cuff Volume : 10 ml  Placement Verified By[de-identified] Auscultation   Secured at 26 cm   Measured From Lips   Secured Location Right   Secured By Commercial tube obando   Site Condition Dry

## 2021-09-17 NOTE — FLOWSHEET NOTE
09/17/21 1500   Vitals   Temp 98.1 °F (36.7 °C)   Pulse 93   Heart Rate Source Monitor   Resp 26   /82   MAP (mmHg) 97   BP Method Automatic   Oxygen Therapy   SpO2 98 %   O2 Device Ventilator   FiO2  30 %   Vent Settings   Rate Set 26 bmp   Rate Measured 26 br/min   Vt Ordered 480 mL   Vt Exhaled 512 mL   PEEP/CPAP 5   Pressure Support 0 cmH20   Peak Inspiratory Pressure 29 cmH2O   Reassessment completed. No significant changes from previous assessment. Pt remains calm RASS -2. Precedex continues at 0.8 mcg/kg/hr. Propofol continues at 20 mcg/kg/min. Amio gtt 16.7 ml/hr. Pt repositioned for comfort. Family updated at bedside.  Will continue to monitor

## 2021-09-18 ENCOUNTER — APPOINTMENT (OUTPATIENT)
Dept: GENERAL RADIOLOGY | Age: 66
DRG: 720 | End: 2021-09-18
Payer: COMMERCIAL

## 2021-09-18 LAB
A/G RATIO: 0.8 (ref 1.1–2.2)
ALBUMIN SERPL-MCNC: 2.8 G/DL (ref 3.4–5)
ALP BLD-CCNC: 64 U/L (ref 40–129)
ALT SERPL-CCNC: 48 U/L (ref 10–40)
ANION GAP SERPL CALCULATED.3IONS-SCNC: 8 MMOL/L (ref 3–16)
AST SERPL-CCNC: 45 U/L (ref 15–37)
BASE EXCESS ARTERIAL: 4.5 MMOL/L (ref -3–3)
BASOPHILS ABSOLUTE: 0 K/UL (ref 0–0.2)
BASOPHILS RELATIVE PERCENT: 0.6 %
BILIRUB SERPL-MCNC: 0.3 MG/DL (ref 0–1)
BLOOD CULTURE, ROUTINE: NORMAL
BUN BLDV-MCNC: 31 MG/DL (ref 7–20)
CALCIUM SERPL-MCNC: 8 MG/DL (ref 8.3–10.6)
CARBOXYHEMOGLOBIN ARTERIAL: 0.2 % (ref 0–1.5)
CHLORIDE BLD-SCNC: 102 MMOL/L (ref 99–110)
CO2: 27 MMOL/L (ref 21–32)
CREAT SERPL-MCNC: 0.8 MG/DL (ref 0.8–1.3)
EOSINOPHILS ABSOLUTE: 0 K/UL (ref 0–0.6)
EOSINOPHILS RELATIVE PERCENT: 0.3 %
GFR AFRICAN AMERICAN: >60
GFR NON-AFRICAN AMERICAN: >60
GLOBULIN: 3.4 G/DL
GLUCOSE BLD-MCNC: 137 MG/DL (ref 70–99)
GLUCOSE BLD-MCNC: 142 MG/DL (ref 70–99)
GLUCOSE BLD-MCNC: 144 MG/DL (ref 70–99)
GLUCOSE BLD-MCNC: 144 MG/DL (ref 70–99)
GLUCOSE BLD-MCNC: 149 MG/DL (ref 70–99)
GLUCOSE BLD-MCNC: 158 MG/DL (ref 70–99)
GLUCOSE BLD-MCNC: 160 MG/DL (ref 70–99)
HCO3 ARTERIAL: 28.6 MMOL/L (ref 21–29)
HCT VFR BLD CALC: 46.9 % (ref 40.5–52.5)
HEMOGLOBIN, ART, EXTENDED: 16.2 G/DL (ref 13.5–17.5)
HEMOGLOBIN: 15.7 G/DL (ref 13.5–17.5)
LYMPHOCYTES ABSOLUTE: 0.9 K/UL (ref 1–5.1)
LYMPHOCYTES RELATIVE PERCENT: 16.4 %
MCH RBC QN AUTO: 32.3 PG (ref 26–34)
MCHC RBC AUTO-ENTMCNC: 33.4 G/DL (ref 31–36)
MCV RBC AUTO: 96.6 FL (ref 80–100)
METHEMOGLOBIN ARTERIAL: 0.3 %
MONOCYTES ABSOLUTE: 1.1 K/UL (ref 0–1.3)
MONOCYTES RELATIVE PERCENT: 20 %
NEUTROPHILS ABSOLUTE: 3.4 K/UL (ref 1.7–7.7)
NEUTROPHILS RELATIVE PERCENT: 62.7 %
O2 SAT, ARTERIAL: 96.4 %
O2 THERAPY: ABNORMAL
PCO2 ARTERIAL: 40.9 MMHG (ref 35–45)
PDW BLD-RTO: 13.8 % (ref 12.4–15.4)
PERFORMED ON: ABNORMAL
PH ARTERIAL: 7.46 (ref 7.35–7.45)
PLATELET # BLD: 197 K/UL (ref 135–450)
PMV BLD AUTO: 8.7 FL (ref 5–10.5)
PO2 ARTERIAL: 80.5 MMHG (ref 75–108)
POTASSIUM REFLEX MAGNESIUM: 4.4 MMOL/L (ref 3.5–5.1)
PROCALCITONIN: 0.38 NG/ML (ref 0–0.15)
RBC # BLD: 4.85 M/UL (ref 4.2–5.9)
SODIUM BLD-SCNC: 137 MMOL/L (ref 136–145)
TCO2 ARTERIAL: 29.9 MMOL/L
TOTAL PROTEIN: 6.2 G/DL (ref 6.4–8.2)
WBC # BLD: 5.5 K/UL (ref 4–11)

## 2021-09-18 PROCEDURE — C9113 INJ PANTOPRAZOLE SODIUM, VIA: HCPCS | Performed by: INTERNAL MEDICINE

## 2021-09-18 PROCEDURE — 36600 WITHDRAWAL OF ARTERIAL BLOOD: CPT

## 2021-09-18 PROCEDURE — 82803 BLOOD GASES ANY COMBINATION: CPT

## 2021-09-18 PROCEDURE — 6360000002 HC RX W HCPCS: Performed by: INTERNAL MEDICINE

## 2021-09-18 PROCEDURE — 2000000000 HC ICU R&B

## 2021-09-18 PROCEDURE — 85025 COMPLETE CBC W/AUTO DIFF WBC: CPT

## 2021-09-18 PROCEDURE — 71045 X-RAY EXAM CHEST 1 VIEW: CPT

## 2021-09-18 PROCEDURE — 94640 AIRWAY INHALATION TREATMENT: CPT

## 2021-09-18 PROCEDURE — 99233 SBSQ HOSP IP/OBS HIGH 50: CPT | Performed by: INTERNAL MEDICINE

## 2021-09-18 PROCEDURE — 99291 CRITICAL CARE FIRST HOUR: CPT | Performed by: INTERNAL MEDICINE

## 2021-09-18 PROCEDURE — 2500000003 HC RX 250 WO HCPCS: Performed by: INTERNAL MEDICINE

## 2021-09-18 PROCEDURE — 2580000003 HC RX 258: Performed by: INTERNAL MEDICINE

## 2021-09-18 PROCEDURE — 2700000000 HC OXYGEN THERAPY PER DAY

## 2021-09-18 PROCEDURE — 84145 PROCALCITONIN (PCT): CPT

## 2021-09-18 PROCEDURE — 94003 VENT MGMT INPAT SUBQ DAY: CPT

## 2021-09-18 PROCEDURE — 94761 N-INVAS EAR/PLS OXIMETRY MLT: CPT

## 2021-09-18 PROCEDURE — 80053 COMPREHEN METABOLIC PANEL: CPT

## 2021-09-18 PROCEDURE — 36415 COLL VENOUS BLD VENIPUNCTURE: CPT

## 2021-09-18 PROCEDURE — 6370000000 HC RX 637 (ALT 250 FOR IP): Performed by: INTERNAL MEDICINE

## 2021-09-18 RX ADMIN — METOPROLOL TARTRATE 5 MG: 5 INJECTION INTRAVENOUS at 06:11

## 2021-09-18 RX ADMIN — MUPIROCIN: 20 OINTMENT TOPICAL at 09:05

## 2021-09-18 RX ADMIN — FUROSEMIDE 40 MG: 10 INJECTION, SOLUTION INTRAMUSCULAR; INTRAVENOUS at 18:46

## 2021-09-18 RX ADMIN — AMIODARONE HYDROCHLORIDE 0.5 MG/MIN: 50 INJECTION, SOLUTION INTRAVENOUS at 19:49

## 2021-09-18 RX ADMIN — IPRATROPIUM BROMIDE AND ALBUTEROL SULFATE 1 AMPULE: .5; 3 SOLUTION RESPIRATORY (INHALATION) at 08:02

## 2021-09-18 RX ADMIN — CARBOXYMETHYLCELLULOSE SODIUM 1 DROP: 10 GEL OPHTHALMIC at 09:02

## 2021-09-18 RX ADMIN — DILTIAZEM HYDROCHLORIDE 5 MG/HR: 5 INJECTION INTRAVENOUS at 13:25

## 2021-09-18 RX ADMIN — CEFTRIAXONE 2000 MG: 2 INJECTION, POWDER, FOR SOLUTION INTRAMUSCULAR; INTRAVENOUS at 23:25

## 2021-09-18 RX ADMIN — CARBOXYMETHYLCELLULOSE SODIUM 1 DROP: 10 GEL OPHTHALMIC at 05:00

## 2021-09-18 RX ADMIN — METHYLPREDNISOLONE SODIUM SUCCINATE 40 MG: 40 INJECTION, POWDER, FOR SOLUTION INTRAMUSCULAR; INTRAVENOUS at 10:32

## 2021-09-18 RX ADMIN — FOLIC ACID: 5 INJECTION, SOLUTION INTRAMUSCULAR; INTRAVENOUS; SUBCUTANEOUS at 10:26

## 2021-09-18 RX ADMIN — CEFTRIAXONE 2000 MG: 2 INJECTION, POWDER, FOR SOLUTION INTRAMUSCULAR; INTRAVENOUS at 00:20

## 2021-09-18 RX ADMIN — IPRATROPIUM BROMIDE AND ALBUTEROL SULFATE 1 AMPULE: .5; 3 SOLUTION RESPIRATORY (INHALATION) at 19:54

## 2021-09-18 RX ADMIN — IPRATROPIUM BROMIDE AND ALBUTEROL SULFATE 1 AMPULE: .5; 3 SOLUTION RESPIRATORY (INHALATION) at 11:21

## 2021-09-18 RX ADMIN — Medication 0.8 MCG/KG/HR: at 13:57

## 2021-09-18 RX ADMIN — IPRATROPIUM BROMIDE AND ALBUTEROL SULFATE 1 AMPULE: .5; 3 SOLUTION RESPIRATORY (INHALATION) at 23:01

## 2021-09-18 RX ADMIN — FUROSEMIDE 40 MG: 10 INJECTION, SOLUTION INTRAMUSCULAR; INTRAVENOUS at 09:02

## 2021-09-18 RX ADMIN — METHYLPREDNISOLONE SODIUM SUCCINATE 40 MG: 40 INJECTION, POWDER, FOR SOLUTION INTRAMUSCULAR; INTRAVENOUS at 23:20

## 2021-09-18 RX ADMIN — SODIUM CHLORIDE, PRESERVATIVE FREE 10 ML: 5 INJECTION INTRAVENOUS at 09:02

## 2021-09-18 RX ADMIN — MUPIROCIN: 20 OINTMENT TOPICAL at 21:18

## 2021-09-18 RX ADMIN — METOPROLOL TARTRATE 5 MG: 5 INJECTION INTRAVENOUS at 18:46

## 2021-09-18 RX ADMIN — DEXTROSE MONOHYDRATE 500 MG: 50 INJECTION, SOLUTION INTRAVENOUS at 21:32

## 2021-09-18 RX ADMIN — PANTOPRAZOLE SODIUM 40 MG: 40 INJECTION, POWDER, FOR SOLUTION INTRAVENOUS at 09:01

## 2021-09-18 RX ADMIN — CARBOXYMETHYLCELLULOSE SODIUM 1 DROP: 10 GEL OPHTHALMIC at 13:30

## 2021-09-18 RX ADMIN — METOPROLOL TARTRATE 5 MG: 5 INJECTION INTRAVENOUS at 00:30

## 2021-09-18 RX ADMIN — CARBOXYMETHYLCELLULOSE SODIUM 1 DROP: 10 GEL OPHTHALMIC at 00:35

## 2021-09-18 RX ADMIN — WHITE PETROLATUM 57.7 %-MINERAL OIL 31.9 % EYE OINTMENT: at 02:30

## 2021-09-18 RX ADMIN — Medication 0.8 MCG/KG/HR: at 04:42

## 2021-09-18 RX ADMIN — Medication 0.8 MCG/KG/HR: at 09:32

## 2021-09-18 RX ADMIN — AMIODARONE HYDROCHLORIDE 0.5 MG/MIN: 50 INJECTION, SOLUTION INTRAVENOUS at 02:41

## 2021-09-18 RX ADMIN — Medication 0.6 MCG/KG/HR: at 19:33

## 2021-09-18 RX ADMIN — WHITE PETROLATUM 57.7 %-MINERAL OIL 31.9 % EYE OINTMENT: at 10:32

## 2021-09-18 RX ADMIN — CHLORHEXIDINE GLUCONATE 0.12% ORAL RINSE 15 ML: 1.2 LIQUID ORAL at 09:01

## 2021-09-18 RX ADMIN — METOPROLOL TARTRATE 5 MG: 5 INJECTION INTRAVENOUS at 13:17

## 2021-09-18 RX ADMIN — PROPOFOL 20 MCG/KG/MIN: 10 INJECTION, EMULSION INTRAVENOUS at 02:07

## 2021-09-18 RX ADMIN — ENOXAPARIN SODIUM 105 MG: 150 INJECTION SUBCUTANEOUS at 21:19

## 2021-09-18 RX ADMIN — IPRATROPIUM BROMIDE AND ALBUTEROL SULFATE 1 AMPULE: .5; 3 SOLUTION RESPIRATORY (INHALATION) at 03:18

## 2021-09-18 RX ADMIN — SODIUM CHLORIDE, PRESERVATIVE FREE 10 ML: 5 INJECTION INTRAVENOUS at 21:18

## 2021-09-18 RX ADMIN — WHITE PETROLATUM 57.7 %-MINERAL OIL 31.9 % EYE OINTMENT: at 06:11

## 2021-09-18 RX ADMIN — ENOXAPARIN SODIUM 105 MG: 150 INJECTION SUBCUTANEOUS at 09:01

## 2021-09-18 ASSESSMENT — PULMONARY FUNCTION TESTS
PIF_VALUE: 26
PIF_VALUE: 30
PIF_VALUE: 27
PIF_VALUE: 33
PIF_VALUE: 28
PIF_VALUE: 12
PIF_VALUE: 35

## 2021-09-18 ASSESSMENT — PAIN SCALES - GENERAL
PAINLEVEL_OUTOF10: 0

## 2021-09-18 NOTE — PROGRESS NOTES
Pt placed on SBT 5/5, FiO2 30%  RSBI=58  , RR 22         09/18/21 0849   Vent Information   Vt Ordered 480 mL   Rate Set 0 bmp   Peak Flow 70 L/min   Pressure Support 5 cmH20   FiO2  30 %   SpO2 97 %   SpO2/FiO2 ratio 323.33   Sensitivity 2   PEEP/CPAP 5   I Time/ I Time % 0 s   Humidification Source Heated wire   Humidification Temp Measured 37   Vent Patient Data   High Peep/I Pressure 0   Peak Inspiratory Pressure 12 cmH2O   Mean Airway Pressure 6.9 cmH20   Rate Measured 23 br/min   Vt Exhaled 337 mL   Minute Volume 8.23 Liters   I:E Ratio 1:2.20   Spontaneous Breathing Trial (SBT) RT Doc   Pulse 105   Additional Respiratory  Assessments   Resp 26   Alarm Settings   High Pressure Alarm 45 cmH2O   Low Minute Volume Alarm 4 L/min   High Respiratory Rate 40 br/min

## 2021-09-18 NOTE — PROGRESS NOTES
FiO2 decreased to 35% via airvo at 55lpm           09/18/21 1513   Oxygen Therapy/Pulse Ox   O2 Therapy Oxygen humidified   O2 Device Heated high flow cannula   O2 Flow Rate (L/min) 55 L/min   FiO2  35 %   Resp 30   SpO2 100 %

## 2021-09-18 NOTE — PROGRESS NOTES
Reassessment completed - see flowsheet    Pt has been extubated to Airvo by RT. SpO2 99%  Pt alert following commands.

## 2021-09-18 NOTE — PROGRESS NOTES
Pt extubated to airvo at 55lpm, FiO2 40%           09/18/21 1132   Oxygen Therapy/Pulse Ox   O2 Therapy Oxygen humidified   O2 Device Heated high flow cannula   O2 Flow Rate (L/min) 55 L/min   FiO2  40 %   Resp 26   SpO2 97 %

## 2021-09-18 NOTE — PROGRESS NOTES
Cardiology Progress Note     Admit Date: 2021     Reason for follow up: Atrial fibrillation    Interval History:   - Patient seen and examined. - Clinical notes reviewed. - Telemetry reviewed. Atrial fibrillation with elevated rates. - No new complaints today. - Patient extubated. Physical Examination:  Vitals:    21 1000   BP: (!) 135/91   Pulse: 126   Resp: 26   Temp: 99.3 °F (37.4 °C)   SpO2: 100%        Intake/Output Summary (Last 24 hours) at 2021 1131  Last data filed at 2021 1027  Gross per 24 hour   Intake 2086.34 ml   Output 3750 ml   Net -1663.66 ml     In: 1438.9 [I.V.:1134.2]  Out: 2150    Wt Readings from Last 3 Encounters:   21 251 lb 12.3 oz (114.2 kg)     Temp  Av.2 °F (36.8 °C)  Min: 97.6 °F (36.4 °C)  Max: 99.3 °F (37.4 °C)  Pulse  Av.8  Min: 92  Max: 126  BP  Min: 108/78  Max: 138/92  SpO2  Av.3 %  Min: 97 %  Max: 100 %  FiO2   Av %  Min: 30 %  Max: 30 %    · Telemetry: Atrial fibrillation with RVR   · Constitutional: Alert. No distress. · Head: Normocephalic and atraumatic. · Mouth/Throat: Lips appear moist. Oropharynx is clear and moist.  · Eyes: Conjunctivae normal. EOM are normal.   · Neck: Neck supple. No lymphadenopathy. No rigidity. No JVD present. · Cardiovascular: Mildly tachycardic. Irregular rate and rhythm w/o M/G/R  · Pulmonary/Chest: Bilateral respiratory sounds present. No respiratory accessory muscle use. · Abdominal: Soft. Normal bowel sounds present. No distension, No tenderness. · Musculoskeletal: No tenderness. +1 edema    · Lymphadenopathy: Has no cervical adenopathy. · Neurological: Alert and oriented. Cranial nerve II-XII grossly intact. · Skin: Skin is warm and dry. No rash, lesions, ulcerations noted. · Psychiatric: No anxiety nor agitation. Labs, diagnostic and imaging results reviewed. Reviewed.    Recent Labs     21  0418 21  0423 21  0408   * 133* 137   K 5.1 5.3* 4.4   CL 99 100 102   CO2 19* 22 27   BUN 22* 27* 31*   CREATININE 0.8 0.9 0.8     Recent Labs     09/16/21  0418 09/17/21  0423 09/18/21  0408   WBC 8.8 6.7 5.5   HGB 15.1 15.1 15.7   HCT 45.6 45.2 46.9   MCV 98.8 97.4 96.6    181 197     Lab Results   Component Value Date    TROPONINI <0.01 09/14/2021     Estimated Creatinine Clearance: 118 mL/min (based on SCr of 0.8 mg/dL).    No results found for: BNP  No results found for: PROTIME, INR  Lab Results   Component Value Date    TRIG 214 09/17/2021       Scheduled Meds:   metoprolol  5 mg IntraVENous Q6H    furosemide  40 mg IntraVENous BID    insulin lispro  0-12 Units SubCUTAneous Q4H    chlorhexidine  15 mL Mouth/Throat BID    pantoprazole  40 mg IntraVENous Daily    carboxymethylcellulose PF  1 drop Both Eyes Q4H    artificial tears   Both Eyes Q4H    enoxaparin  1 mg/kg SubCUTAneous BID    folic acid, thiamine, multi-vitamin with vitamin K infusion   IntraVENous Daily    mupirocin   Nasal BID    ipratropium-albuterol  1 ampule Inhalation Q4H    cefTRIAXone (ROCEPHIN) IV  2,000 mg IntraVENous Q24H    azithromycin  500 mg IntraVENous Q24H    methylPREDNISolone  40 mg IntraVENous Q12H    nicotine  1 patch TransDERmal Daily    sodium chloride flush  5-40 mL IntraVENous 2 times per day     Continuous Infusions:   dexmedetomidine HCl in NaCl 0.8 mcg/kg/hr (09/18/21 1026)    dextrose      amiodarone 0.5 mg/min (09/18/21 1026)    sodium chloride      propofol Stopped (09/18/21 0807)     PRN Meds:albuterol, perflutren lipid microspheres, midazolam, fentanNYL, levalbuterol, glucose, dextrose, glucagon (rDNA), dextrose, polyethylene glycol, acetaminophen **OR** acetaminophen, guaiFENesin-dextromethorphan, prochlorperazine, sodium chloride flush, sodium chloride, LORazepam **OR** LORazepam **OR** LORazepam **OR** LORazepam **OR** LORazepam **OR** LORazepam **OR** LORazepam **OR** LORazepam     Patient Active Problem List    Diagnosis Date Noted  Atrial fibrillation with rapid ventricular response (HCC)     Acute respiratory failure with hypoxia ([facility] [address] 09/14/2021    Multifocal pneumonia 09/14/2021    Suspected COVID-19 virus infection 09/14/2021    Sepsis ([facility] 09/14/2021    Acute on chronic respiratory failure with hypoxia ([facility] [facility] 09/14/2021    Pneumonia due to COVID-19 virus       Active Hospital Problems    Diagnosis Date Noted    Acute respiratory failure with hypoxia ([facility] [facility] [J96.01] 09/14/2021    Multifocal pneumonia [J18.9] 09/14/2021    Suspected COVID-19 virus infection [Z20.822] 09/14/2021    Sepsis ([facility] [A41.9] 09/14/2021    Acute on chronic respiratory failure with hypoxia [facility]) [J96.21] 09/14/2021     Mr. Jazzy Jacobs is a 61year old male with a medical history significant for COPD who presents from home with acute on chronic respiratory failure secondary to mutlifocal pneumonia whose clinical course has been complicated by atrial fibrillation with RVR and newly diagnosed systolic heart failure. Assessment and Plan:   1. Persistent atrial fibrillation ([id number] score 1-2). Patient is a 61year old male with a medical history significant for newly diagnosed systolic heart failure and likely hypertension who presented from home with multifocal pneumonia. Patient's heart rates borderline elevated on amiodarone. I would suggest starting on diltiazem drip and transitioning amiodarone to PO tomorrow. I agree with [address] once can take PO, would suggest rivaroxaban or apixaban. - Continue amiodarone drip. - Start diltiazem drip. - Discuss [address] once patient improves however start on [address] (rivaroxaban) once can take PO in the interim. - We will continue to follow along with you. 2. Newly diagnosed systolic heart failure. New diagnosis.   No focal wall abnormalities noted on echocardiogram.  He will need ischemic evaluation however heart failure may be result of tachycardia.  - Start GDMT once more stabilized, would start with metoprolol succinate.  - Outpatient ischemic evaluation. If chest pains then may consider while in house. Thank you for allowing me to participate in the care of this patient. If you have any questions, please do not hesitate to contact me.     Alexandria Terry MD  Cardiac Electrophysiology  [address]  [phone] [facility]

## 2021-09-18 NOTE — PROGRESS NOTES
Attempted bedside swallow eval.  Pt still weak not easily able to suck water through straw and had episode of cough.   Pt has been able to tolerate ice chips with no coughing

## 2021-09-18 NOTE — PROGRESS NOTES
09/17/21 2026   Vent Information   Vent Mode AC/VC   Vt Ordered 480 mL   Rate Set 26 bmp   Peak Flow 70 L/min   FiO2  30 %   SpO2 100 %   Sensitivity 3   PEEP/CPAP 5   Vent Patient Data   Peak Inspiratory Pressure 33 cmH2O   Mean Airway Pressure 13 cmH20   Rate Measured 26 br/min   Vt Exhaled 507 mL   Minute Volume 13.2 Liters   I:E Ratio 1:2.10   Plateau Pressure 20 [id number]   Static Compliance 34 mL/cmH2O   Dynamic Compliance 18 mL/cmH2O   Cough/Sputum   Sputum How Obtained Suctioned;Endotracheal   Cough Productive   Sputum Amount Small   Sputum Color Cloudy   Tenacity Thick

## 2021-09-18 NOTE — PROGRESS NOTES
Shift assessment completed, see flow sheet. RASS -2. Intubated and sedated on AC # 8 ETT, at 26 LL. 26 / 480 / 30 %/ +5. SpO2 98%. Respirations are easy, even, and unlabored. Bilateral lung sounds diminished. VSS  AFib on the monitor  OG in place at 63, with TF at on hold due to SBT     /PIV, WNL with propofol held for SBT  Precedex 0.8 mcg/kg/hr  Amiodarone 0.5 mg/min  NaCl 75 ml/hr    All lines and monitoring devices in place. [employer] is patent and secured. Bilateral soft wrist restraints in place for patient safety. Bed in lowest position with wheels locked. No needs expressed at this time. Will continue to monitor.

## 2021-09-18 NOTE — PROGRESS NOTES
Pulmonary & Critical Care Medicine ICU Progress Note    CC: Shortness of breath    Events of Last 24 hours: Failed SBT  Appears to have been negative over the last 24 hours, but intake may not have been accurately recorded    Invasive Lines: Peripheral    MV: 2021  Vent Mode: AC/VC Rate Set: 26 bmp/Vt Ordered: 480 mL/ /FiO2 : 30 %  Recent Labs     21  0540 21  0348   PHART 7.383 7.463*   PNB1SCQ 40.0 40.9   PO2ART 86.1 80.5       IV:   dexmedetomidine HCl in NaCl 0.8 mcg/kg/hr (21 044)    dextrose      amiodarone 0.5 mg/min (21 024)    sodium chloride      propofol 20 mcg/kg/min (21 020)       Vitals:  Blood pressure 122/89, pulse 97, temperature 97.6 °F (36.4 °C), resp. rate 26, height 5' 10\" (1.778 m), weight 251 lb 12.3 oz (114.2 kg), SpO2 99 %. on vent  Temp  Av °F (36.7 °C)  Min: 97.6 °F (36.4 °C)  Max: 98.3 °F (36.8 °C)    Intake/Output Summary (Last 24 hours) at 2021 0754  Last data filed at 2021 0641  Gross per 24 hour   Intake 647.49 ml   Output 3250 ml   Net -2602.51 ml     EXAM:  General: intubated, ill appearing    ENT: Pharynx with ETT. Resp: No crackles. No wheezing  CV: S1, S2. Trace edema  GI: NT, ND, +BS  Skin: Warm and dry. Neuro: PERRL. Awake and following commands.  Patellar reflexes are symmetric     Scheduled Meds:   metoprolol  5 mg IntraVENous Q6H    furosemide  40 mg IntraVENous BID    insulin lispro  0-12 Units SubCUTAneous Q4H    chlorhexidine  15 mL Mouth/Throat BID    pantoprazole  40 mg IntraVENous Daily    carboxymethylcellulose PF  1 drop Both Eyes Q4H    artificial tears   Both Eyes Q4H    enoxaparin  1 mg/kg SubCUTAneous BID    folic acid, thiamine, multi-vitamin with vitamin K infusion   IntraVENous Daily    mupirocin   Nasal BID    ipratropium-albuterol  1 ampule Inhalation Q4H    cefTRIAXone (ROCEPHIN) IV  2,000 mg IntraVENous Q24H    azithromycin  500 mg IntraVENous Q24H    methylPREDNISolone  40 mg IntraVENous Q12H    nicotine  1 patch TransDERmal Daily    sodium chloride flush  5-40 mL IntraVENous 2 times per day     PRN Meds:  albuterol, perflutren lipid microspheres, midazolam, fentanNYL, levalbuterol, glucose, dextrose, glucagon (rDNA), dextrose, polyethylene glycol, acetaminophen **OR** acetaminophen, guaiFENesin-dextromethorphan, prochlorperazine, sodium chloride flush, sodium chloride, LORazepam **OR** LORazepam **OR** LORazepam **OR** LORazepam **OR** LORazepam **OR** LORazepam **OR** LORazepam **OR** LORazepam    Results:  CBC:   Recent Labs     09/16/21 0418 09/17/21 0423 09/18/21  0408   WBC 8.8 6.7 5.5   HGB 15.1 15.1 15.7   HCT 45.6 45.2 46.9   MCV 98.8 97.4 96.6    181 197     BMP:   Recent Labs     09/16/21 0418 09/17/21 0423 09/18/21  0408   * 133* 137   K 5.1 5.3* 4.4   CL 99 100 102   CO2 19* 22 27   BUN 22* 27* 31*   CREATININE 0.8 0.9 0.8     LIVER PROFILE:   Recent Labs     09/16/21 0418 09/17/21 0423 09/18/21  0408   * 74* 45*   ALT 76* 67* 48*   BILITOT 0.3 0.3 0.3   ALKPHOS 90 73 64       Cultures:  9/14/2021 SARS-CoV-2 NAAT and PCR negative  9/14/2021 blood NGTD  9/15/2021 urine antigens are negative  9/15/2021 tracheal aspirate strep pneumonia    Films:  9/14/21 CTPA   Pulmonary Arteries: Pulmonary arteries are adequately opacified for   evaluation.  No evidence of intraluminal filling defect to suggest pulmonary   embolism.  Main pulmonary artery is normal in caliber.       Mediastinum: The central airways are clear.  Right hilar lymphadenopathy   measuring 2.3 x 1.1 cm (2, 135).  The heart and pericardium demonstrate no   acute abnormality.  Atherosclerotic disease of the thoracic aorta.       Lungs/pleura: Irregular right upper lobe nodule measures 2.4 x 1.5 cm (2,   51).   Right lower lobe consolidation and scattered bilateral ground-glass   opacities, consistent with COVID-19 pneumonia.  Panlobular emphysema.  No   pleural effusion or pneumothorax.     Upper Abdomen: Right adrenal gland nodule measuring 1.6 cm.  Left adrenal   gland hyperplasia.  Rest of visualized upper abdomen is unremarkable.       Soft Tissues/Bones: Degenerate disease of the thoracic spine.  No focal   osseous lesion.  Visualized soft tissues are unremarkable.           Impression   No evidence of pulmonary embolism.       COVID-19 pneumonia as described above.       Irregular right upper lobe mass as measured above. CXR 9/17/2021 ET tube okay     ASSESSMENT:  · Acute hypoxic and hypercapnic respiratory failure - failed BiPAP  · Community acquired pneumonia -streptococcal pneumonia  · Hyperkalemia has resolved  · Atrial fibrillation with RVR, rate improved with amiodarone  · 2.4 cm right upper lobe pulmonary nodule, appears concerning for malignancy. He has a remote history of a pulmonary nodule, he thinks sometime in the 90s, he thinks it was on the left side. · Right hilar lymphadenopathy, concerning for malignancy  · Right adrenal nodule 1.6 cm  · Mild transaminitis, h/o alcohol use/abuse   · Hyperglycemia     PLAN:  Mechanical ventilation as per my orders. The ventilator was adjusted by me at the bedside for unstable, life threatening respiratory failure. IV Propofol and Precedex for sedation, target RASS -2, with daily spontaneous awakening trial.  Fentanyl PRN pain, gtt as needed.   SBT, probable trial of extubation to [facility]  CTX/Azithromycin D#5  Lasix BID for 1 more day  IV solumedrol twice daily  IV amio, IV lopressor    Tobacco cessation is recommended   · Will ask radiology to review the adrenal nodule  · Probable outpatient CT PET for suspicious Pulmonary Nodule/hilar lymphadenopathy   · Prophylaxis: On full dose Lovenox due to A. fib, Protonix and Bactroban  · NOK brother (sister-in-law) Marita Bounds [phone]     Total critical care time caring for this patient with life threatening, unstable organ failure, including direct patient contact, management of life support systems, review of data including imaging and labs, discussions with other team members and physicians is 34 minutes so far today, excluding procedures.

## 2021-09-18 NOTE — PROGRESS NOTES
RESPIRATORY THERAPY ASSESSMENT    Name:  Ananya Andrews  Medical Record Number:  [id number]  Age: 61 y.o. Gender: male  : 1955  Today's Date:  2021  Room:  [address] [place] [address]-    Assessment     Is the patient being admitted for a COPD or Asthma exacerbation? No   (If yes the patient will be seen every 4 hours for the first 24 hours and then reassessed)    Patient Admission Diagnosis      Allergies  No Known Allergies    Minimum Predicted Vital Capacity:               Actual Vital Capacity:                    Pulmonary History: Smoker, pulmonary nodule  Home Oxygen Therapy:   Room air  Home Respiratory Therapy: none    Current Respiratory Therapy:  Duoneb Q4, Albuterol prn, Xopenex prn   Treatment Type: Aerosol generator  Medications: Albuterol/Ipratropium    Respiratory Severity Index(RSI)   Patients with orders for inhalation medications, oxygen, or any therapeutic treatment modality will be placed on Respiratory Protocol. They will be assessed with the first treatment and at least every 72 hours thereafter. The following severity scale will be used to determine frequency of treatment intervention. Smoking History: Mild Exacerbation = 3    Social History  Social History     Tobacco Use    Smoking status: Current Every Day Smoker   Substance Use Topics    Alcohol use: Not on file    Drug use: Not on file       Recent Surgical History: None = 0  Past Surgical History  History reviewed. No pertinent surgical history. Level of Consciousness: Comatose = 4    Level of Activity: Bedridden, unresponsive or quadriplegic = 4    Respiratory Pattern: Increased; RR 21-30 = 1    Breath Sounds: Diminshed bilaterally and/or crackles = 2    Sputum  Sputum Color: Creamy, Tenacity:  Thick, Sputum How Obtained: Suctioned, Endotracheal  Cough: Strong, productive = 1    Vital Signs   /71   Pulse 101   Temp 98 °F (36.7 °C)   Resp 26   Ht 5' 10\" (1.778 m)   Wt 251 lb 12.3 oz (114.2 kg)   SpO2 100%   BMI 36.12 kg/m²   SPO2 (COPD values may differ): 86-87% on room air or greater than 92% on FiO2 35- 50% = 3    Peak Flow (asthma only): not applicable = 0    RSI: Greater than 17 = Contact physician        Plan       Goals:  Medication delivery     Patient/caregiver was educated on the proper method of use for Respiratory Care Devices:  Yes      Level of patient/caregiver understanding able to:   ? Verbalize understanding   ? Demonstrate understanding       ? Teach back        ? Needs reinforcement       ? No available caregiver               ? Other:     Response to education:  Good     Is patient being placed on Home Treatment Regimen? No     Does the patient have everything they need prior to discharge? NA     Comments:  Chart reviewed, patient assessed    Plan of Care:  Duoneb Q4, Albuterol prn, Xopenex prn     Electronically signed by Piedad Worley RCP on 9/18/2021 at 12:15 AM    Respiratory Protocol Guidelines     1. Assessment and treatment by Respiratory Therapy will be initiated for medication and therapeutic interventions upon initiation of aerosolized medication. 2. Physician will be contacted for respiratory rate (RR) greater than 35 breaths per minute. Therapy will be held for heart rate (HR) greater than 140 beats per minute, pending direction from physician. 3. Bronchodilators will be administered via Metered Dose Inhaler (MDI) with spacer when the following criteria are met:  a. Alert and cooperative     b. HR < 140 bpm  c. RR < 30 bpm                d. Can demonstrate a 2-3 second inspiratory hold  4. Bronchodilators will be administered via Hand Held Nebulizer CAMMY [facility]) to patients when ANY of the following criteria are met  a. Incognizant or uncooperative          b. Patients treated with HHN at Home        c. Unable to demonstrate proper use of MDI with spacer     d. RR > 30 bpm   5.  Bronchodilators will be delivered via Metered Dose Inhaler (MDI), HHN, Aerogen to intubated patients on mechanical ventilation. 6. Inhalation medication orders will be delivered and/or substituted as outlined below. Aerosolized Medications Ordering and Administration Guidelines:    1. All Medications will be ordered by a physician, and their frequency and/or modality will be adjusted as defined by the patients Respiratory Severity Index (RSI) score. 2. If the patient does not have documented COPD, consider discontinuing anticholinergics when RSI is less than 9.  3. If the bronchospasm worsens (increased RSI), then the bronchodilator frequency can be increased to a maximum of every 4 hours. If greater than every 4 hours is required, the physician will be contacted. 4. If the bronchospasm improves, the frequency of the bronchodilator can be decreased, based on the patient's RSI, but not less than home treatment regimen frequency. 5. Bronchodilator(s) will be discontinued if patient has a RSI less than 9 and has received no scheduled or as needed treatment for 72  Hrs. Patients Ordered on a Mucolytic Agent:    1. Must always be administered with a bronchodilator. 2. Discontinue if patient experiences worsened bronchospasm, or secretions have lessened to the point that the patient is able to clear them with a cough. Anti-inflammatory and Combination Medications:    1. If the patient lacks prior history of lung disease, is not using inhaled anti-inflammatory medication at home, and lacks wheezing by examination or by history for at least 24 hours, contact physician for possible discontinuation.

## 2021-09-18 NOTE — PROGRESS NOTES
End of shift report given to Jonathan Pedroza RN at bedside. Patient alert and oriented. Bed in lowest position with wheels locked. Call light within reach. No further needs at this time.

## 2021-09-18 NOTE — PROGRESS NOTES
Rounding completed with Dr. Serina Chavez and interdisciplinary team.  POC, labs, lines and assessment reviewed.       - SBT started at 0850  -will plan to extubate after rounds to vapotherm, will not need ABG unless pt begins to appear inappropriate and not following commands.  -ok to hold AM dose of humalog  pt NPO

## 2021-09-19 ENCOUNTER — APPOINTMENT (OUTPATIENT)
Dept: GENERAL RADIOLOGY | Age: 66
DRG: 720 | End: 2021-09-19
Payer: COMMERCIAL

## 2021-09-19 LAB
A/G RATIO: 0.9 (ref 1.1–2.2)
ALBUMIN SERPL-MCNC: 3.1 G/DL (ref 3.4–5)
ALP BLD-CCNC: 59 U/L (ref 40–129)
ALT SERPL-CCNC: 51 U/L (ref 10–40)
ANION GAP SERPL CALCULATED.3IONS-SCNC: 12 MMOL/L (ref 3–16)
AST SERPL-CCNC: 46 U/L (ref 15–37)
BASOPHILS ABSOLUTE: 0.2 K/UL (ref 0–0.2)
BASOPHILS RELATIVE PERCENT: 3.2 %
BILIRUB SERPL-MCNC: 0.4 MG/DL (ref 0–1)
BUN BLDV-MCNC: 32 MG/DL (ref 7–20)
CALCIUM SERPL-MCNC: 8.2 MG/DL (ref 8.3–10.6)
CHLORIDE BLD-SCNC: 101 MMOL/L (ref 99–110)
CO2: 29 MMOL/L (ref 21–32)
CREAT SERPL-MCNC: 0.7 MG/DL (ref 0.8–1.3)
CULTURE, BLOOD 2: NORMAL
CULTURE, RESPIRATORY: ABNORMAL
CULTURE, RESPIRATORY: ABNORMAL
EOSINOPHILS ABSOLUTE: 0 K/UL (ref 0–0.6)
EOSINOPHILS RELATIVE PERCENT: 0.2 %
GFR AFRICAN AMERICAN: >60
GFR NON-AFRICAN AMERICAN: >60
GLOBULIN: 3.4 G/DL
GLUCOSE BLD-MCNC: 129 MG/DL (ref 70–99)
GLUCOSE BLD-MCNC: 134 MG/DL (ref 70–99)
GLUCOSE BLD-MCNC: 134 MG/DL (ref 70–99)
GLUCOSE BLD-MCNC: 135 MG/DL (ref 70–99)
GLUCOSE BLD-MCNC: 146 MG/DL (ref 70–99)
GLUCOSE BLD-MCNC: 148 MG/DL (ref 70–99)
GRAM STAIN RESULT: ABNORMAL
HCT VFR BLD CALC: 48.9 % (ref 40.5–52.5)
HEMOGLOBIN: 16 G/DL (ref 13.5–17.5)
LYMPHOCYTES ABSOLUTE: 0.6 K/UL (ref 1–5.1)
LYMPHOCYTES RELATIVE PERCENT: 7.7 %
MCH RBC QN AUTO: 32.1 PG (ref 26–34)
MCHC RBC AUTO-ENTMCNC: 32.8 G/DL (ref 31–36)
MCV RBC AUTO: 97.7 FL (ref 80–100)
MONOCYTES ABSOLUTE: 0.6 K/UL (ref 0–1.3)
MONOCYTES RELATIVE PERCENT: 7.9 %
NEUTROPHILS ABSOLUTE: 5.8 K/UL (ref 1.7–7.7)
NEUTROPHILS RELATIVE PERCENT: 81 %
ORGANISM: ABNORMAL
PDW BLD-RTO: 14.1 % (ref 12.4–15.4)
PERFORMED ON: ABNORMAL
PLATELET # BLD: 198 K/UL (ref 135–450)
PMV BLD AUTO: 8.5 FL (ref 5–10.5)
POTASSIUM REFLEX MAGNESIUM: 3.9 MMOL/L (ref 3.5–5.1)
PROCALCITONIN: 0.23 NG/ML (ref 0–0.15)
RBC # BLD: 5 M/UL (ref 4.2–5.9)
SODIUM BLD-SCNC: 142 MMOL/L (ref 136–145)
TOTAL PROTEIN: 6.5 G/DL (ref 6.4–8.2)
WBC # BLD: 7.2 K/UL (ref 4–11)

## 2021-09-19 PROCEDURE — 36415 COLL VENOUS BLD VENIPUNCTURE: CPT

## 2021-09-19 PROCEDURE — 2500000003 HC RX 250 WO HCPCS: Performed by: INTERNAL MEDICINE

## 2021-09-19 PROCEDURE — 2060000000 HC ICU INTERMEDIATE R&B

## 2021-09-19 PROCEDURE — 99233 SBSQ HOSP IP/OBS HIGH 50: CPT | Performed by: INTERNAL MEDICINE

## 2021-09-19 PROCEDURE — 6370000000 HC RX 637 (ALT 250 FOR IP): Performed by: INTERNAL MEDICINE

## 2021-09-19 PROCEDURE — C9113 INJ PANTOPRAZOLE SODIUM, VIA: HCPCS | Performed by: INTERNAL MEDICINE

## 2021-09-19 PROCEDURE — 2700000000 HC OXYGEN THERAPY PER DAY

## 2021-09-19 PROCEDURE — 6360000002 HC RX W HCPCS: Performed by: INTERNAL MEDICINE

## 2021-09-19 PROCEDURE — 94640 AIRWAY INHALATION TREATMENT: CPT

## 2021-09-19 PROCEDURE — 2580000003 HC RX 258: Performed by: INTERNAL MEDICINE

## 2021-09-19 PROCEDURE — 94761 N-INVAS EAR/PLS OXIMETRY MLT: CPT

## 2021-09-19 PROCEDURE — 71045 X-RAY EXAM CHEST 1 VIEW: CPT

## 2021-09-19 PROCEDURE — 80053 COMPREHEN METABOLIC PANEL: CPT

## 2021-09-19 PROCEDURE — 84145 PROCALCITONIN (PCT): CPT

## 2021-09-19 PROCEDURE — 85025 COMPLETE CBC W/AUTO DIFF WBC: CPT

## 2021-09-19 PROCEDURE — 93005 ELECTROCARDIOGRAM TRACING: CPT | Performed by: INTERNAL MEDICINE

## 2021-09-19 RX ORDER — ALBUTEROL SULFATE 90 UG/1
2 AEROSOL, METERED RESPIRATORY (INHALATION) 4 TIMES DAILY
Status: DISCONTINUED | OUTPATIENT
Start: 2021-09-19 | End: 2021-09-22

## 2021-09-19 RX ORDER — IPRATROPIUM BROMIDE AND ALBUTEROL SULFATE 2.5; .5 MG/3ML; MG/3ML
1 SOLUTION RESPIRATORY (INHALATION) EVERY 4 HOURS PRN
Status: DISCONTINUED | OUTPATIENT
Start: 2021-09-19 | End: 2021-09-23 | Stop reason: HOSPADM

## 2021-09-19 RX ORDER — FUROSEMIDE 10 MG/ML
40 INJECTION INTRAMUSCULAR; INTRAVENOUS ONCE
Status: COMPLETED | OUTPATIENT
Start: 2021-09-19 | End: 2021-09-19

## 2021-09-19 RX ORDER — IPRATROPIUM BROMIDE AND ALBUTEROL SULFATE 2.5; .5 MG/3ML; MG/3ML
1 SOLUTION RESPIRATORY (INHALATION)
Status: DISCONTINUED | OUTPATIENT
Start: 2021-09-19 | End: 2021-09-19

## 2021-09-19 RX ORDER — POTASSIUM CHLORIDE 750 MG/1
20 TABLET, EXTENDED RELEASE ORAL ONCE
Status: COMPLETED | OUTPATIENT
Start: 2021-09-19 | End: 2021-09-19

## 2021-09-19 RX ORDER — AMIODARONE HYDROCHLORIDE 200 MG/1
400 TABLET ORAL DAILY
Status: DISCONTINUED | OUTPATIENT
Start: 2021-09-19 | End: 2021-09-22

## 2021-09-19 RX ORDER — METOPROLOL SUCCINATE 50 MG/1
50 TABLET, EXTENDED RELEASE ORAL 2 TIMES DAILY
Status: DISCONTINUED | OUTPATIENT
Start: 2021-09-19 | End: 2021-09-21

## 2021-09-19 RX ADMIN — AMIODARONE HYDROCHLORIDE 0.5 MG/MIN: 50 INJECTION, SOLUTION INTRAVENOUS at 09:39

## 2021-09-19 RX ADMIN — METOPROLOL TARTRATE 5 MG: 5 INJECTION INTRAVENOUS at 06:08

## 2021-09-19 RX ADMIN — POTASSIUM CHLORIDE 20 MEQ: 750 TABLET, EXTENDED RELEASE ORAL at 11:53

## 2021-09-19 RX ADMIN — ENOXAPARIN SODIUM 105 MG: 150 INJECTION SUBCUTANEOUS at 09:24

## 2021-09-19 RX ADMIN — Medication 0.4 MCG/KG/HR: at 02:11

## 2021-09-19 RX ADMIN — CEFTRIAXONE 2000 MG: 2 INJECTION, POWDER, FOR SOLUTION INTRAMUSCULAR; INTRAVENOUS at 21:46

## 2021-09-19 RX ADMIN — FUROSEMIDE 40 MG: 10 INJECTION, SOLUTION INTRAMUSCULAR; INTRAVENOUS at 09:23

## 2021-09-19 RX ADMIN — AMIODARONE HYDROCHLORIDE 400 MG: 200 TABLET ORAL at 11:52

## 2021-09-19 RX ADMIN — METOPROLOL SUCCINATE 50 MG: 50 TABLET, EXTENDED RELEASE ORAL at 21:39

## 2021-09-19 RX ADMIN — SODIUM CHLORIDE, PRESERVATIVE FREE 10 ML: 5 INJECTION INTRAVENOUS at 09:25

## 2021-09-19 RX ADMIN — METHYLPREDNISOLONE SODIUM SUCCINATE 40 MG: 40 INJECTION, POWDER, FOR SOLUTION INTRAMUSCULAR; INTRAVENOUS at 11:53

## 2021-09-19 RX ADMIN — METHYLPREDNISOLONE SODIUM SUCCINATE 40 MG: 40 INJECTION, POWDER, FOR SOLUTION INTRAMUSCULAR; INTRAVENOUS at 21:37

## 2021-09-19 RX ADMIN — Medication 2 PUFF: at 14:55

## 2021-09-19 RX ADMIN — METOPROLOL SUCCINATE 50 MG: 50 TABLET, EXTENDED RELEASE ORAL at 11:51

## 2021-09-19 RX ADMIN — DILTIAZEM HYDROCHLORIDE 7.5 MG/HR: 5 INJECTION INTRAVENOUS at 12:12

## 2021-09-19 RX ADMIN — MUPIROCIN: 20 OINTMENT TOPICAL at 09:44

## 2021-09-19 RX ADMIN — PANTOPRAZOLE SODIUM 40 MG: 40 INJECTION, POWDER, FOR SOLUTION INTRAVENOUS at 09:24

## 2021-09-19 RX ADMIN — Medication 2 PUFF: at 19:20

## 2021-09-19 RX ADMIN — ENOXAPARIN SODIUM 105 MG: 150 INJECTION SUBCUTANEOUS at 21:38

## 2021-09-19 ASSESSMENT — PAIN SCALES - GENERAL
PAINLEVEL_OUTOF10: 0

## 2021-09-19 NOTE — PROGRESS NOTES
Rounding completed with Dr. Gracie Banuelos and interdisciplinary team.  POC, labs, lines and assessment reviewed.       - okay to DC vitamin bag see MAR  -turn off precedex  -repeat bedside swallow eval place diet as appropriate and cancel SLP  -potassium 20 meq PO if tolerates swallow eval

## 2021-09-19 NOTE — PROGRESS NOTES
Spoke with cardiologist per hospitalist request due to increase need in diltiazem drip. Pt started shift at 2.5 mg/hr. HR 80's  Pt has HR sustaining on monitor . Diltiazem increased to 12.5 mg/hr currently. -170. Pt to be transferred to PCU. Per Dr. Esther Concepcion continue to titrate drip as indicated. If not able to titrate on other unit would advise stay in unit.

## 2021-09-19 NOTE — PROGRESS NOTES
Hospitalist Progress Note      PCP: No primary care provider on file. Date of Admission: 9/14/2021    Subjective: extubated today, still feeling groggy, requesting ice chips    Medications:  Reviewed    Infusion Medications    dilTIAZem (CARDIZEM) 125 mg in dextrose 5% 125 mL infusion 7.5 mg/hr (09/19/21 1212)    dexmedetomidine HCl in NaCl Stopped (09/19/21 1004)    dextrose      sodium chloride       Scheduled Medications    amiodarone  400 mg Oral Daily    metoprolol succinate  50 mg Oral BID    albuterol sulfate HFA  2 puff Inhalation 4x daily    insulin lispro  0-12 Units SubCUTAneous Q4H    pantoprazole  40 mg IntraVENous Daily    enoxaparin  1 mg/kg SubCUTAneous BID    mupirocin   Nasal BID    cefTRIAXone (ROCEPHIN) IV  2,000 mg IntraVENous Q24H    methylPREDNISolone  40 mg IntraVENous Q12H    nicotine  1 patch TransDERmal Daily    sodium chloride flush  5-40 mL IntraVENous 2 times per day     PRN Meds: ipratropium-albuterol, perflutren lipid microspheres, glucose, dextrose, glucagon (rDNA), dextrose, polyethylene glycol, acetaminophen **OR** acetaminophen, guaiFENesin-dextromethorphan, prochlorperazine, sodium chloride flush, sodium chloride, LORazepam **OR** LORazepam **OR** LORazepam **OR** LORazepam **OR** LORazepam **OR** LORazepam **OR** LORazepam **OR** LORazepam      Intake/Output Summary (Last 24 hours) at 9/19/2021 1432  Last data filed at 9/19/2021 1207  Gross per 24 hour   Intake 1335.07 ml   Output 5125 ml   Net -3789.93 ml       Physical Exam Performed:    BP (!) 156/91   Pulse 102   Temp 96.8 °F (36 °C) (Oral)   Resp 21   Ht 5' 10\" (1.778 m)   Wt 234 lb 12.6 oz (106.5 kg)   SpO2 97%   BMI 33.69 kg/m²     GEN: extubated, sleepy  HEENT: NC/AT,EOMI, semi dry MM, no erythema/exudates or visible masses. CVS: Normal S1,S2.  Tachy RRR. Without M/G/R.   LUNG: Bilat wheezes and course central BS.  Tachypnea, diminished/tight bilat.   ABD: Soft, ND/NT, BS+ x4.  Without upper lung zone. Recommend   dedicated noncontrast CT for further evaluation and to exclude a nodule. 2. Patchy right-sided pulmonary opacities are noted, which could represent   multifocal pneumonia, atelectasis or atypical edema. 3. Small bilateral pleural fluid.                  Assessment/Plan:    Active Hospital Problems    Diagnosis     Acute respiratory failure with hypoxia (HCC) [J96.01]     Multifocal pneumonia [J18.9]     Suspected COVID-19 virus infection [Z20.822]     Sepsis ([facility] [address] [A41.9]     Acute on chronic respiratory failure with hypoxia (HCC) [J96.21]        Acute respiratory failure with hypoxia and hypercapnia  - likely related to #3  - Admitted to ICU, tele  - supplemental O2 to maintain SPO2 ? 92%, cont pulse ox  - Wean as tolerated.  Started on BiPAP per my orders.  ABG's/CXRs ordered  - pt intubated on 9/14  - Intensivist c/s.   - extubated 9/18     Sepsis  - 2/2 PNA  -  ABX as below  - No need for pressors at this time  -  F/u blood, resp cx     Multifocal PNA  - (CAP - likely GP organism) w/ concern for underlying chronic lung path (COPD, asthma, etc); PCT 1.02  -  IV solumedrol, IV azithro/ceftriaxone day #3  - PRN/[facility] intensive NEB therapy.  Check respiratory culture; urine legionella and strep pnuemo - neg  - Pulm consulted.  Hold home regimen for now.      Atrial Fibrillation with RVR  - unknown hx; TSH normal  - full dose lovenox  -On amiodarone drip.  - started cardizem gtt on 9/18  - cardiology following -   Echocardiogram ordered     Hyponatremia  - 127 on arrival  - IVF and repeat  Resolving     Hyperglycemia  - likely 2/2 steroids  - low dose SSI, check Hgb A1c  - PRN hypoglycemia protocol in place     Transaminitis  - slightly worsened from admission  - no priors available for comparison  - monitor LFTs     Irregular RUL mass  - 2.4 cm in greatest dimension.  Hilar LAD  - Will need OP follow up/imaging in 3 mos.  No PCP per Epic.       Prolonged QTc  - 474 ms, avoid QT prolonging agents as able  - tele     Tobacco Abuse  - counseled cessation  - 14 mg nicotine patch.        DVT Prophylaxis: Lovenox  Diet: ADULT DIET;  Regular  Code Status: Full Code    PT/OT Eval Status: ordered    Dispo - cont care in ICU, SLP eval ordered post extubation    Susana White MD

## 2021-09-19 NOTE — PROGRESS NOTES
RESPIRATORY THERAPY ASSESSMENT    Name:  Avinash Dawson  Medical Record Number:  [id number]  Age: 61 y.o. Gender: male  : 1955  Today's Date:  2021  Room:  [address] [place] [address]    Assessment     Is the patient being admitted for a COPD or Asthma exacerbation? Yes   (If yes the patient will be seen every 4 hours for the first 24 hours and then reassessed)    Patient Admission Diagnosis      Allergies  No Known Allergies    Minimum Predicted Vital Capacity:               Actual Vital Capacity:                    Pulmonary History:No history  Home Oxygen Therapy:  room air  Home Respiratory Therapy:Albuterol prn   Current Respiratory Therapy:  duoneb Q4wa, Albuterol Q4prn, Xopenex Q2prn  Treatment Type: HHN  Medications: Albuterol/Ipratropium    Respiratory Severity Index(RSI)   Patients with orders for inhalation medications, oxygen, or any therapeutic treatment modality will be placed on Respiratory Protocol. They will be assessed with the first treatment and at least every 72 hours thereafter. The following severity scale will be used to determine frequency of treatment intervention. Smoking History: Pulmonary Disease or Smoking History, Greater than 15 pack year = 2    Social History  Social History     Tobacco Use    Smoking status: Current Every Day Smoker   Substance Use Topics    Alcohol use: Not on file    Drug use: Not on file       Recent Surgical History: None = 0  Past Surgical History  History reviewed. No pertinent surgical history. Level of Consciousness: Alert, Oriented, and Cooperative = 0    Level of Activity: Bedridden, unresponsive or quadriplegic = 4    Respiratory Pattern: Regular Pattern; RR 8-20 = 0    Breath Sounds: Diminshed bilaterally and/or crackles = 2    Sputum  Sputum Color: Creamy, Tenacity:  Thick, Sputum How Obtained: Endotracheal  Cough: Strong, spontaneous, non-productive = 0    Vital Signs   BP (!) 141/66   Pulse 93   Temp 96.8 °F (36 °C) (Oral)   Resp 20  5' 10\" (1.778 m)   Wt 234 lb 12.6 oz (106.5 kg)   SpO2 97%   BMI 33.69 kg/m²   SPO2 (COPD values may differ): 90-91% on room air or greater than 92% on FiO2 24- 28% = 1    Peak Flow (asthma only): not applicable = 0    RSI: 1-99 = TID (three times daily) and Q4hr PRN for dyspnea        Plan       Goals: medication delivery    Patient/caregiver was educated on the proper method of use for Respiratory Care Devices:  Yes      Level of patient/caregiver understanding able to:   ? Verbalize understanding   ? Demonstrate understanding       ? Teach back        ? Needs reinforcement       ? No available caregiver               ? Other:     Response to education:  good     Is patient being placed on Home Treatment Regimen? No     Does the patient have everything they need prior to discharge? Yes     Comments: chart reviewed, pt assessed    Plan of Care: albuterol mdi QID and Q4prn    Electronically signed by Colleen Boykin RCP on 9/19/2021 at 1:23 PM    Respiratory Protocol Guidelines     1. Assessment and treatment by Respiratory Therapy will be initiated for medication and therapeutic interventions upon initiation of aerosolized medication. 2. Physician will be contacted for respiratory rate (RR) greater than 35 breaths per minute. Therapy will be held for heart rate (HR) greater than 140 beats per minute, pending direction from physician. 3. Bronchodilators will be administered via Metered Dose Inhaler (MDI) with spacer when the following criteria are met:  a. Alert and cooperative     b. HR < 140 bpm  c. RR < 30 bpm                d. Can demonstrate a 2-3 second inspiratory hold  4. Bronchodilators will be administered via Hand Held Nebulizer CAM[facility]) to patients when ANY of the following criteria are met  a. Incognizant or uncooperative          b. Patients treated with HHN at Home        c. Unable to demonstrate proper use of MDI with spacer     d. RR > 30 bpm   5.  Bronchodilators will be delivered via Metered Dose Inhaler (MDI), HHN, Aerogen to intubated patients on mechanical ventilation. 6. Inhalation medication orders will be delivered and/or substituted as outlined below. Aerosolized Medications Ordering and Administration Guidelines:    1. All Medications will be ordered by a physician, and their frequency and/or modality will be adjusted as defined by the patients Respiratory Severity Index (RSI) score. 2. If the patient does not have documented COPD, consider discontinuing anticholinergics when RSI is less than 9.  3. If the bronchospasm worsens (increased RSI), then the bronchodilator frequency can be increased to a maximum of every 4 hours. If greater than every 4 hours is required, the physician will be contacted. 4. If the bronchospasm improves, the frequency of the bronchodilator can be decreased, based on the patient's RSI, but not less than home treatment regimen frequency. 5. Bronchodilator(s) will be discontinued if patient has a RSI less than 9 and has received no scheduled or as needed treatment for 72  Hrs. Patients Ordered on a Mucolytic Agent:    1. Must always be administered with a bronchodilator. 2. Discontinue if patient experiences worsened bronchospasm, or secretions have lessened to the point that the patient is able to clear them with a cough. Anti-inflammatory and Combination Medications:    1. If the patient lacks prior history of lung disease, is not using inhaled anti-inflammatory medication at home, and lacks wheezing by examination or by history for at least 24 hours, contact physician for possible discontinuation.

## 2021-09-19 NOTE — PROGRESS NOTES
Cardiology Progress Note     Admit Date: 2021     Reason for follow up: Atrial fibrillation    Interval History:   - Patient seen and examined. - Clinical notes reviewed. - Telemetry reviewed. Irregular rate and rhythm however may see some P waves. Wondering if MAT now.    - No new complaints today. - Patient did well post extubation. Physical Examination:  Vitals:    21 1000   BP: (!) 150/85   Pulse: 98   Resp: 23   Temp:    SpO2: 95%        Intake/Output Summary (Last 24 hours) at 2021 1035  Last data filed at 2021 0809  Gross per 24 hour   Intake 1236.65 ml   Output 4000 ml   Net -2763.35 ml     In: 958.7 [P.O.:25; I.V.:633.7]  Out: 3300    Wt Readings from Last 3 Encounters:   21 234 lb 12.6 oz (106.5 kg)     Temp  Av.2 °F (36.2 °C)  Min: 96.5 °F (35.8 °C)  Max: 99.2 °F (37.3 °C)  Pulse  Av.4  Min: 78  Max: 119  BP  Min: 119/68  Max: 150/85  SpO2  Av.1 %  Min: 86 %  Max: 100 %  FiO2   Av.8 %  Min: 30 %  Max: 40 %    · Telemetry: Atrial fibrillation with RVR   · Constitutional: Alert. No distress. · Head: Normocephalic and atraumatic. · Mouth/Throat: Lips appear moist. Oropharynx is clear and moist.   · Cardiovascular: Mildly tachycardic. Irregular rate and rhythm w/o M/G/R  · Pulmonary/Chest: Bilateral respiratory sounds present. No respiratory accessory muscle use. · Abdominal: Soft. Normal bowel sounds present. No distension, No tenderness. · Musculoskeletal: No tenderness. +1 edema    · Lymphadenopathy: Has no cervical adenopathy. · Psychiatric: No anxiety nor agitation. Labs, diagnostic and imaging results reviewed. Reviewed.    Recent Labs     21  0423 21  0408 21  0409   * 137 142   K 5.3* 4.4 3.9    102 101   CO2 22 27 29   BUN 27* 31* 32*   CREATININE 0.9 0.8 0.7*     Recent Labs     21  0423 21  0408 21  0409   WBC 6.7 5.5 7.2   HGB 15.1 15.7 16.0   HCT 45.2 46.9 48.9   MCV 97.4 96.6 97.7    197 198     Lab Results   Component Value Date    TROPONINI <0.01 09/14/2021     Estimated Creatinine Clearance: 130 mL/min (A) (based on SCr of 0.7 mg/dL (L)).    No results found for: BNP  No results found for: PROTIME, INR  Lab Results   Component Value Date    TRIG 214 09/17/2021       Scheduled Meds:   ipratropium-albuterol  1 ampule Inhalation Q4H While awake    metoprolol  5 mg IntraVENous Q6H    insulin lispro  0-12 Units SubCUTAneous Q4H    pantoprazole  40 mg IntraVENous Daily    enoxaparin  1 mg/kg SubCUTAneous BID    mupirocin   Nasal BID    cefTRIAXone (ROCEPHIN) IV  2,000 mg IntraVENous Q24H    methylPREDNISolone  40 mg IntraVENous Q12H    nicotine  1 patch TransDERmal Daily    sodium chloride flush  5-40 mL IntraVENous 2 times per day     Continuous Infusions:   dilTIAZem (CARDIZEM) 125 mg in dextrose 5% 125 mL infusion 2.5 mg/hr (09/19/21 0809)    dexmedetomidine HCl in NaCl 0.1 mcg/kg/hr (09/19/21 0809)    dextrose      amiodarone 0.5 mg/min (09/19/21 0939)    sodium chloride       PRN Meds:albuterol, perflutren lipid microspheres, levalbuterol, glucose, dextrose, glucagon (rDNA), dextrose, polyethylene glycol, acetaminophen **OR** acetaminophen, guaiFENesin-dextromethorphan, prochlorperazine, sodium chloride flush, sodium chloride, LORazepam **OR** LORazepam **OR** LORazepam **OR** LORazepam **OR** LORazepam **OR** LORazepam **OR** LORazepam **OR** LORazepam     Patient Active Problem List    Diagnosis Date Noted    Atrial fibrillation with rapid ventricular response (HCC)     [facility] 09/14/2021    Multifocal pneumonia 09/14/2021    Suspected COVID-19 virus infection 09/14/2021    Sepsis ([facility] 09/14/2021    Acute on chronic respiratory failure with hypoxia ([facility] 09/14/2021    Pneumonia due to COVID-19 virus       Active Hospital Problems    Diagnosis Date Noted    Acute respiratory failure with hypoxia ([facility] [J96.01] focal wall abnormalities noted on echocardiogram.  He will need ischemic evaluation however heart failure may be result of tachycardia.  - Start GDMT once more stabilized, would start with metoprolol succinate.  - Outpatient ischemic evaluation. If chest pains then may consider while in house. 09/19/2021  Stable. Doesn' appear grossly volume overloaded. Agree with gentle diuresis. - Continue howie diuresis. - Plan as per above. - Start ACE-I/ARB tomorrow. 3. Acute respiratory failure secondary to pneumonia and heart failure. - Plan as per above and per primary team.    Thank you for allowing me to participate in the care of this patient. If you have any questions, please do not hesitate to contact me.     Dilip Cesar MD  Cardiac Electrophysiology  [address]  [phone] [facility]

## 2021-09-19 NOTE — PROGRESS NOTES
Pt A/O, following commands. VSS. Assessment complete as charted. Repositioned for comfort. Call light in reach. Denies needs. Will continue to monitor. Pt on Air Vo 30 Lt and FiO2 35% 93-98% saturation. Pt able to have Ice chips with out coughing. Updated pt's family. Cardizem infusing @ 2.5 mg/hr. Precedex infusing @ 0.5 mcg/hr. Amiodarone infusing @ 0.5 mg/min. A fib on the monitor HR 80's.

## 2021-09-19 NOTE — PROGRESS NOTES
RESPIRATORY THERAPY ASSESSMENT    Name:  Nataly rFanz  Medical Record Number:  [id number]  Age: 61 y.o. Gender: male  : 1955  Today's Date:  2021  Room:  [address] [place] [address]-    Assessment     Is the patient being admitted for a COPD or Asthma exacerbation? No   (If yes the patient will be seen every 4 hours for the first 24 hours and then reassessed)    Patient Admission Diagnosis      Allergies  No Known Allergies    Minimum Predicted Vital Capacity:               Actual Vital Capacity:                    Pulmonary History: Smoker, Pulmonary nodule  Home Oxygen Therapy:   Room air  Home Respiratory Therapy: none   Current Respiratory Therapy:   DUoneb Q4, Albuterol prn, Xopenex prn   Treatment Type: HHN  Medications: Albuterol/Ipratropium    Respiratory Severity Index(RSI)   Patients with orders for inhalation medications, oxygen, or any therapeutic treatment modality will be placed on Respiratory Protocol. They will be assessed with the first treatment and at least every 72 hours thereafter. The following severity scale will be used to determine frequency of treatment intervention. Smoking History: Mild Exacerbation = 3    Social History  Social History     Tobacco Use    Smoking status: Current Every Day Smoker   Substance Use Topics    Alcohol use: Not on file    Drug use: Not on file       Recent Surgical History: None = 0  Past Surgical History  History reviewed. No pertinent surgical history. Level of Consciousness: Alert, Oriented, and Cooperative = 0    Level of Activity: Bedridden, unresponsive or quadriplegic = 4    Respiratory Pattern: Regular Pattern; RR 8-20 = 0    Breath Sounds: Diminshed bilaterally and/or crackles = 2    Sputum  Sputum Color: Creamy, Tenacity:  Thick, Sputum How Obtained: Endotracheal  Cough: Strong, spontaneous, non-productive = 0    Vital Signs   /85   Pulse 80   Temp 96.5 °F (35.8 °C) (Axillary)   Resp 21   Ht 5' 10\" (1.778 m)   Wt 251 lb 12.3 oz to intubated patients on mechanical ventilation. 6. Inhalation medication orders will be delivered and/or substituted as outlined below. Aerosolized Medications Ordering and Administration Guidelines:    1. All Medications will be ordered by a physician, and their frequency and/or modality will be adjusted as defined by the patients Respiratory Severity Index (RSI) score. 2. If the patient does not have documented COPD, consider discontinuing anticholinergics when RSI is less than 9.  3. If the bronchospasm worsens (increased RSI), then the bronchodilator frequency can be increased to a maximum of every 4 hours. If greater than every 4 hours is required, the physician will be contacted. 4. If the bronchospasm improves, the frequency of the bronchodilator can be decreased, based on the patient's RSI, but not less than home treatment regimen frequency. 5. Bronchodilator(s) will be discontinued if patient has a RSI less than 9 and has received no scheduled or as needed treatment for 72  Hrs. Patients Ordered on a Mucolytic Agent:    1. Must always be administered with a bronchodilator. 2. Discontinue if patient experiences worsened bronchospasm, or secretions have lessened to the point that the patient is able to clear them with a cough. Anti-inflammatory and Combination Medications:    1. If the patient lacks prior history of lung disease, is not using inhaled anti-inflammatory medication at home, and lacks wheezing by examination or by history for at least 24 hours, contact physician for possible discontinuation.

## 2021-09-19 NOTE — PROGRESS NOTES
Report given to [facility] RN care transferred at this time. Patient has been provided beverage but denies any other needs at this time.

## 2021-09-19 NOTE — PROGRESS NOTES
End of shift report given to Raj Shea RN at bedside. Patient alert and oriented. Bed in lowest position with wheels locked. Call light within reach. No further needs at this time.

## 2021-09-19 NOTE — PROGRESS NOTES
Pulmonary & Critical Care Medicine ICU Progress Note    CC: Shortness of breath    Events of Last 24 hours:   Extubated to air HFNO   Seen by EP and started on diltiazem  I's and O's are -2 L yesterday, +3.5 this admission    Invasive Lines: Peripheral    MV: 2021-2021, extubated to [facility]   Vent Mode: AC/VC Rate Set: 0 bmp/Vt Ordered: 480 mL/ /FiO2 : 30 %  Recent Labs     21  0540 21  0348   PHART 7.383 7.463*   [id number] 40.0 40.9   PO2ART 86.1 80.5       IV:   dilTIAZem (CARDIZEM) 125 mg in dextrose 5% 125 mL infusion 2.5 mg/hr (21 0018)    dexmedetomidine HCl in NaCl 0.1 mcg/kg/hr (21)    dextrose      amiodarone 0.5 mg/min (21)    sodium chloride         Vitals:  Blood pressure 132/86, pulse 82, temperature 96.9 °F (36.1 °C), temperature source Axillary, resp. rate 17, height 5' 10\" (1.778 m), weight 234 lb 12.6 oz (106.5 kg), SpO2 100 %. on HFNO  Temp  Av.7 °F (36.5 °C)  Min: 96.5 °F (35.8 °C)  Max: 99.3 °F (37.4 °C)    Intake/Output Summary (Last 24 hours) at 2021 0759  Last data filed at 2021 0344  Gross per 24 hour   Intake 2562.22 ml   Output 4425 ml   Net -1862.78 ml     EXAM:  General: Looks better, NAD  Eyes: PERRL. No sclera icterus. No conjunctival injection. ENT: No discharge. Pharynx clear. Neck: Trachea midline. Normal thyroid. Resp: No accessory muscle use. No crackles. No wheezing. No rhonchi. No dullness on percussion. Markedly diminished bilaterally  CV: Regular rate. Irregular rhythm. No mumur or rub. No edema. Peripheral pulses are 2+. Capillary refill is less than 3 seconds. GI: Non-tender. Non-distended. No masses. No organomegaly. Normal bowel sounds. No hernia. Skin: Warm and dry. No nodule on exposed extremities. No rash on exposed extremities. Lymph: No cervical LAD. No supraclavicular LAD. M/S: No cyanosis. No joint deformity. No clubbing. Neuro: Alert and oriented to person and events, speech is fluent. Patellar reflexes are symmetric. Psych: No agitation, no anxiety, affect is full.      Scheduled Meds:   ipratropium-albuterol  1 ampule Inhalation Q4H While awake    metoprolol  5 mg IntraVENous Q6H    insulin lispro  0-12 Units SubCUTAneous Q4H    pantoprazole  40 mg IntraVENous Daily    enoxaparin  1 mg/kg SubCUTAneous BID    folic acid, thiamine, multi-vitamin with vitamin K infusion   IntraVENous Daily    mupirocin   Nasal BID    cefTRIAXone (ROCEPHIN) IV  2,000 mg IntraVENous Q24H    methylPREDNISolone  40 mg IntraVENous Q12H    nicotine  1 patch TransDERmal Daily    sodium chloride flush  5-40 mL IntraVENous 2 times per day     PRN Meds:  albuterol, perflutren lipid microspheres, levalbuterol, glucose, dextrose, glucagon (rDNA), dextrose, polyethylene glycol, acetaminophen **OR** acetaminophen, guaiFENesin-dextromethorphan, prochlorperazine, sodium chloride flush, sodium chloride, LORazepam **OR** LORazepam **OR** LORazepam **OR** LORazepam **OR** LORazepam **OR** LORazepam **OR** LORazepam **OR** LORazepam    Results:  CBC:   Recent Labs     09/17/21 0423 09/18/21  0408 09/19/21  0409   WBC 6.7 5.5 7.2   HGB 15.1 15.7 16.0   HCT 45.2 46.9 48.9   MCV 97.4 96.6 97.7    197 198     BMP:   Recent Labs     09/17/21  0423 09/18/21  0408 09/19/21  0409   * 137 142   K 5.3* 4.4 3.9    102 101   CO2 22 27 29   BUN 27* 31* 32*   CREATININE 0.9 0.8 0.7*     LIVER PROFILE:   Recent Labs     09/17/21  0423 09/18/21  0408 09/19/21  0409   AST 74* 45* 46*   ALT 67* 48* 51*   BILITOT 0.3 0.3 0.4   ALKPHOS 73 64 59       Cultures:  9/14/2021 SARS-CoV-2 NAAT and PCR negative  9/14/2021 blood NGTD  9/15/2021 urine antigens are negative  9/15/2021 tracheal aspirate strep pneumonia    Films:  9/14/21 CTPA   Pulmonary Arteries: Pulmonary arteries are adequately opacified for   evaluation.  No evidence of intraluminal filling defect to suggest pulmonary   embolism.  Main pulmonary artery is normal in caliber.       Mediastinum: The central airways are clear.  Right hilar lymphadenopathy   measuring 2.3 x 1.1 cm (2, 135).  The heart and pericardium demonstrate no   acute abnormality.  Atherosclerotic disease of the thoracic aorta.       Lungs/pleura: Irregular right upper lobe nodule measures 2.4 x 1.5 cm (2,   51).  Right lower lobe consolidation and scattered bilateral ground-glass   opacities, consistent with COVID-19 pneumonia.  Panlobular emphysema.  No   pleural effusion or pneumothorax.       Upper Abdomen: Right adrenal gland nodule measuring 1.6 cm.  Left adrenal   gland hyperplasia.  Rest of visualized upper abdomen is unremarkable.       Soft Tissues/Bones: Degenerate disease of the thoracic spine.  No focal   osseous lesion.  Visualized soft tissues are unremarkable.           Impression   No evidence of pulmonary embolism.       COVID-19 pneumonia as described above.       Irregular right upper lobe mass as measured above. CXR 9/18/2021 ET tube okay     ASSESSMENT:  · Acute hypoxic and hypercapnic respiratory failure - failed BiPAP  · Community acquired pneumonia -streptococcal pneumonia  · Hyperkalemia has resolved  · Atrial fibrillation with RVR, rate improved with amiodarone  · 2.4 cm right upper lobe pulmonary nodule, appears concerning for malignancy. He has a remote history of a pulmonary nodule, he thinks sometime in the 90s, he thinks it was on the left side.   · Right hilar lymphadenopathy, concerning for malignancy  · Right adrenal nodule 1.6 cm, not characterized by reading radiologist  · Mild transaminitis, h/o alcohol use/abuse   · Hyperglycemia     PLAN:  HHHFNO, wean to NC.    CTX day #6, received 5 days Azithromycin   Lasix BID yesterday, decrease to X 1 today, goal slightly negative   IV solumedrol twice daily  IV amio, IV diltiazem, IV lopressor  per EP  Tobacco cessation is recommended   · Probable outpatient CT PET for suspicious Pulmonary Nodule/hilar lymphadenopathy · Prophylaxis: On full dose Lovenox due to A. fib, Protonix and Bactroban  · MAXINE brother (sister-in-law) [address] [phone]   · Probably okay for PCU today     Total critical care time caring for this patient with life threatening, unstable organ failure, including direct patient contact, management of life support systems, review of data including imaging and labs, discussions with other team members and physicians is 34 minutes so far today, excluding procedures.

## 2021-09-19 NOTE — PROGRESS NOTES
Pt given applesauce and water to test swallow eval.  Pt tolerated both well no coughing and able to clear throat appropriately.

## 2021-09-20 LAB
A/G RATIO: 1 (ref 1.1–2.2)
ALBUMIN SERPL-MCNC: 3.4 G/DL (ref 3.4–5)
ALP BLD-CCNC: 63 U/L (ref 40–129)
ALT SERPL-CCNC: 68 U/L (ref 10–40)
ANION GAP SERPL CALCULATED.3IONS-SCNC: 11 MMOL/L (ref 3–16)
AST SERPL-CCNC: 57 U/L (ref 15–37)
BASOPHILS ABSOLUTE: 0 K/UL (ref 0–0.2)
BASOPHILS RELATIVE PERCENT: 0.1 %
BILIRUB SERPL-MCNC: 0.7 MG/DL (ref 0–1)
BUN BLDV-MCNC: 27 MG/DL (ref 7–20)
CALCIUM SERPL-MCNC: 8.6 MG/DL (ref 8.3–10.6)
CHLORIDE BLD-SCNC: 100 MMOL/L (ref 99–110)
CO2: 31 MMOL/L (ref 21–32)
CREAT SERPL-MCNC: 0.6 MG/DL (ref 0.8–1.3)
EKG ATRIAL RATE: 100 BPM
EKG DIAGNOSIS: NORMAL
EKG Q-T INTERVAL: 352 MS
EKG QRS DURATION: 76 MS
EKG QTC CALCULATION (BAZETT): 467 MS
EKG R AXIS: 265 DEGREES
EKG T AXIS: 107 DEGREES
EKG VENTRICULAR RATE: 106 BPM
EOSINOPHILS ABSOLUTE: 0 K/UL (ref 0–0.6)
EOSINOPHILS RELATIVE PERCENT: 0 %
GFR AFRICAN AMERICAN: >60
GFR NON-AFRICAN AMERICAN: >60
GLOBULIN: 3.3 G/DL
GLUCOSE BLD-MCNC: 131 MG/DL (ref 70–99)
GLUCOSE BLD-MCNC: 131 MG/DL (ref 70–99)
GLUCOSE BLD-MCNC: 141 MG/DL (ref 70–99)
GLUCOSE BLD-MCNC: 141 MG/DL (ref 70–99)
GLUCOSE BLD-MCNC: 142 MG/DL (ref 70–99)
GLUCOSE BLD-MCNC: 170 MG/DL (ref 70–99)
HCT VFR BLD CALC: 48.7 % (ref 40.5–52.5)
HEMOGLOBIN: 16.1 G/DL (ref 13.5–17.5)
LYMPHOCYTES ABSOLUTE: 0.6 K/UL (ref 1–5.1)
LYMPHOCYTES RELATIVE PERCENT: 6.3 %
MCH RBC QN AUTO: 32.1 PG (ref 26–34)
MCHC RBC AUTO-ENTMCNC: 33 G/DL (ref 31–36)
MCV RBC AUTO: 97 FL (ref 80–100)
MONOCYTES ABSOLUTE: 0.8 K/UL (ref 0–1.3)
MONOCYTES RELATIVE PERCENT: 8.7 %
NEUTROPHILS ABSOLUTE: 8 K/UL (ref 1.7–7.7)
NEUTROPHILS RELATIVE PERCENT: 84.9 %
PDW BLD-RTO: 13.8 % (ref 12.4–15.4)
PERFORMED ON: ABNORMAL
PLATELET # BLD: 276 K/UL (ref 135–450)
PMV BLD AUTO: 8.2 FL (ref 5–10.5)
POTASSIUM REFLEX MAGNESIUM: 4.5 MMOL/L (ref 3.5–5.1)
PROCALCITONIN: 0.15 NG/ML (ref 0–0.15)
RBC # BLD: 5.02 M/UL (ref 4.2–5.9)
SODIUM BLD-SCNC: 142 MMOL/L (ref 136–145)
TOTAL PROTEIN: 6.7 G/DL (ref 6.4–8.2)
WBC # BLD: 9.4 K/UL (ref 4–11)

## 2021-09-20 PROCEDURE — 6360000002 HC RX W HCPCS: Performed by: INTERNAL MEDICINE

## 2021-09-20 PROCEDURE — 99232 SBSQ HOSP IP/OBS MODERATE 35: CPT | Performed by: INTERNAL MEDICINE

## 2021-09-20 PROCEDURE — 6370000000 HC RX 637 (ALT 250 FOR IP): Performed by: INTERNAL MEDICINE

## 2021-09-20 PROCEDURE — 2700000000 HC OXYGEN THERAPY PER DAY

## 2021-09-20 PROCEDURE — 93010 ELECTROCARDIOGRAM REPORT: CPT | Performed by: INTERNAL MEDICINE

## 2021-09-20 PROCEDURE — 6370000000 HC RX 637 (ALT 250 FOR IP): Performed by: NURSE PRACTITIONER

## 2021-09-20 PROCEDURE — 2580000003 HC RX 258: Performed by: INTERNAL MEDICINE

## 2021-09-20 PROCEDURE — 94761 N-INVAS EAR/PLS OXIMETRY MLT: CPT

## 2021-09-20 PROCEDURE — 99233 SBSQ HOSP IP/OBS HIGH 50: CPT | Performed by: INTERNAL MEDICINE

## 2021-09-20 PROCEDURE — 2500000003 HC RX 250 WO HCPCS: Performed by: INTERNAL MEDICINE

## 2021-09-20 PROCEDURE — 85025 COMPLETE CBC W/AUTO DIFF WBC: CPT

## 2021-09-20 PROCEDURE — 84145 PROCALCITONIN (PCT): CPT

## 2021-09-20 PROCEDURE — 94640 AIRWAY INHALATION TREATMENT: CPT

## 2021-09-20 PROCEDURE — 80053 COMPREHEN METABOLIC PANEL: CPT

## 2021-09-20 PROCEDURE — C9113 INJ PANTOPRAZOLE SODIUM, VIA: HCPCS | Performed by: INTERNAL MEDICINE

## 2021-09-20 PROCEDURE — 99233 SBSQ HOSP IP/OBS HIGH 50: CPT | Performed by: NURSE PRACTITIONER

## 2021-09-20 PROCEDURE — 36415 COLL VENOUS BLD VENIPUNCTURE: CPT

## 2021-09-20 PROCEDURE — 1200000000 HC SEMI PRIVATE

## 2021-09-20 RX ORDER — DILTIAZEM HYDROCHLORIDE 60 MG/1
30 TABLET, FILM COATED ORAL EVERY 6 HOURS SCHEDULED
Status: DISCONTINUED | OUTPATIENT
Start: 2021-09-20 | End: 2021-09-21

## 2021-09-20 RX ORDER — VALSARTAN 80 MG/1
40 TABLET ORAL DAILY
Status: DISCONTINUED | OUTPATIENT
Start: 2021-09-20 | End: 2021-09-22

## 2021-09-20 RX ADMIN — DILTIAZEM HYDROCHLORIDE 30 MG: 60 TABLET, FILM COATED ORAL at 17:09

## 2021-09-20 RX ADMIN — Medication 2 PUFF: at 07:36

## 2021-09-20 RX ADMIN — AMIODARONE HYDROCHLORIDE 400 MG: 200 TABLET ORAL at 09:20

## 2021-09-20 RX ADMIN — PROCHLORPERAZINE EDISYLATE 10 MG: 5 INJECTION INTRAMUSCULAR; INTRAVENOUS at 15:42

## 2021-09-20 RX ADMIN — VALSARTAN 40 MG: 80 TABLET, FILM COATED ORAL at 15:47

## 2021-09-20 RX ADMIN — Medication 2 PUFF: at 16:43

## 2021-09-20 RX ADMIN — SODIUM CHLORIDE, PRESERVATIVE FREE 10 ML: 5 INJECTION INTRAVENOUS at 09:13

## 2021-09-20 RX ADMIN — METOPROLOL SUCCINATE 50 MG: 50 TABLET, EXTENDED RELEASE ORAL at 09:20

## 2021-09-20 RX ADMIN — Medication 2 PUFF: at 19:09

## 2021-09-20 RX ADMIN — ENOXAPARIN SODIUM 105 MG: 150 INJECTION SUBCUTANEOUS at 09:20

## 2021-09-20 RX ADMIN — PANTOPRAZOLE SODIUM 40 MG: 40 INJECTION, POWDER, FOR SOLUTION INTRAVENOUS at 09:13

## 2021-09-20 RX ADMIN — DILTIAZEM HYDROCHLORIDE 30 MG: 60 TABLET, FILM COATED ORAL at 23:28

## 2021-09-20 RX ADMIN — SODIUM CHLORIDE, PRESERVATIVE FREE 10 ML: 5 INJECTION INTRAVENOUS at 21:59

## 2021-09-20 RX ADMIN — CEFTRIAXONE 2000 MG: 2 INJECTION, POWDER, FOR SOLUTION INTRAMUSCULAR; INTRAVENOUS at 23:26

## 2021-09-20 RX ADMIN — METHYLPREDNISOLONE SODIUM SUCCINATE 40 MG: 40 INJECTION, POWDER, FOR SOLUTION INTRAMUSCULAR; INTRAVENOUS at 11:17

## 2021-09-20 RX ADMIN — DILTIAZEM HYDROCHLORIDE 12.5 MG/HR: 5 INJECTION INTRAVENOUS at 09:22

## 2021-09-20 RX ADMIN — ENOXAPARIN SODIUM 105 MG: 150 INJECTION SUBCUTANEOUS at 21:58

## 2021-09-20 RX ADMIN — Medication 2 PUFF: at 12:18

## 2021-09-20 RX ADMIN — METOPROLOL SUCCINATE 50 MG: 50 TABLET, EXTENDED RELEASE ORAL at 21:59

## 2021-09-20 RX ADMIN — DILTIAZEM HYDROCHLORIDE 30 MG: 60 TABLET, FILM COATED ORAL at 11:17

## 2021-09-20 NOTE — PROGRESS NOTES
Shift assessment complete, see flowsheets. Pt placed on a new telemetry box. Repositioned. Denies any needs at this time. Call light in easy reach, bedside table in easy reach, and bed in lowest position.   Aminta Aguillon RN

## 2021-09-20 NOTE — FLOWSHEET NOTE
09/20/21 0916   Vital Signs   Temp 97.1 °F (36.2 °C)   Temp Source Oral   Pulse 92   Resp 20   BP (!) 170/85   BP Location Left upper arm   Level of Consciousness Alert (0)   MEWS Score 1   Oxygen Therapy   SpO2 98 %   O2 Device Nasal cannula   O2 Flow Rate (L/min) 2 L/min     Patient resting quietly in bed. No s/s of distress noted. Shift assessment complete, see flow sheet. Denies needs at this time. Cardizem gtt infusing. Call light in reach. Will monitor.

## 2021-09-20 NOTE — PROGRESS NOTES
Hospitalist Progress Note      PCP: No primary care provider on file. Date of Admission: 9/14/2021    Subjective: extubated yesterday, more awake and alert today, HR still occasionally in 100s - on cardizem gtt    Medications:  Reviewed    Infusion Medications    dilTIAZem (CARDIZEM) 125 mg in dextrose 5% 125 mL infusion 12.5 mg/hr (09/19/21 1638)    dexmedetomidine HCl in NaCl Stopped (09/19/21 1004)    dextrose      sodium chloride       Scheduled Medications    amiodarone  400 mg Oral Daily    metoprolol succinate  50 mg Oral BID    albuterol sulfate HFA  2 puff Inhalation 4x daily    insulin lispro  0-12 Units SubCUTAneous 4x Daily AC & HS    pantoprazole  40 mg IntraVENous Daily    enoxaparin  1 mg/kg SubCUTAneous BID    mupirocin   Nasal BID    cefTRIAXone (ROCEPHIN) IV  2,000 mg IntraVENous Q24H    methylPREDNISolone  40 mg IntraVENous Q12H    nicotine  1 patch TransDERmal Daily    sodium chloride flush  5-40 mL IntraVENous 2 times per day     PRN Meds: ipratropium-albuterol, perflutren lipid microspheres, glucose, dextrose, glucagon (rDNA), dextrose, polyethylene glycol, acetaminophen **OR** acetaminophen, guaiFENesin-dextromethorphan, prochlorperazine, sodium chloride flush, sodium chloride, LORazepam **OR** LORazepam **OR** LORazepam **OR** LORazepam **OR** LORazepam **OR** LORazepam **OR** LORazepam **OR** LORazepam      Intake/Output Summary (Last 24 hours) at 9/19/2021 2110  Last data filed at 9/19/2021 1640  Gross per 24 hour   Intake 1406.24 ml   Output 4800 ml   Net -3393.76 ml       Physical Exam Performed:    BP (!) 129/95   Pulse 93   Temp 97 °F (36.1 °C)   Resp 21   Ht 5' 10\" (1.778 m)   Wt 234 lb 12.6 oz (106.5 kg)   SpO2 97%   BMI 33.69 kg/m²       GEN: awake  HEENT: NC/AT,EOMI, semi dry MM, no erythema/exudates or visible masses. CVS: Normal S1,S2.  Tachy RRR.  Without M/G/R.   LUNG: Bilat wheezes and course central BS.  Tachypnea, diminished/tight bilat.  ABD: Soft, ND/NT, BS+ x4.  Without G/R.  EXT: 2+ pulses, no c/c/e.  Brisk cap refill. LORRAINE:  awake, grossly non focal  SKIN: No rash or lesions on visible skin.       Labs:   Recent Labs     09/17/21 0423 09/18/21 0408 09/19/21  0409   WBC 6.7 5.5 7.2   HGB 15.1 15.7 16.0   HCT 45.2 46.9 48.9    197 198     Recent Labs     09/17/21 0423 09/18/21 0408 09/19/21  0409   * 137 142   K 5.3* 4.4 3.9    102 101   CO2 22 27 29   BUN 27* 31* 32*   CREATININE 0.9 0.8 0.7*   CALCIUM 7.6* 8.0* 8.2*     Recent Labs     09/17/21 0423 09/18/21 0408 09/19/21  0409   AST 74* 45* 46*   ALT 67* 48* 51*   BILITOT 0.3 0.3 0.4   ALKPHOS 73 64 59     No results for input(s): INR in the last 72 hours. No results for input(s): Joeline Reeks in the last 72 hours. Urinalysis:      Lab Results   Component Value Date    NITRU Negative 09/15/2021    WBCUA 0-2 09/15/2021    BACTERIA 1+ 09/15/2021    RBCUA 0-2 09/15/2021    BLOODU MODERATE 09/15/2021    SPECGRAV 1.025 09/15/2021    GLUCOSEU Negative 09/15/2021       Radiology:  XR CHEST PORTABLE   Final Result   Interval extubation. The lungs appear clear. XR CHEST PORTABLE   Final Result   Improving aeration in the lungs with minimal bibasilar opacities remaining. XR CHEST PORTABLE   Final Result   No substantial interval change in mild bibasilar airspace disease as compared   to prior. XR CHEST PORTABLE   Final Result   Improving aeration of the lungs. XR CHEST PORTABLE   Final Result   Lines and tubes as described. Multifocal airspace disease appears similar to prior exam.         CT CHEST PULMONARY EMBOLISM W CONTRAST   Final Result   No evidence of pulmonary embolism. COVID-19 pneumonia as described above. Irregular right upper lobe mass as measured above. RECOMMENDATIONS:   Chest CT in 3 months is recommended for follow-up evaluation of right upper   lobe irregular lesion.          XR CHEST PORTABLE Final Result   1. Nodular/ill-defined opacity in the right upper lung zone. Recommend   dedicated noncontrast CT for further evaluation and to exclude a nodule. 2. Patchy right-sided pulmonary opacities are noted, which could represent   multifocal pneumonia, atelectasis or atypical edema. 3. Small bilateral pleural fluid.                  Assessment/Plan:    Active Hospital Problems    Diagnosis     Acute respiratory failure with hypoxia (HCC) [J96.01]     Multifocal pneumonia [J18.9]     Suspected COVID-19 virus infection [Z20.822]     Sepsis ([facility] [A41.9]     Acute on chronic respiratory failure with hypoxia (HCC) [J96.21]          Acute respiratory failure with hypoxia and hypercapnia  - likely related to #3  - Admitted to ICU, tele  - supplemental O2 to maintain SPO2 ? 92%, cont pulse ox  - Wean as tolerated.  Started on BiPAP per my orders.  ABG's/CXRs ordered  - pt intubated on 9/14  - Intensivist c/s.   - extubated 9/18  - on 3L O2     Sepsis  - 2/2 PNA  -  ABX as below  - No need for pressors at this time  -  F/u blood, resp cx sent     Multifocal PNA  - (CAP - likely GP organism) w/ concern for underlying chronic lung path (COPD, asthma, etc); PCT 1.02  -  IV solumedrol, IV azithro/ceftriaxone day #4  - PRN/[facility] intensive NEB therapy.  Check respiratory culture; urine legionella and strep pnuemo - neg  - Pulm consulted.  Hold home regimen for now.      Atrial Fibrillation with RVR  - unknown hx; TSH normal  - full dose lovenox  -On amiodarone drip-->PO  - started cardizem gtt on 9/18 - increased from 10-->12.5mg/hr  - cardiology following   Echocardiogram ordered     Hyponatremia  - resolved     Hyperglycemia  - likely 2/2 steroids  - low dose SSI, check Hgb A1c  - PRN hypoglycemia protocol in place     Transaminitis  - slightly worsened from admission  - no priors available for comparison  - monitor LFTs     Irregular RUL mass  - 2.4 cm in greatest dimension.  Hilar LAD  - Will need OP follow up/imaging in 3 mos.  No PCP per Epic.       Prolonged QTc  - 474 ms, avoid QT prolonging agents as able  - tele     Tobacco Abuse  - counseled cessation  - 14 mg nicotine patch.         DVT Prophylaxis: Lovenox  Diet: ADULT DIET;  Regular  Code Status: Full Code    PT/OT Eval Status: ordered    Dispo - cont care, transfer out of ICU to PCU    Nita Ambriz MD

## 2021-09-20 NOTE — PROGRESS NOTES
[address] 83   Daily Progress Note    Admit Date:  9/14/2021  HPI:    Chief Complaint   Patient presents with    Shortness of Breath     for 3 days      Rigobertoen Scarlet with 3 days of shortness of breath, productive cough, fevers chills and GI symptoms. He was found to be in acute hypoxic respiratory failure. He failed BiPAP and required mechanical ventilation from 9/14/2021 to 9/18/2021. Developed atrial fibrillation with RVR. An echocardiogram also was completed and showed LV dysfunction with an EF of 35 to 40%. Subjective:  Mr. Zamorano Kin seen sitting up in bed, continues to have shortness of breath at rest.  He denies any palpitations or chest pain. Objective:   Patient Vitals for the past 24 hrs:   BP Temp Temp src Pulse Resp SpO2 Weight   09/20/21 1220 -- -- -- -- -- 91 % --   09/20/21 0916 (!) 170/85 97.1 °F (36.2 °C) Oral 92 20 98 % --   09/20/21 0739 -- -- -- -- -- 97 % --   09/20/21 0600 (!) 137/94 96.9 °F (36.1 °C) Oral 98 22 97 % --   09/20/21 0426 -- -- -- -- -- -- 257 lb 2 oz (116.6 kg)   09/19/21 1927 (!) 129/95 97 °F (36.1 °C) -- 93 -- 97 % --   09/19/21 1800 (!) 164/77 -- -- 101 21 96 % --   09/19/21 1700 (!) 163/88 -- -- 96 20 95 % --   09/19/21 1600 (!) 164/85 96.4 °F (35.8 °C) Oral 105 24 97 % --   09/19/21 1547 (!) 167/86 -- -- 108 18 97 % --   09/19/21 1532 (!) 160/73 -- -- 97 22 97 % --   09/19/21 1517 (!) 142/80 -- -- 97 20 97 % --   09/19/21 1500 (!) 159/90 -- -- 107 21 95 % --   09/19/21 1451 (!) 174/98 -- -- 108 24 98 % --   09/19/21 1432 (!) 159/82 -- -- 95 22 96 % --   09/19/21 1400 (!) 156/91 -- -- 102 21 97 % --   09/19/21 1300 (!) 141/66 -- -- 93 20 97 % --       Intake/Output Summary (Last 24 hours) at 9/20/2021 1257  Last data filed at 9/20/2021 0426  Gross per 24 hour   Intake 402.99 ml   Output 1625 ml   Net -1222.01 ml     Wt Readings from Last 3 Encounters:   09/20/21 257 lb 2 oz (116.6 kg)         ASSESSMENT:   1.  Atrial fibrillation/RVR: Remains in atrial fibrillation though rate is controlled, anticoagulation with full dose Lovenox  2. Acute hypoxic respiratory failure: Remains dyspneic at rest, on 2 L NC  3. Multifocal pneumonia: Strep, per IM and pulmonary  4. Cardiomyopathy: Unknown etiology, EF 35-40%, with RV dysfunction  5. COPD  6. Hypertension: Suboptimal      PLAN:  1. Agree with adding diltiazem 30 mg p.o. 4 times daily and weaning off IV diltiazem infusion  2. Continue with amiodarone 400 mg daily and Toprol 50 mg twice daily  3. Start low-dose ARB for hypertension and biventricular dysfunction  4. We will need ischemia eval however will likely be several days given continued dyspnea at rest  5.  Daily labs, daily weights    Rachele Murphy, HANNAH - CNP, 9/20/2021, 12:57 PM  [facility]   [phone]       Telemetry: A. fib   NYHA: IV    Physical Exam:  General:  Awake, alert, NAD  Skin:  Warm and dry  Neck:  JVP difficult to assess  Chest: Clear to auscultation though diminished in the bases  Cardiovascular: Irregularly irregular, normal S1S2, no appreciable MRG  Abdomen: Firm, distended, nontender, +bowel sounds  Extremities: 1+ nonpitting BLE edema, purplish discoloration to bilateral feet, 1+ pedal pulses, warm      Medications:    dilTIAZem  30 mg Oral 4 times per day    insulin lispro  0-12 Units SubCUTAneous TID WC    insulin lispro  0-6 Units SubCUTAneous Nightly    [START ON 9/21/2021] predniSONE  30 mg Oral Daily    amiodarone  400 mg Oral Daily    metoprolol succinate  50 mg Oral BID    albuterol sulfate HFA  2 puff Inhalation 4x daily    pantoprazole  40 mg IntraVENous Daily    enoxaparin  1 mg/kg SubCUTAneous BID    cefTRIAXone (ROCEPHIN) IV  2,000 mg IntraVENous Q24H    nicotine  1 patch TransDERmal Daily    sodium chloride flush  5-40 mL IntraVENous 2 times per day      dilTIAZem (CARDIZEM) 125 mg in dextrose 5% 125 mL infusion 12.5 mg/hr (09/20/21 0922)    dextrose      sodium chloride         Lab Data: Lab results independently reviewed and analyzed by myself 9/20/21   CBC:   Recent Labs     09/18/21  0408 09/19/21  0409 09/20/21  0408   WBC 5.5 7.2 9.4   HGB 15.7 16.0 16.1    198 276     BMP:    Recent Labs     09/18/21  0408 09/19/21  0409 09/20/21  0408    142 142   K 4.4 3.9 4.5   CO2 27 29 31   BUN 31* 32* 27*   CREATININE 0.8 0.7* 0.6*     INR:  No results for input(s): INR in the last 72 hours. BNP:  No results for input(s): PROBNP in the last 72 hours. Cardiac Enzymes: No results for input(s): TROPONINI in the last 72 hours. Lipids:   Lab Results   Component Value Date    TRIG 214 09/17/2021       Cardiac Imaging:   ECHO 9/14/2021:     Summary   Right ventricular systolic function is mildly to moderately reduced . The right atrium is mildly dilated. Mild posterior mitral annular calcification is present. Aortic valve sclerosis without aortic stenosis. Normal systolic pulmonary artery pressure (SPAP) estimated at 33 mmHg (RA pressure 15 mmHg). Left ventricular systolic function is moderately reduced with ejection fraction estimated at 35-40 %. There is moderate global hypokinesis present. Left ventricle size is normal.   There is mild concentric left ventricular hypertrophy. Elevated left ventricular diastolic filling pressure: Septal E/e'' = 12.2 .

## 2021-09-20 NOTE — PROGRESS NOTES
Hospitalist Progress Note      PCP: No primary care provider on file. Date of Admission: 9/14/2021    resp failure, was on vent support , Afibb with RVR      Subjective:     Mr Javier Perkins seen up in bed,  Fully awake and alert today, HR still occasionally in 100s - on cardizem gtt    Still SOB with any movement      Medications:  Reviewed    Infusion Medications    dilTIAZem (CARDIZEM) 125 mg in dextrose 5% 125 mL infusion 12.5 mg/hr (09/19/21 1638)    dexmedetomidine HCl in NaCl Stopped (09/19/21 1004)    dextrose      sodium chloride       Scheduled Medications    amiodarone  400 mg Oral Daily    metoprolol succinate  50 mg Oral BID    albuterol sulfate HFA  2 puff Inhalation 4x daily    insulin lispro  0-12 Units SubCUTAneous 4x Daily AC & HS    pantoprazole  40 mg IntraVENous Daily    enoxaparin  1 mg/kg SubCUTAneous BID    mupirocin   Nasal BID    cefTRIAXone (ROCEPHIN) IV  2,000 mg IntraVENous Q24H    methylPREDNISolone  40 mg IntraVENous Q12H    nicotine  1 patch TransDERmal Daily    sodium chloride flush  5-40 mL IntraVENous 2 times per day     PRN Meds: ipratropium-albuterol, perflutren lipid microspheres, glucose, dextrose, glucagon (rDNA), dextrose, polyethylene glycol, acetaminophen **OR** acetaminophen, guaiFENesin-dextromethorphan, prochlorperazine, sodium chloride flush, sodium chloride, LORazepam **OR** LORazepam **OR** LORazepam **OR** LORazepam **OR** LORazepam **OR** LORazepam **OR** LORazepam **OR** LORazepam      Intake/Output Summary (Last 24 hours) at 9/20/2021 6064  Last data filed at 9/20/2021 0426  Gross per 24 hour   Intake 771.79 ml   Output 3425 ml   Net -2653.21 ml       Physical Exam Performed:    BP (!) 137/94   Pulse 98   Temp 96.9 °F (36.1 °C) (Oral)   Resp 22   Ht 5' 10\" (1.778 m)   Wt 257 lb 2 oz (116.6 kg)   SpO2 97%   BMI 36.89 kg/m²           General: middle aged male, up in bed   Awake, alert and oriented.  Appears to be not in any Irregular right upper lobe mass as measured above. RECOMMENDATIONS:   Chest CT in 3 months is recommended for follow-up evaluation of right upper   lobe irregular lesion. XR CHEST PORTABLE   Final Result   1. Nodular/ill-defined opacity in the right upper lung zone. Recommend   dedicated noncontrast CT for further evaluation and to exclude a nodule. 2. Patchy right-sided pulmonary opacities are noted, which could represent   multifocal pneumonia, atelectasis or atypical edema. 3. Small bilateral pleural fluid. Sputum -- strep pneumoniae  Blood - NGTD  covid - not detected     ECHO       Right ventricular systolic function is mildly to moderately reduced . The right atrium is mildly dilated. Mild posterior mitral annular calcification is present. Aortic valve sclerosis without aortic stenosis. Normal systolic pulmonary artery pressure (SPAP) estimated at 33 mmHg (RA   pressure 15 mmHg). Left ventricular systolic function is moderately reduced with ejection fraction estimated at 35-40 %. There is moderate global hypokinesis present. Left ventricle size is normal.   There is mild concentric left ventricular hypertrophy. Elevated left ventricular diastolic filling pressure: Septal E/e'' = 12.2 . Assessment/Plan:    Active Hospital Problems    Diagnosis     Acute respiratory failure with hypoxia ([address] [J96.01]     Multifocal pneumonia [J18.9]     Suspected COVID-19 virus infection [Z20.822]     Septicemia ([address] [A41.9]     Acute on chronic respiratory failure with hypoxia ([address] [J96.21]          Acute respiratory failure with hypoxia and hypercapnia  Streptococcus pneumonia   Copd exacerbation     - Admitted to ICU   , worsened hypoxia, failed bipap and needed intubation and vent support for few days ( 9./14)  - Intensivist c/s.   - extubated 9/18  - now on 3L O2     Sepsis  - sec to strep pna   -  ABX as below  - BP stable   - blood cx remain neg, wbc stable.  No fevers     Multifocal PNA- streptococcus pna  - treated with azithromycin for 5 days, rocephin D7  - PRN/AURORA intensive NEB therapy.    - need f/w imaging see below      Atrial Fibrillation with RVR  -  started cardizem gtt on 9/18 - increased from 10-->12.5mg/hr  - added BB and later amio initiated  - wean cardizem gtt  - ECHO with low EF of 35- 40 %. Might need ischemic workup and close follow up   - cardiology following   - TSH wnl. On lovenox bid. Can switch to oral AC     Hyponatremia  - resolved     Hyperglycemia  - likely 2/2 steroids, no DM , A1c at 5.4  - low dose SSI     Transaminitis  - slightly worsened from admission- likely with pna  - no priors available for comparison  - monitor LFTs     Irregular RUL lung mass  - 2.4 cm  In size with H ilar LAD  - need close f/w . pulm managing         Tobacco Abuse  - counseled cessation  - 14 mg nicotine patch.         DVT Prophylaxis: Lovenox  Diet: ADULT DIET;  Regular  Code Status: Full Code    PT/OT Eval Status: ordered    Alessandro Rebollar MD

## 2021-09-20 NOTE — PROGRESS NOTES
Pulmonary Progress Note    CC: Shortness of breath    Events of Last 24 hours:         MV: 9/14/2021-9/18/2021, extubated to [facility]   Vent Mode: AC/VC Rate Set: 0 bmp/Vt Ordered: 480 mL/ /FiO2 : 30 %  Recent Labs     09/18/21  0348   PHART 7.463*   YWE9ATO 40.9   PO2ART 80.5       IV:   dilTIAZem (CARDIZEM) 125 mg in dextrose 5% 125 mL infusion 12.5 mg/hr (09/20/21 0922)    dextrose      sodium chloride         Vitals:  Blood pressure (!) 170/85, pulse 92, temperature 97.1 °F (36.2 °C), temperature source Oral, resp. rate 20, height 5' 10\" (1.778 m), weight 257 lb 2 oz (116.6 kg), SpO2 91 %. On 2  L  EXAM:  General: Looks better, NAD  Eyes: PERRL. No sclera icterus. No conjunctival injection. ENT: No discharge. Pharynx clear. Neck: Trachea midline. Normal thyroid. Resp: No accessory muscle use. No crackles. No wheezing. No rhonchi. Decreased breath sounds  CV: Regular rate. Irregular rhythm. No mumur or rub. No edema. M/S: No cyanosis. No joint deformity. No clubbing. Neuro: Alert and oriented to person and events, speech is fluent. Psych: No agitation, no anxiety, affect is full.     Scheduled Meds:   dilTIAZem  30 mg Oral 4 times per day    insulin lispro  0-12 Units SubCUTAneous TID WC    insulin lispro  0-6 Units SubCUTAneous Nightly    amiodarone  400 mg Oral Daily    metoprolol succinate  50 mg Oral BID    albuterol sulfate HFA  2 puff Inhalation 4x daily    pantoprazole  40 mg IntraVENous Daily    enoxaparin  1 mg/kg SubCUTAneous BID    cefTRIAXone (ROCEPHIN) IV  2,000 mg IntraVENous Q24H    methylPREDNISolone  40 mg IntraVENous Q12H    nicotine  1 patch TransDERmal Daily    sodium chloride flush  5-40 mL IntraVENous 2 times per day     PRN Meds:  ipratropium-albuterol, perflutren lipid microspheres, glucose, dextrose, glucagon (rDNA), dextrose, polyethylene glycol, acetaminophen **OR** acetaminophen, guaiFENesin-dextromethorphan, prochlorperazine, sodium chloride flush, sodium chloride, LORazepam **OR** LORazepam **OR** LORazepam **OR** LORazepam **OR** LORazepam **OR** LORazepam **OR** LORazepam **OR** LORazepam    Results:  CBC:   Recent Labs     09/18/21  0408 09/19/21  0409 09/20/21  0408   WBC 5.5 7.2 9.4   HGB 15.7 16.0 16.1   HCT 46.9 48.9 48.7   MCV 96.6 97.7 97.0    198 276     BMP:   Recent Labs     09/18/21  0408 09/19/21  0409 09/20/21  0408    142 142   K 4.4 3.9 4.5    101 100   CO2 27 29 31   BUN 31* 32* 27*   CREATININE 0.8 0.7* 0.6*     LIVER PROFILE:   Recent Labs     09/18/21  0408 09/19/21 0409 09/20/21  0408   AST 45* 46* 57*   ALT 48* 51* 68*   BILITOT 0.3 0.4 0.7   ALKPHOS 64 59 63       Cultures:  9/14/2021 SARS-CoV-2 NAAT and PCR negative  9/14/2021 blood NGTD  9/15/2021 urine antigens are negative  9/15/2021 tracheal aspirate strep pneumonia    Films:  9/14/21 CTPA   Pulmonary Arteries: Pulmonary arteries are adequately opacified for   evaluation.  No evidence of intraluminal filling defect to suggest pulmonary   embolism.  Main pulmonary artery is normal in caliber.       Mediastinum: The central airways are clear.  Right hilar lymphadenopathy   measuring 2.3 x 1.1 cm (2, 135).  The heart and pericardium demonstrate no   acute abnormality.  Atherosclerotic disease of the thoracic aorta.       Lungs/pleura: Irregular right upper lobe nodule measures 2.4 x 1.5 cm (2,   51).   Right lower lobe consolidation and scattered bilateral ground-glass   opacities, consistent with COVID-19 pneumonia.  Panlobular emphysema.  No   pleural effusion or pneumothorax.       Upper Abdomen: Right adrenal gland nodule measuring 1.6 cm.  Left adrenal   gland hyperplasia.  Rest of visualized upper abdomen is unremarkable.       Soft Tissues/Bones: Degenerate disease of the thoracic spine.  No focal   osseous lesion.  Visualized soft tissues are unremarkable.           Impression   No evidence of pulmonary embolism.       COVID-19 pneumonia as described above.     Irregular right upper lobe mass as measured above. CXR 9/18/2021 ET tube okay     ASSESSMENT:  · Acute hypoxic and hypercapnic respiratory failure  · Community acquired pneumonia -streptococcal pneumonia  · 2.4 cm right upper lobe pulmonary nodule and enlarged right hilar LN; concerning for malignancy.     · Right adrenal nodule 1.6 cm, not characterized by reading radiologist  · Pulmonary emphysema  · Afib RVR      PLAN:  Supplemental oxygen to maintain SaO2 >92%; wean as tolerated    CTX day #7/7, received 5 days Azithromycin   Change IV solumedrol to prednisone taper  Tobacco cessation is recommended   · Consider outpatient CT PET for suspicious Pulmonary Nodule/hilar lymphadenopathy

## 2021-09-21 ENCOUNTER — APPOINTMENT (OUTPATIENT)
Dept: NUCLEAR MEDICINE | Age: 66
DRG: 720 | End: 2021-09-21
Payer: COMMERCIAL

## 2021-09-21 LAB
A/G RATIO: 1.1 (ref 1.1–2.2)
ALBUMIN SERPL-MCNC: 3.4 G/DL (ref 3.4–5)
ALP BLD-CCNC: 63 U/L (ref 40–129)
ALT SERPL-CCNC: 91 U/L (ref 10–40)
ANION GAP SERPL CALCULATED.3IONS-SCNC: 7 MMOL/L (ref 3–16)
AST SERPL-CCNC: 73 U/L (ref 15–37)
BASOPHILS ABSOLUTE: 0 K/UL (ref 0–0.2)
BASOPHILS RELATIVE PERCENT: 0.1 %
BILIRUB SERPL-MCNC: 0.7 MG/DL (ref 0–1)
BUN BLDV-MCNC: 26 MG/DL (ref 7–20)
CALCIUM SERPL-MCNC: 8.9 MG/DL (ref 8.3–10.6)
CHLORIDE BLD-SCNC: 102 MMOL/L (ref 99–110)
CO2: 33 MMOL/L (ref 21–32)
CREAT SERPL-MCNC: 0.6 MG/DL (ref 0.8–1.3)
EOSINOPHILS ABSOLUTE: 0 K/UL (ref 0–0.6)
EOSINOPHILS RELATIVE PERCENT: 0 %
GFR AFRICAN AMERICAN: >60
GFR NON-AFRICAN AMERICAN: >60
GLOBULIN: 3.1 G/DL
GLUCOSE BLD-MCNC: 117 MG/DL (ref 70–99)
GLUCOSE BLD-MCNC: 133 MG/DL (ref 70–99)
GLUCOSE BLD-MCNC: 134 MG/DL (ref 70–99)
GLUCOSE BLD-MCNC: 151 MG/DL (ref 70–99)
GLUCOSE BLD-MCNC: 160 MG/DL (ref 70–99)
HCT VFR BLD CALC: 48.1 % (ref 40.5–52.5)
HEMOGLOBIN: 16 G/DL (ref 13.5–17.5)
LV EF: 60 %
LVEF MODALITY: NORMAL
LYMPHOCYTES ABSOLUTE: 0.5 K/UL (ref 1–5.1)
LYMPHOCYTES RELATIVE PERCENT: 4.5 %
MCH RBC QN AUTO: 32.4 PG (ref 26–34)
MCHC RBC AUTO-ENTMCNC: 33.3 G/DL (ref 31–36)
MCV RBC AUTO: 97.3 FL (ref 80–100)
MONOCYTES ABSOLUTE: 1.1 K/UL (ref 0–1.3)
MONOCYTES RELATIVE PERCENT: 10.3 %
NEUTROPHILS ABSOLUTE: 9.2 K/UL (ref 1.7–7.7)
NEUTROPHILS RELATIVE PERCENT: 85.1 %
PDW BLD-RTO: 14.1 % (ref 12.4–15.4)
PERFORMED ON: ABNORMAL
PLATELET # BLD: 257 K/UL (ref 135–450)
PMV BLD AUTO: 8.4 FL (ref 5–10.5)
POTASSIUM REFLEX MAGNESIUM: 4.9 MMOL/L (ref 3.5–5.1)
RBC # BLD: 4.95 M/UL (ref 4.2–5.9)
SODIUM BLD-SCNC: 142 MMOL/L (ref 136–145)
TOTAL PROTEIN: 6.5 G/DL (ref 6.4–8.2)
TROPONIN: <0.01 NG/ML
TROPONIN: <0.01 NG/ML
WBC # BLD: 10.9 K/UL (ref 4–11)

## 2021-09-21 PROCEDURE — 2580000003 HC RX 258: Performed by: INTERNAL MEDICINE

## 2021-09-21 PROCEDURE — 6370000000 HC RX 637 (ALT 250 FOR IP): Performed by: INTERNAL MEDICINE

## 2021-09-21 PROCEDURE — 97535 SELF CARE MNGMENT TRAINING: CPT

## 2021-09-21 PROCEDURE — 94761 N-INVAS EAR/PLS OXIMETRY MLT: CPT

## 2021-09-21 PROCEDURE — 6360000002 HC RX W HCPCS: Performed by: INTERNAL MEDICINE

## 2021-09-21 PROCEDURE — 80053 COMPREHEN METABOLIC PANEL: CPT

## 2021-09-21 PROCEDURE — 97162 PT EVAL MOD COMPLEX 30 MIN: CPT

## 2021-09-21 PROCEDURE — 97530 THERAPEUTIC ACTIVITIES: CPT

## 2021-09-21 PROCEDURE — 1200000000 HC SEMI PRIVATE

## 2021-09-21 PROCEDURE — 99233 SBSQ HOSP IP/OBS HIGH 50: CPT | Performed by: INTERNAL MEDICINE

## 2021-09-21 PROCEDURE — 6370000000 HC RX 637 (ALT 250 FOR IP): Performed by: NURSE PRACTITIONER

## 2021-09-21 PROCEDURE — 84484 ASSAY OF TROPONIN QUANT: CPT

## 2021-09-21 PROCEDURE — 97166 OT EVAL MOD COMPLEX 45 MIN: CPT

## 2021-09-21 PROCEDURE — 85025 COMPLETE CBC W/AUTO DIFF WBC: CPT

## 2021-09-21 PROCEDURE — 94640 AIRWAY INHALATION TREATMENT: CPT

## 2021-09-21 PROCEDURE — C9113 INJ PANTOPRAZOLE SODIUM, VIA: HCPCS | Performed by: INTERNAL MEDICINE

## 2021-09-21 PROCEDURE — 78452 HT MUSCLE IMAGE SPECT MULT: CPT

## 2021-09-21 PROCEDURE — A9502 TC99M TETROFOSMIN: HCPCS | Performed by: INTERNAL MEDICINE

## 2021-09-21 PROCEDURE — 2700000000 HC OXYGEN THERAPY PER DAY

## 2021-09-21 PROCEDURE — 36415 COLL VENOUS BLD VENIPUNCTURE: CPT

## 2021-09-21 PROCEDURE — 3430000000 HC RX DIAGNOSTIC RADIOPHARMACEUTICAL: Performed by: INTERNAL MEDICINE

## 2021-09-21 RX ORDER — METOPROLOL SUCCINATE 50 MG/1
150 TABLET, EXTENDED RELEASE ORAL DAILY
Status: DISCONTINUED | OUTPATIENT
Start: 2021-09-21 | End: 2021-09-23 | Stop reason: HOSPADM

## 2021-09-21 RX ORDER — HYDRALAZINE HYDROCHLORIDE 25 MG/1
25 TABLET, FILM COATED ORAL EVERY 8 HOURS SCHEDULED
Status: DISCONTINUED | OUTPATIENT
Start: 2021-09-21 | End: 2021-09-22

## 2021-09-21 RX ADMIN — VALSARTAN 40 MG: 80 TABLET, FILM COATED ORAL at 08:34

## 2021-09-21 RX ADMIN — SODIUM CHLORIDE, PRESERVATIVE FREE 10 ML: 5 INJECTION INTRAVENOUS at 21:22

## 2021-09-21 RX ADMIN — Medication 2 PUFF: at 19:25

## 2021-09-21 RX ADMIN — HYDRALAZINE HYDROCHLORIDE 25 MG: 25 TABLET, FILM COATED ORAL at 21:19

## 2021-09-21 RX ADMIN — HYDRALAZINE HYDROCHLORIDE 25 MG: 25 TABLET, FILM COATED ORAL at 13:47

## 2021-09-21 RX ADMIN — AMIODARONE HYDROCHLORIDE 400 MG: 200 TABLET ORAL at 08:33

## 2021-09-21 RX ADMIN — DILTIAZEM HYDROCHLORIDE 30 MG: 60 TABLET, FILM COATED ORAL at 06:34

## 2021-09-21 RX ADMIN — PREDNISONE 30 MG: 10 TABLET ORAL at 08:34

## 2021-09-21 RX ADMIN — Medication 2 PUFF: at 16:21

## 2021-09-21 RX ADMIN — TETROFOSMIN 32.8 MILLICURIE: 1.38 INJECTION, POWDER, LYOPHILIZED, FOR SOLUTION INTRAVENOUS at 12:20

## 2021-09-21 RX ADMIN — ENOXAPARIN SODIUM 105 MG: 150 INJECTION SUBCUTANEOUS at 21:18

## 2021-09-21 RX ADMIN — ENOXAPARIN SODIUM 105 MG: 150 INJECTION SUBCUTANEOUS at 08:34

## 2021-09-21 RX ADMIN — Medication 2 PUFF: at 08:13

## 2021-09-21 RX ADMIN — METOPROLOL SUCCINATE 150 MG: 50 TABLET, EXTENDED RELEASE ORAL at 08:33

## 2021-09-21 RX ADMIN — PANTOPRAZOLE SODIUM 40 MG: 40 INJECTION, POWDER, FOR SOLUTION INTRAVENOUS at 08:35

## 2021-09-21 NOTE — PROGRESS NOTES
Inpatient Physical Therapy Evaluation and Treatment    Unit: 2 [place]  Date:  9/21/2021  Patient Name:    Tereso Leyva  Admitting diagnosis:  Septicemia [facility]) [A41.9]  Acute respiratory failure with hypoxia ([facility] [J96.01]  Acute on chronic respiratory failure with hypoxia ([facility] [J96.21]  Pneumonia due to COVID-19 virus [U07.1, J12.82]  Admit Date:  9/14/2021  Precautions/Restrictions/WB Status/ Lines/ Wounds/ Oxygen: Fall risk, Bed/chair alarm, Lines -Supplemental O2 (2L) and Avina catheter, Telemetry and Continuous pulse oximetry    Treatment Time:  08:20-09:10  Treatment Number:  1   Timed Code Treatment Minutes: 40 minutes  Total Treatment Minutes:  50  minutes    Patient Goals for Therapy: \" to go home \"          Discharge Recommendations: Home 24 hr assist  and Home PT  DME needs for discharge: RW and shower chair       Therapy recommendation for EMS Transport: can transport by wheelchair    Therapy recommendations for staff:   Assist of 1 with use of rolling walker (RW) for all transfers to/from [facility]  to/from [facility]    History of Present Illness: Per Dr. Nereida Anton H&P 9/14: \"The patient is a 61 y.o. male with PMH below, presents with SOB/BROWN, resp distress, productive cough, hypoxia, fever, chills, diarrhea, nausea. Patient presented to the ER in respiratory distress and satting 88% on room air. He is not normally on O2. He has been requiring 3 to 4 L of O2 to maintain sats. Blood gas was obtained and he is now being started on BiPAP secondary to respiratory acidosis and hypercapnia. He denies history of lung problems. He is currently a smoker and has been long-term. Strongly suspect some underlying lung pathology. He has had associated productive cough, nausea, diarrhea, fever and chills. Symptoms started 3 days ago. \"  9/18 - extubated to airvo at [facility] AT [facility] S[facility] Level Recommendation:  Level 1 Standard  AM-PAC Mobility Score            Preadmission Environment    Pt.  Lives with family (brother and his wife )  Home environment:    mobile home/trailer  Steps to enter first floor: ramp  Steps to second floor: N/A  Bathroom: tub/shower unit, comfort height toilet and uses a foot stool to sit on in the shower  Equipment owned: [address] and raised toilet, walking stick   Sleeps in flat non-adjustable bed.     Preadmission Status:  Pt. Able to drive: No sister in law takes pts where he needs to go  Pt Fully independent with ADLs: Yes  Pt. Required assistance from family for: Cleaning, Cooking and Laundry  Pt  states he does some cleaning  Pt. independent for transfers and gait and walked with Inna Felisas when hip hurts at times   History of falls Yes- several months ago     Pain   No  Location: N/A  Rating: NA /10  Pain Medicine Status: No request made    Cognition    A&O Person, Place and Situation . Knows year, not month or date. Able to follow 2 step commands    Subjective  Patient lying supine in bed with no family present. Receiving morning meds from RN. Pt agreeable to this PT eval & tx. Upper Extremity ROM/Strength  Please see OT evaluation. Lower Extremity ROM / Strength   AROM WFL: Yes    BLE strength impaired, but not formally assessed with MMT. Lower Extremity Sensation    WFL    Lower Extremity Proprioception:   WFL    Coordination and Tone  WFL    Balance  Sitting:  Good ; Supervision  Comments: at EOB ~10 minutes during examination and vitals assessment    Standing: Fair +; CGA  Comments: with walker    Bed Mobility   Supine to Sit:    SBA with HOB elevated ~15* and use of bedrail. Additional time to complete. Sit to Supine:   Not Tested  Rolling:   Not Tested  Scooting in sitting: Supervision  Scooting in supine:  Not Tested    Transfer Training     Sit to stand:   CGA  Stand to sit:   SBA  Bed to Chair:   CGA with use of rolling walker (RW)    Gait gait deferred due to weakness and fatigue; pt ambulated 0 ft.    Distance:      N/A ft  Deviations (firm surface/linoleum):  N/A  Assistive Device Used:    N/A  Level of Assist:    N/A  Comment: N/A    Stair Training deferred, pt does not have stairs in home environment    Activity Tolerance   Pt completed therapy session with Dizziness noted with initial supine>sit transition    BP (mmHg)  HR (bpm)  SpO2 (%) Comments    Semifowlers before activity   149/82 77 96% on 2L     EOB    84 90% on 2L Mild dizziness initially    Seated after activity    92% on 2L      Positioning Needs   Pt up in chair, alarm set, positioned in proper neutral alignment and pressure relief provided. Call light provided and all needs within reach    Exercises Initiated  all completed bilaterally unless indicated  Ankle Pumps x 10 reps  Heel slides x 2 reps  LAQ x 15 reps  marching x 15 reps    Other  None. Patient/Family Education   Pt educated on role of inpatient PT, POC, importance of continued activity, DC recommendations, transfer techniques, pursed lip breathing and calling for assist with mobility. Assessment  Pt seen for Physical Therapy evaluation in acute care setting. Pt demonstrated decreased Activity tolerance, Balance, Safety and Strength as well as decreased independence with Ambulation, Bed Mobility  and Transfers. Pt will benefit from skilled PT in acute setting to promote activity tolerance and independent functional mobility. Recommending Home 24 hr assist and with home PT upon discharge as patient functioning below baseline level and would benefit from continued therapy services    Goals : To be met in 3 visits:  1). Independent with LE Ex x 10 reps    To be met in 6 visits:  1). Supine to/from sit: Independent  2). Sit to/from stand: Modified Independent  3). Bed to chair: Modified Independent  4). Gait: Ambulate 150 ft.  with  Modified Independent and use of No AD  5).   Tolerate B LE exercises 3 sets of 10-15 reps    Rehabilitation Potential: Good  Strengths for achieving goals include:   Pt motivated, PLOF and Pt cooperative   Barriers to achieving goals include:    No Barriers    Plan    To be seen 3-5 x / week  while in acute care setting for therapeutic exercises, bed mobility, transfers, progressive gait training, balance training, and family/patient education. Signature: Keyur Pascual, PT, DPT    If patient discharges from this facility prior to next visit, this note will serve as the Discharge Summary.

## 2021-09-21 NOTE — FLOWSHEET NOTE
09/21/21 1339   Vital Signs   Temp 98.2 °F (36.8 °C)   Temp Source Oral   Pulse 113   Heart Rate Source Monitor   Resp 18   BP (!) 152/69   BP Location Right upper arm   Level of Consciousness Alert (0)   MEWS Score 3   Patient Currently in Pain Denies   Oxygen Therapy   SpO2 94 %   O2 Device Nasal cannula   O2 Flow Rate (L/min) 2 L/min     Patient returned to room from procedure in good spirits. Up in chair. VSS. Denies any complains of. Avina patent and drng light tea colored urine. Skin pink warm and dry. Will continue to monitor.

## 2021-09-21 NOTE — PROGRESS NOTES
CARDIOLOGY PROGRESS NOTE      Patient Name: Maria De Jesus Yang  Date of admission: 2021 12:11 PM  Admission Dx: Septicemia [facility]) [A41.9]  Acute respiratory failure with hypoxia ([facility] [address] [J96.01]  Acute on chronic respiratory failure with hypoxia ([facility] [address] [J96.21]  Pneumonia due to COVID-19 virus [U07.1, J12.82]  Reason for Consult:  Atrial fibrillation  Requesting Physician: Tawanna Medeiros MD  Primary Care physician: HANNAH Dalton - ALEX    Subjective:     Maria De Jesus Yang is a 61 y.o. patient with a prior medical history notable for tobacco use, who presented to the hospital 21 with complaints of 3 days of shortness of breath and productive cough, fevers, chills and GI symptoms. He was found to be in hypoxic respiratory failure with increased work of breathing.      Patient seen with pulm/Dr. Jens Guerrero who favors bacterial PNA. Combinations COVID-19/Flu testing negative as well as COVID-19 rapid and PCR. CT chest showed scattered GGO's, emphysema and RUL nodule. Initial Required mechanical ventilation from -.      Cardiology consulted for AF management. Remains on 2L oxygen. Remains sweaty/hot at rest. Feels breathing heavy at rest. Worked with PT today who states he did decent workload. No chest pain/palpitations.      Drinks 6 PPD. Smokes 1/2 PPD. No illicit drug use he states. Mom with recent CVA 79 yo. Father  in 80's, cancer.        Home Medications:  Were reviewed and are listed in nursing record and/or below  Prior to Admission medications    Not on File        CURRENT Medications:  dilTIAZem (CARDIZEM) tablet 30 mg, 4 times per day  insulin lispro (HUMALOG) injection vial 0-12 Units, TID WC  insulin lispro (HUMALOG) injection vial 0-6 Units, Nightly  predniSONE (DELTASONE) tablet 30 mg, Daily  valsartan (DIOVAN) tablet 40 mg, Daily  amiodarone (CORDARONE) tablet 400 mg, Daily  metoprolol succinate (TOPROL XL) extended release tablet 50 mg, BID  ipratropium-albuterol (DUONEB) 2L    Head:  Normocephalic, atraumatic   Eyes:  Conjunctiva/corneas clear, anicteric sclerae    Nose: Nares normal, no drainage or sinus tenderness   Throat: No abnormalities of the lips, oral mucosa or tongue. Neck: Trachea midline. Neck supple with no lymphadenopathy, thyroid not enlarged, symmetric, no tenderness/mass/nodules, no Jugular venous pressure elevation    Lungs:   Clear to auscultation bilaterally, no wheezes, no rales, no respiratory distress   Chest Wall:  No deformity or tenderness to palpation   Heart:  Irregularly irregular, variable intensity S1, normal S2, no murmur, no rub, no S3/S4, PMI non-displaced. Abdomen:   Soft, non-tender, with normoactive bowel sounds. No masses, no hepatosplenomegaly   Extremities: No cyanosis, clubbing or pitting edema. Vascular: 2+ radial, dec dorsalis pedis and posterior tibial pulses bilaterally. Brisk carotid upstrokes without carotid bruit. Skin: Skin color, texture, turgor are normal with no rashes or ulceration. Pysch: Euthymic mood, appropriate affect   Neurologic: Oriented to person, place and time. No slurred speech or facial asymmetry. No motor or sensory deficits on gross examination.          Labs:   CBC:   Lab Results   Component Value Date    WBC 9.4 09/20/2021    RBC 5.02 09/20/2021    HGB 16.1 09/20/2021    HCT 48.7 09/20/2021    MCV 97.0 09/20/2021    RDW 13.8 09/20/2021     09/20/2021     CMP:  Lab Results   Component Value Date     09/20/2021    K 4.5 09/20/2021     09/20/2021    CO2 31 09/20/2021    BUN 27 09/20/2021    CREATININE 0.6 09/20/2021    GFRAA >60 09/20/2021    AGRATIO 1.0 09/20/2021    LABGLOM >60 09/20/2021    GLUCOSE 142 09/20/2021    PROT 6.7 09/20/2021    CALCIUM 8.6 09/20/2021    BILITOT 0.7 09/20/2021    ALKPHOS 63 09/20/2021    AST 57 09/20/2021    ALT 68 09/20/2021     PT/INR:  No results found for: PTINR  HgBA1c:  Lab Results   Component Value Date    LABA1C 5.4 09/15/2021     Lab Results Component Value Date    TROPONINI <0.01 09/14/2021         Interval Testing/Data:     Telemetry personally reviewed: AF  bpm.     ECHO 9/14/2021:     Summary   Right ventricular systolic function is mildly to moderately reduced .   The right atrium is mildly dilated.   Mild posterior mitral annular calcification is present.   Aortic valve sclerosis without aortic stenosis.   Normal systolic pulmonary artery pressure (SPAP) estimated at 33 mmHg (RA pressure 15 mmHg).   Left ventricular systolic function is moderately reduced with ejection fraction estimated at 35-40 %.   There is moderate global hypokinesis present.   Left ventricle size is normal.   There is mild concentric left ventricular hypertrophy.   Elevated left ventricular diastolic filling pressure: Septal E/e'' = 12.2 . Impression and Plan      1. Atrial fibrillation with RVR - rate control approach   2. Cardiomyopathy with BiV dysfunction, moderate LV dysfunction, undifferentiated  3. Acute hypoxic resp failure   4. Multifocal Pneumonia, undifferentiated, COVID-19 testing negative to date  11. COPD  6. Sepsis, resolved  7. Lung lesion, 2.4 x 1.5, CT 3 months recommended    [id number] Score for Atrial Fibrillation Stroke Risk   Risk   Factors  Component Value   C CHF No 0   H HTN Yes 1   A2 Age >= 76 No,  (64 y.o.) 0   D DM No 0   S2 Prior Stroke/TIA No 0   V Vascular Disease No 0   A Age 74-69 No,  (64 y.o.) 0   Sc Sex male 0    [id number]  Score  1   Score last updated 9/21/21 5:85 AM EDT    Click here for a link to the UpToDate guideline \"Atrial Fibrillation: Anticoagulation therapy to prevent embolization    Disclaimer: Risk Score calculation is dependent on accuracy of patient problem list and past encounter diagnosis.         Patient Active Problem List   Diagnosis    Acute respiratory failure with hypoxia (HCC)    Multifocal pneumonia    Suspected COVID-19 virus infection    [facility]emia ([facility] [address]    Acute on chronic respiratory failure with hypoxia ([facility] [address]    Pneumonia due to COVID-19 virus    Atrial fibrillation with RVR (HCC)    Cardiomyopathy ([facility] [address]    Biventricular heart failure (HCC)    Essential hypertension       PLAN:  1. Diltiazem discontinued in setting of LV dysfunction; Metoprolol inc from 50 mg XL BID to 150 mg once daily starting today. 2. Continue amiodarone 400 mg daily   3. Continue toprol XL for GDMT. Continue 40 mg valsartan daily. Potassium high normal. Add hydralazine (consider isordil eventually in addition) for BP/GDMT. 4. Continues on lovenox for [facility]    Ok for rest study today; stress tomorrow following re-evaluation for further risk stratification. Possible that cardiomyopathy may be related to ETOH, arrhythmia. Smoker, coronary calcification by CT chest.     I will address the patient's cardiac risk factors and adjusted pharmacologic treatment as needed. In addition, I have reinforced the need for patient directed risk factor modification. All questions and concerns were addressed to the patient/family. Alternatives to my treatment were discussed. Thank you for allowing us to participate in the care of [employer]. Please call me with any questions [phone].     Elisha Jones MD, [facility]  Cardiovascular Disease  [facility]  [phone] [facility]  [phone] [address] [place] [address]  9/21/2021 5:33 AM

## 2021-09-21 NOTE — PROGRESS NOTES
Inpatient Occupational Therapy  Evaluation and Treatment    Unit: 2 [place]  Date:  9/21/2021  Patient Name:    Jaswinder Cuellar  Admitting diagnosis:  Septicemia [facility]) [A41.9]  Acute respiratory failure with hypoxia ([facility] [address] [J96.01]  Acute on chronic respiratory failure with hypoxia ([facility] [address] [J96.21]  Pneumonia due to COVID-19 virus [U07.1, J12.82]  Admit Date:  9/14/2021  Precautions/Restrictions/WB Status/ Lines/ Wounds/ Oxygen: Fall risk, Bed/chair alarm, Lines -IV and Supplemental O2 (2), Telemetry and Continuous pulse oximetry  Intubated on 9-14-21 in ICU- extubated on 9-18-21  Treatment Time:  815-920  Treatment Number: 1   Timed code treatment minutes 55 minutes   Total Treatment minutes:   65  minutes    Patient Goals for Therapy:  \" go home \"      Discharge Recommendations: Home 24 hr assist  and Home OT  DME needs for discharge: Shower Chair       Therapy recommendations for staff:   Assist of 1 with use of rolling walker (RW) for all transfers to/from [facility]  to/from chair    History of Present Illness: H&P per domitila REYNOSO   61 y.o. male with PMH below, presents with SOB/BROWN, resp distress, productive cough, hypoxia, fever, chills, diarrhea, nausea. Patient presented to the ER in respiratory distress and satting 88% on room air. He is not normally on O2. He has been requiring 3 to 4 L of O2 to maintain sats. Blood gas was obtained and he is now being started on BiPAP secondary to respiratory acidosis and hypercapnia. He denies history of lung problems. He is currently a smoker and has been long-term. Strongly suspect some underlying lung pathology. He has had associated productive cough, nausea, diarrhea, fever and chills. Symptoms started 3 days ago. No additional history available at this time. Home Health S4 Level Recommendation:  Level 1 Standard  AM-PAC Score: 19    Preadmission Environment    Pt.  Lives with family (brother and his wife )  Home environment:  mobile home/trailer  Steps to enter first seen for Occupational therapy evaluation in acute care setting. Pt demonstrated decreased Activity tolerance, balance, transfers, strength . Pt functioning below baseline and will likely benefit from skilled occupational therapy services to maximize safety and independence. Goal(s) : To be met in 3 Visits:  1). Bed to toilet/BSC: SBA with AD as needed     To be met in 5 Visits:  1). Supine to/from Sit:  Supervision  2). Upper Body Bathing:   Independent  3). Lower Body Bathing:   CGA  4). Upper Body Dressing:  Independent  5). Lower Body Dressing:  Min A   6). Pt to demonstrate UE exs x 15 reps with minimal cues    Rehabilitation Potential:  Good for goals listed above. Strengths for achieving goals include: Pt cooperative  Barriers to achieving goals include:  Complexity of condition     Plan: To be seen 3-5 x/wk while in acute care setting for therapeutic exercises, bed mobility, transfers, dressing, bathing, family/patient education, ADL/IADL retraining, energy conservation training.      Billy Mooney OTR/L [id number]          If patient discharges from this facility prior to next visit, this note will serve as the Discharge Summary

## 2021-09-21 NOTE — PROGRESS NOTES
Comprehensive Nutrition Assessment    Type and Reason for Visit:  Reassess    Nutrition Recommendations/Plan:   1. Continue ADULT DIET; Regular diet  2. Continue NPO at midnight and monitor nutrition progression  3. Monitor appetite and po intake. 4. Monitor weight trends, bowel function/diarrhea, nutrition related lab values, respiratory status and POC. Nutrition Assessment:  patient is improving from a nutritional standpoint AEB pt was extubated on 9/18 and diet advanced to po diet, however patient remains at risk for further compromise d/t ongoing respiratory dysfunction r/t acute resp failure + multifocal PNA, inadequate energy intake x 7 days admission, altered nutrition related lab values, and NPO at midnight for stress test tomorrow; Will continue ADULT DIET; Regular (wih no caffeine x 24 hours prior to stress test tomorrow) and NPO starting tonight at midnight for stress test tomorrow    Malnutrition Assessment:  Malnutrition Status: At risk for malnutrition (Comment)    Context:  Acute Illness     Findings of the 6 clinical characteristics of malnutrition:  Energy Intake:  1 - 75% or less of estimated energy requirements for 7 or more days (unsure if TF was ever started on 9/17, poor po intake since 9/18)  Weight Loss:  Unable to assess (d/t fluid accumulation)     Body Fat Loss:  No significant body fat loss     Muscle Mass Loss:  No significant muscle mass loss    Fluid Accumulation:  7 - Moderate to Severe Generalized, Extremities (generalized nonpitting trace edema, BUE +2 edema)   Strength:  Not Performed    Estimated Daily Nutrient Needs:  Energy (kcal):  9811-7568 kcals based on 15-18 kcals/kg/CBW; Weight Used for Energy Requirements:  Current     Protein (g):   g protein based on 1.2-1.4 g/kg/IBW;  Weight Used for Protein Requirements:  Ideal        Fluid (ml/day):  9799-4342 ml; Method Used for Fluid Requirements:  1 ml/kcal      Nutrition Related Findings:  met with patient at bedside; noted COVID-19 not detected with this patient; patient is alert and oriented x 4; pt reports no loss of appetite or po intake or weight loss PTA; reports he has been eating \"not too bad\" since being extubated however his throat is sore; would like to hold off on ONS at this time, will revisit during follow up assessment; reports his UBW is around 240#; pt reported +diarrhea today; no difficulty swallow, however some difficulty chewing d/t poor dentition; pt with dinner tray on bedside + pt about to eat dinner as this RD left room; patient was extubated on 9/18; currently on 2L NC; TF recs were put in on 9/16, however unsure if TF was ever started, + pt consuming only 1-25% x 2 documented meals since extubation; Pt is NPO tonight at midnight for stress test tomorrow; Generlized non pitting trace edema, +2 BUE edema; BUN, Bg and ALT/AST are elevated; Cr is low; I/O's +7.9 L = ~17.4#; patient has amiodarone, lovenox, apresoline, humalog, metoprolol succinate, protonix, prednisone, and valsartan ordered at this time; Wounds:  None       Current Nutrition Therapies:    Diet NPO Exceptions are: Ice Chips, Sips of Water with Meds  Diet NPO Exceptions are: [employer], Sips of Water with Meds  ADULT DIET;  Regular    Anthropometric Measures:  · Height: 5' 10\" (177.8 cm)  · Current Body Weight: 257 lb (116.6 kg) (obtained 9/21/21; actual weight)   · Admission Body Weight: 249 lb (112.9 kg) (obtained on 9/15/21; actual weight)    · Usual Body Weight: 249 lb (112.9 kg) (obtained on 9/15/21; actual weight)     · Ideal Body Weight: 166 lbs; % Ideal Body Weight 154.8 %   · BMI: 36.9  · BMI Categories: Obese Class 2 (BMI 35.0 -39.9)       Nutrition Diagnosis:   · Inadequate oral intake related to inadequate protein-energy intake, impaired respiratory function, increase demand for energy/nutrients as evidenced by NPO or clear liquid status due to medical condition, intake 0-25%, lab values, localized or generalized fluid accumulation, intubation, nutrition support - enteral nutrition, other (comment), diarrhea (acute resp failure + multifocal PNA)    Nutrition Interventions:   Food and/or Nutrient Delivery:  Continue Current Diet (continue NPO at midnight)  Nutrition Education/Counseling:  No recommendation at this time   Coordination of Nutrition Care:  Continue to monitor while inpatient    Goals:  patient will consume 50% or greater of meals on ADULT DIET; Regular; pt will adhere to NPO status at midnight for stress test tomorrow until medically cleared to receive nutrition therapy       Nutrition Monitoring and Evaluation:   Behavioral-Environmental Outcomes:  None Identified   Food/Nutrient Intake Outcomes:  Food and Nutrient Intake, Supplement Intake, Diet Advancement/Tolerance  Physical Signs/Symptoms Outcomes:  Biochemical Data, GI Status, Fluid Status or Edema, Nutrition Focused Physical Findings, Skin, Weight     Discharge Planning:     Too soon to determine     Electronically signed by Meaghan Rodriguez RD, LD on 9/21/21 at 5:29 PM EDT    Contact: [phone]

## 2021-09-21 NOTE — PROGRESS NOTES
Shift assessment complete; see flow sheet. Scheduled medications administered; See MAR. Pt has hx of afib w/rvr Pt tolerating oral cardizem well. Will continue to monitor  IV infusing without difficulty. O2 2 LPM nc in place. Pt no c/o  pain 0/10 no PRN  given at this time. Pt denies any needs at this time. Call light within reach, bed in low locked position, bed alarm on.

## 2021-09-21 NOTE — FLOWSHEET NOTE
09/21/21 0830   Vital Signs   Temp 98 °F (36.7 °C)   Temp Source Oral   Pulse 79   Heart Rate Source Monitor   Resp 18   BP (!) 149/82   BP Location Right upper arm   Level of Consciousness Alert (0)   MEWS Score 1   Patient Currently in Pain Denies   Oxygen Therapy   SpO2 96 %   O2 Device Nasal cannula   O2 Flow Rate (L/min) 2 L/min     Patient alert and oriented, Weak. Voices no complains of. Education given concerning diet change today to prepare for stress test tomorrow, no caffeine x 24 hours and NPO after midnight patient expressed understanding. VSS. PT/OT here to work with patient. Will continue to monitor.

## 2021-09-21 NOTE — PLAN OF CARE
Nutrition Problem #1: Inadequate oral intake  Intervention: Food and/or Nutrient Delivery: Continue Current Diet (continue NPO at midnight)  Nutritional Goals: patient will consume 50% or greater of meals on ADULT DIET;  Regular; pt will adhere to NPO status at midnight for stress test tomorrow until medically cleared to receive nutrition therapy

## 2021-09-21 NOTE — PLAN OF CARE
Problem: Nutrition  Goal: Optimal nutrition therapy  Outcome: Ongoing  Goal: Understanding of nutritional guidelines  Outcome: Ongoing     Problem: Falls - Risk of:  Goal: Will remain free from falls  Outcome: Ongoing  Goal: Absence of physical injury  Outcome: Ongoing     Problem: Skin Integrity:  Goal: Will show no infection signs and symptoms  Outcome: Ongoing  Goal: Absence of new skin breakdown  Outcome: Ongoing     Problem: Infection:  Goal: Will remain free from infection  Outcome: Ongoing     Problem: Safety:  Goal: Free from accidental physical injury  Outcome: Ongoing  Goal: Free from intentional harm  Outcome: Ongoing     Problem: Daily Care:  Goal: Daily care needs are met  Outcome: Ongoing     Problem: Pain:  Goal: Patient's pain/discomfort is manageable  Outcome: Ongoing     Problem: Skin Integrity:  Goal: Skin integrity will stabilize  Outcome: Ongoing     Problem: Discharge Planning:  Goal: Patients continuum of care needs are met  Outcome: Ongoing

## 2021-09-21 NOTE — PROGRESS NOTES
Patient admitted to room _219___ from PCU. Patient oriented to room, call light, bed rails, phone, lights and bathroom. Patient instructed about the schedule of the day including: vital sign frequency, lab draws, possible tests, frequency of MD and staff rounds, daily weights, I &O's and prescribed diet. Bed alarm deferred patient low fall risk and refuses alarm. patient aware of placement and reason. Bed locked, in lowest position, side rails up 2/4, call light within reach. Recliner Assessment:     Patient is able to demonstrate the ability to move from a reclining position to an upright position within the recliner. 4 Eyes Skin Assessment     The patient is being assess for   Transfer to new unit    I agree that 2 RN's have performed a thorough Head to Toe Skin Assessment on the patient. ALL assessment sites listed below have been assessed. Areas assessed for pressure by both nurses:   [x]   Head, Face, and Ears   [x]   Shoulders, Back, and Chest, Abdomen  [x]   Arms, Elbows, and Hands   [x]   Coccyx, Sacrum, and Ischium  [x]   Legs, Feet, and Heels        Skin Assessed Under all Medical Devices by both nurses:  O2 device tubing              All Mepilex Borders were peeled back and area peeked at by both nurses:  No: none  Please list where Mepilex Borders are located:             **SHARE this note so that the co-signing nurse is able to place an eSignature**    Co-signer eSignature: {Esignature:[id number]}    Does the Patient have Skin Breakdown related to pressure?   No     (Insert Photo here         Pieter Prevention initiated:  no  Wound Care Orders initiated:  No      [facility] nurse consulted for Pressure Injury (Stage 3,4, Unstageable, DTI, NWPT, Complex wounds)and New or Established Ostomies:  No      Primary Nurse eSignature: Electronically signed by Danilo Gallegos RN on 9/21/21 at 5:47 AM EDT

## 2021-09-21 NOTE — PROGRESS NOTES
Hospitalist Progress Note      PCP: HANNAH Laws NP    Date of Admission: 9/14/2021    resp failure, was on vent support , Afibb with RVR      Subjective:     He is feeling better. He is 100% on 2 L and the NC was not in his nose but on the side. Has a cough. No chest pain. He is afebrile. Has some dyspnea but better. No nausea or emesis. Medications:  Reviewed    Infusion Medications    dextrose      sodium chloride       Scheduled Medications    metoprolol succinate  150 mg Oral Daily    insulin lispro  0-12 Units SubCUTAneous TID WC    insulin lispro  0-6 Units SubCUTAneous Nightly    predniSONE  30 mg Oral Daily    valsartan  40 mg Oral Daily    amiodarone  400 mg Oral Daily    albuterol sulfate HFA  2 puff Inhalation 4x daily    pantoprazole  40 mg IntraVENous Daily    enoxaparin  1 mg/kg SubCUTAneous BID    nicotine  1 patch TransDERmal Daily    sodium chloride flush  5-40 mL IntraVENous 2 times per day     PRN Meds: ipratropium-albuterol, perflutren lipid microspheres, glucose, dextrose, glucagon (rDNA), dextrose, polyethylene glycol, acetaminophen **OR** acetaminophen, guaiFENesin-dextromethorphan, prochlorperazine, sodium chloride flush, sodium chloride, LORazepam **OR** LORazepam **OR** LORazepam **OR** LORazepam **OR** LORazepam **OR** LORazepam **OR** LORazepam **OR** LORazepam      Intake/Output Summary (Last 24 hours) at 9/21/2021 0710  Last data filed at 9/21/2021 0200  Gross per 24 hour   Intake 1073.49 ml   Output 1450 ml   Net -376.51 ml       Physical Exam Performed:    BP (!) 160/87   Pulse 84   Temp 98.4 °F (36.9 °C) (Oral)   Resp 20   Ht 5' 10\" (1.778 m)   Wt 257 lb (116.6 kg)   SpO2 96%   BMI 36.88 kg/m²           General: middle aged male, up in bed   Awake, alert and oriented. Appears to be not in any distress  Mucous Membranes:  Pink , anicteric  Neck: No JVD, no carotid bruit, no thyromegaly  Chest: diminished BS. Few crackles.  No wheezes  Cardiovascular: irregular  S1S2 heard, no murmurs or gallops  Abdomen:  Soft, obese, undistended, non tender, no organomegaly, BS present  Extremities: No edema or cyanosis. Distal pulses well felt  Neurological : grossly normal      Labs:   Recent Labs     09/19/21  0409 09/20/21  0408 09/21/21  0624   WBC 7.2 9.4 10.9   HGB 16.0 16.1 16.0   HCT 48.9 48.7 48.1    276 257     Recent Labs     09/19/21  0409 09/20/21  0408    142   K 3.9 4.5    100   CO2 29 31   BUN 32* 27*   CREATININE 0.7* 0.6*   CALCIUM 8.2* 8.6     Recent Labs     09/19/21  0409 09/20/21  0408   AST 46* 57*   ALT 51* 68*   BILITOT 0.4 0.7   ALKPHOS 59 63     No results for input(s): INR in the last 72 hours. No results for input(s): Shellia Bugler in the last 72 hours. Urinalysis:      Lab Results   Component Value Date    NITRU Negative 09/15/2021    WBCUA 0-2 09/15/2021    BACTERIA 1+ 09/15/2021    RBCUA 0-2 09/15/2021    BLOODU MODERATE 09/15/2021    SPECGRAV 1.025 09/15/2021    GLUCOSEU Negative 09/15/2021       Radiology:  XR CHEST PORTABLE   Final Result   Interval extubation. The lungs appear clear. XR CHEST PORTABLE   Final Result   Improving aeration in the lungs with minimal bibasilar opacities remaining. XR CHEST PORTABLE   Final Result   No substantial interval change in mild bibasilar airspace disease as compared   to prior. XR CHEST PORTABLE   Final Result   Improving aeration of the lungs. XR CHEST PORTABLE   Final Result   Lines and tubes as described. Multifocal airspace disease appears similar to prior exam.         CT CHEST PULMONARY EMBOLISM W CONTRAST   Final Result   No evidence of pulmonary embolism. COVID-19 pneumonia as described above. Irregular right upper lobe mass as measured above. RECOMMENDATIONS:   Chest CT in 3 months is recommended for follow-up evaluation of right upper   lobe irregular lesion.          XR CHEST PORTABLE

## 2021-09-21 NOTE — PROGRESS NOTES
LORazepam    Results:  CBC:   Recent Labs     09/19/21  0409 09/20/21  0408 09/21/21  0624   WBC 7.2 9.4 10.9   HGB 16.0 16.1 16.0   HCT 48.9 48.7 48.1   MCV 97.7 97.0 97.3    276 257     BMP:   Recent Labs     09/19/21  0409 09/20/21  0408 09/21/21  0624    142 142   K 3.9 4.5 4.9    100 102   CO2 29 31 33*   BUN 32* 27* 26*   CREATININE 0.7* 0.6* 0.6*     LIVER PROFILE:   Recent Labs     09/19/21  0409 09/20/21  0408 09/21/21  0624   AST 46* 57* 73*   ALT 51* 68* 91*   BILITOT 0.4 0.7 0.7   ALKPHOS 59 63 63       Cultures:  9/14/2021 SARS-CoV-2 NAAT and PCR negative  9/14/2021 blood NGTD  9/15/2021 urine antigens are negative  9/15/2021 tracheal aspirate strep pneumonia    Films:  9/14/21 CTPA   Pulmonary Arteries: Pulmonary arteries are adequately opacified for   evaluation.  No evidence of intraluminal filling defect to suggest pulmonary   embolism.  Main pulmonary artery is normal in caliber.       Mediastinum: The central airways are clear.  Right hilar lymphadenopathy   measuring 2.3 x 1.1 cm (2, 135).  The heart and pericardium demonstrate no   acute abnormality.  Atherosclerotic disease of the thoracic aorta.       Lungs/pleura: Irregular right upper lobe nodule measures 2.4 x 1.5 cm (2,   51).  Right lower lobe consolidation and scattered bilateral ground-glass   opacities, consistent with COVID-19 pneumonia.  Panlobular emphysema.  No   pleural effusion or pneumothorax.       Upper Abdomen: Right adrenal gland nodule measuring 1.6 cm.  Left adrenal   gland hyperplasia.  Rest of visualized upper abdomen is unremarkable.       Soft Tissues/Bones: Degenerate disease of the thoracic spine.  No focal   osseous lesion.  Visualized soft tissues are unremarkable.           Impression   No evidence of pulmonary embolism.       COVID-19 pneumonia as described above.       Irregular right upper lobe mass as measured above.      CXR 9/18/2021 ET tube okay     ASSESSMENT:  · Acute hypoxic and

## 2021-09-22 ENCOUNTER — APPOINTMENT (OUTPATIENT)
Dept: NUCLEAR MEDICINE | Age: 66
DRG: 720 | End: 2021-09-22
Payer: COMMERCIAL

## 2021-09-22 LAB
A/G RATIO: 1.1 (ref 1.1–2.2)
ALBUMIN SERPL-MCNC: 3.1 G/DL (ref 3.4–5)
ALP BLD-CCNC: 65 U/L (ref 40–129)
ALT SERPL-CCNC: 151 U/L (ref 10–40)
ANION GAP SERPL CALCULATED.3IONS-SCNC: 8 MMOL/L (ref 3–16)
AST SERPL-CCNC: 77 U/L (ref 15–37)
BASOPHILS ABSOLUTE: 0 K/UL (ref 0–0.2)
BASOPHILS RELATIVE PERCENT: 0.2 %
BILIRUB SERPL-MCNC: 1.1 MG/DL (ref 0–1)
BUN BLDV-MCNC: 26 MG/DL (ref 7–20)
CALCIUM SERPL-MCNC: 8.7 MG/DL (ref 8.3–10.6)
CHLORIDE BLD-SCNC: 99 MMOL/L (ref 99–110)
CO2: 33 MMOL/L (ref 21–32)
CREAT SERPL-MCNC: 0.6 MG/DL (ref 0.8–1.3)
EOSINOPHILS ABSOLUTE: 0 K/UL (ref 0–0.6)
EOSINOPHILS RELATIVE PERCENT: 0.1 %
GFR AFRICAN AMERICAN: >60
GFR NON-AFRICAN AMERICAN: >60
GLOBULIN: 2.9 G/DL
GLUCOSE BLD-MCNC: 101 MG/DL (ref 70–99)
GLUCOSE BLD-MCNC: 107 MG/DL (ref 70–99)
GLUCOSE BLD-MCNC: 110 MG/DL (ref 70–99)
GLUCOSE BLD-MCNC: 126 MG/DL (ref 70–99)
GLUCOSE BLD-MCNC: 165 MG/DL (ref 70–99)
GLUCOSE BLD-MCNC: 82 MG/DL (ref 70–99)
HCT VFR BLD CALC: 48.5 % (ref 40.5–52.5)
HEMOGLOBIN: 15.7 G/DL (ref 13.5–17.5)
LYMPHOCYTES ABSOLUTE: 0.9 K/UL (ref 1–5.1)
LYMPHOCYTES RELATIVE PERCENT: 8.1 %
MCH RBC QN AUTO: 31.6 PG (ref 26–34)
MCHC RBC AUTO-ENTMCNC: 32.3 G/DL (ref 31–36)
MCV RBC AUTO: 97.9 FL (ref 80–100)
MONOCYTES ABSOLUTE: 1 K/UL (ref 0–1.3)
MONOCYTES RELATIVE PERCENT: 8.4 %
NEUTROPHILS ABSOLUTE: 9.6 K/UL (ref 1.7–7.7)
NEUTROPHILS RELATIVE PERCENT: 83.2 %
PDW BLD-RTO: 13.9 % (ref 12.4–15.4)
PERFORMED ON: ABNORMAL
PERFORMED ON: NORMAL
PLATELET # BLD: 257 K/UL (ref 135–450)
PMV BLD AUTO: 8.2 FL (ref 5–10.5)
POTASSIUM REFLEX MAGNESIUM: 4.2 MMOL/L (ref 3.5–5.1)
RBC # BLD: 4.95 M/UL (ref 4.2–5.9)
SODIUM BLD-SCNC: 140 MMOL/L (ref 136–145)
TOTAL PROTEIN: 6 G/DL (ref 6.4–8.2)
WBC # BLD: 11.6 K/UL (ref 4–11)

## 2021-09-22 PROCEDURE — C9113 INJ PANTOPRAZOLE SODIUM, VIA: HCPCS | Performed by: INTERNAL MEDICINE

## 2021-09-22 PROCEDURE — 99232 SBSQ HOSP IP/OBS MODERATE 35: CPT | Performed by: INTERNAL MEDICINE

## 2021-09-22 PROCEDURE — 85025 COMPLETE CBC W/AUTO DIFF WBC: CPT

## 2021-09-22 PROCEDURE — 3430000000 HC RX DIAGNOSTIC RADIOPHARMACEUTICAL: Performed by: INTERNAL MEDICINE

## 2021-09-22 PROCEDURE — 6360000002 HC RX W HCPCS: Performed by: INTERNAL MEDICINE

## 2021-09-22 PROCEDURE — A9502 TC99M TETROFOSMIN: HCPCS | Performed by: INTERNAL MEDICINE

## 2021-09-22 PROCEDURE — 6370000000 HC RX 637 (ALT 250 FOR IP): Performed by: NURSE PRACTITIONER

## 2021-09-22 PROCEDURE — 94640 AIRWAY INHALATION TREATMENT: CPT

## 2021-09-22 PROCEDURE — 6370000000 HC RX 637 (ALT 250 FOR IP): Performed by: INTERNAL MEDICINE

## 2021-09-22 PROCEDURE — 1200000000 HC SEMI PRIVATE

## 2021-09-22 PROCEDURE — 36415 COLL VENOUS BLD VENIPUNCTURE: CPT

## 2021-09-22 PROCEDURE — 2580000003 HC RX 258: Performed by: INTERNAL MEDICINE

## 2021-09-22 PROCEDURE — 94761 N-INVAS EAR/PLS OXIMETRY MLT: CPT

## 2021-09-22 PROCEDURE — 93017 CV STRESS TEST TRACING ONLY: CPT

## 2021-09-22 PROCEDURE — 80053 COMPREHEN METABOLIC PANEL: CPT

## 2021-09-22 PROCEDURE — 99233 SBSQ HOSP IP/OBS HIGH 50: CPT | Performed by: INTERNAL MEDICINE

## 2021-09-22 PROCEDURE — 2700000000 HC OXYGEN THERAPY PER DAY

## 2021-09-22 PROCEDURE — 97535 SELF CARE MNGMENT TRAINING: CPT

## 2021-09-22 RX ORDER — AMIODARONE HYDROCHLORIDE 200 MG/1
200 TABLET ORAL DAILY
Status: DISCONTINUED | OUTPATIENT
Start: 2021-09-23 | End: 2021-09-23 | Stop reason: HOSPADM

## 2021-09-22 RX ORDER — VALSARTAN 80 MG/1
80 TABLET ORAL DAILY
Status: DISCONTINUED | OUTPATIENT
Start: 2021-09-23 | End: 2021-09-23 | Stop reason: HOSPADM

## 2021-09-22 RX ORDER — ALBUTEROL SULFATE 90 UG/1
2 AEROSOL, METERED RESPIRATORY (INHALATION) 3 TIMES DAILY
Status: DISCONTINUED | OUTPATIENT
Start: 2021-09-22 | End: 2021-09-23 | Stop reason: HOSPADM

## 2021-09-22 RX ORDER — PREDNISONE 20 MG/1
20 TABLET ORAL DAILY
Status: DISCONTINUED | OUTPATIENT
Start: 2021-09-22 | End: 2021-09-23 | Stop reason: HOSPADM

## 2021-09-22 RX ADMIN — METOPROLOL SUCCINATE 150 MG: 50 TABLET, EXTENDED RELEASE ORAL at 09:54

## 2021-09-22 RX ADMIN — HYDRALAZINE HYDROCHLORIDE 25 MG: 25 TABLET, FILM COATED ORAL at 13:54

## 2021-09-22 RX ADMIN — AMIODARONE HYDROCHLORIDE 400 MG: 200 TABLET ORAL at 09:55

## 2021-09-22 RX ADMIN — Medication 2 PUFF: at 15:42

## 2021-09-22 RX ADMIN — ENOXAPARIN SODIUM 105 MG: 150 INJECTION SUBCUTANEOUS at 23:07

## 2021-09-22 RX ADMIN — SODIUM CHLORIDE, PRESERVATIVE FREE 10 ML: 5 INJECTION INTRAVENOUS at 09:56

## 2021-09-22 RX ADMIN — Medication 2 PUFF: at 19:34

## 2021-09-22 RX ADMIN — TETROFOSMIN 32.9 MILLICURIE: 1.38 INJECTION, POWDER, LYOPHILIZED, FOR SOLUTION INTRAVENOUS at 08:25

## 2021-09-22 RX ADMIN — REGADENOSON 0.4 MG: 0.08 INJECTION, SOLUTION INTRAVENOUS at 08:25

## 2021-09-22 RX ADMIN — PREDNISONE 20 MG: 20 TABLET ORAL at 09:55

## 2021-09-22 RX ADMIN — VALSARTAN 40 MG: 80 TABLET, FILM COATED ORAL at 09:55

## 2021-09-22 RX ADMIN — SODIUM CHLORIDE, PRESERVATIVE FREE 10 ML: 5 INJECTION INTRAVENOUS at 23:16

## 2021-09-22 RX ADMIN — HYDRALAZINE HYDROCHLORIDE 25 MG: 25 TABLET, FILM COATED ORAL at 06:55

## 2021-09-22 RX ADMIN — PANTOPRAZOLE SODIUM 40 MG: 40 INJECTION, POWDER, FOR SOLUTION INTRAVENOUS at 09:56

## 2021-09-22 RX ADMIN — Medication 2 PUFF: at 07:56

## 2021-09-22 RX ADMIN — ENOXAPARIN SODIUM 105 MG: 150 INJECTION SUBCUTANEOUS at 09:55

## 2021-09-22 NOTE — PROGRESS NOTES
Avina removed as ordered without difficulty. 500 ml of tea colored urine in bag.  Patient given urinal.

## 2021-09-22 NOTE — PROGRESS NOTES
RESPIRATORY THERAPY ASSESSMENT    Name:  José Antonio Novak  Medical Record Number:  [id number]  Age: 61 y.o. Gender: male  : 1955  Today's Date:  2021  Room:  [facility]0219-01    Assessment     Is the patient being admitted for a COPD or Asthma exacerbation? No   (If yes the patient will be seen every 4 hours for the first 24 hours and then reassessed)    Patient Admission Diagnosis      Allergies  No Known Allergies    Minimum Predicted Vital Capacity:               Actual Vital Capacity:                    Pulmonary History:No history  Home Oxygen Therapy:  room air  Home Respiratory Therapy:Albuterol   Current Respiratory Therapy:  Albuterol QID , duoneb prn    Treatment Type: MDI  Medications: Albuterol    Respiratory Severity Index(RSI)   Patients with orders for inhalation medications, oxygen, or any therapeutic treatment modality will be placed on Respiratory Protocol. They will be assessed with the first treatment and at least every 72 hours thereafter. The following severity scale will be used to determine frequency of treatment intervention. Smoking History: Pulmonary Disease or Smoking History, Greater than 15 pack year = 2    Social History  Social History     Tobacco Use    Smoking status: Current Every Day Smoker   Substance Use Topics    Alcohol use: Not on file    Drug use: Not on file       Recent Surgical History: None = 0  Past Surgical History  History reviewed. No pertinent surgical history. Level of Consciousness: Alert, Oriented, and Cooperative = 0    Level of Activity: Mostly sedentary, minimal walking = 2    Respiratory Pattern: Regular Pattern; RR 8-20 = 0    Breath Sounds: Diminshed bilaterally and/or crackles = 2    Sputum  Sputum Color: White, Tenacity:  Thick, Sputum How Obtained: Cough on request  Cough: Strong, spontaneous, non-productive = 0    Vital Signs   /66   Pulse 93   Temp 98.2 °F (36.8 °C) (Oral)   Resp 18   Ht 5' 10\" (1.778 m)   Wt 257 lb (116.6 kg)   SpO2 93%   BMI 36.88 kg/m²   SPO2 (COPD values may differ): Greater than or equal to 92% on room air = 0    Peak Flow (asthma only): not applicable = 0    RSI: 5-6 = Q4hr PRN (every four hours as needed) for dyspnea        Plan       Goals: medication delivery, mobilize retained secretions, volume expansion and improve oxygenation    Patient/caregiver was educated on the proper method of use for Respiratory Care Devices:  Yes      Level of patient/caregiver understanding able to:   ? Verbalize understanding   ? Demonstrate understanding       ? Teach back        ? Needs reinforcement       ? No available caregiver               ? Other:     Response to education:  Good     Is patient being placed on Home Treatment Regimen? Yes     Does the patient have everything they need prior to discharge? NA     Comments: pt assessed and chart reviewed    Plan of Care: change tx's to TID     Electronically signed by Molly Hayes on 9/22/2021 at 11:05 AM    Respiratory Protocol Guidelines     1. Assessment and treatment by Respiratory Therapy will be initiated for medication and therapeutic interventions upon initiation of aerosolized medication. 2. Physician will be contacted for respiratory rate (RR) greater than 35 breaths per minute. Therapy will be held for heart rate (HR) greater than 140 beats per minute, pending direction from physician. 3. Bronchodilators will be administered via Metered Dose Inhaler (MDI) with spacer when the following criteria are met:  a. Alert and cooperative     b. HR < 140 bpm  c. RR < 30 bpm                d. Can demonstrate a 2-3 second inspiratory hold  4. Bronchodilators will be administered via Hand Held Nebulizer [facility]) to patients when ANY of the following criteria are met  a. Incognizant or uncooperative          b. Patients treated with HHN at Home        c. Unable to demonstrate proper use of MDI with spacer     d. RR > 30 bpm   5.  Bronchodilators will be delivered via Metered Dose Inhaler (MDI), HHN, Aerogen to intubated patients on mechanical ventilation. 6. Inhalation medication orders will be delivered and/or substituted as outlined below. Aerosolized Medications Ordering and Administration Guidelines:    1. All Medications will be ordered by a physician, and their frequency and/or modality will be adjusted as defined by the patients Respiratory Severity Index (RSI) score. 2. If the patient does not have documented COPD, consider discontinuing anticholinergics when RSI is less than 9.  3. If the bronchospasm worsens (increased RSI), then the bronchodilator frequency can be increased to a maximum of every 4 hours. If greater than every 4 hours is required, the physician will be contacted. 4. If the bronchospasm improves, the frequency of the bronchodilator can be decreased, based on the patient's RSI, but not less than home treatment regimen frequency. 5. Bronchodilator(s) will be discontinued if patient has a RSI less than 9 and has received no scheduled or as needed treatment for 72  Hrs. Patients Ordered on a Mucolytic Agent:    1. Must always be administered with a bronchodilator. 2. Discontinue if patient experiences worsened bronchospasm, or secretions have lessened to the point that the patient is able to clear them with a cough. Anti-inflammatory and Combination Medications:    1. If the patient lacks prior history of lung disease, is not using inhaled anti-inflammatory medication at home, and lacks wheezing by examination or by history for at least 24 hours, contact physician for possible discontinuation.

## 2021-09-22 NOTE — CARE COORDINATION
INTERDISCIPLINARY PLAN OF CARE CONFERENCE    Date/Time: 9/22/2021 4:04 PM  Completed by: KAROL Barahona. Case Management      Patient Name:  Jeremiah Dyer  YOB: 1955  Admitting Diagnosis: Septicemia [facility]) [A41.9]  Acute respiratory failure with hypoxia ([facility] [address] [J96.01]  Acute on chronic respiratory failure with hypoxia ([facility] [address] [J96.21]  Pneumonia due to COVID-19 virus [U07.1, J12.82]     Admit Date/Time:  9/14/2021 12:11 PM    Chart reviewed. Interdisciplinary team contacted or reviewed plan related to patient progress and discharge plans. Disciplines included Case Management, Nursing, and Dietitian. Current Status:Ongoing   PT/OT recommendation for discharge plan of care: home with 24hr assist and home PT/OT. Rolling walker and shower chair    Expected D/C Disposition:  Home  Confirmed plan with patient and/or family Yes confirmed with: (name) pt  Met with:pt    Discharge Plan Comments: Chart review completed. Met with pt at bedside. Pt confirmed he plans on returning home on discharge. Discussed HHC with pt and pt stated he needs to think about this. Explained it would benefit him since he has been in the hospital for several days. He stated agreement and stated he would think about this. He stated that he has a shower chair at home but will confirm with his mom as she is staying with pt currently. Discussed the need for the [employer] and provided pt with a DME list; pt stated referral could be made to [facility] for the rolling walker. He denied needs or questions for CM.     Home O2 in place on admit: No

## 2021-09-22 NOTE — FLOWSHEET NOTE
09/22/21 0945   Vital Signs   Temp 98.2 °F (36.8 °C)   Temp Source Oral   Pulse 93   Heart Rate Source Monitor   Resp 18   /66   Level of Consciousness Alert (0)   MEWS Score 1   Patient Currently in Pain Denies   Oxygen Therapy   SpO2 93 %   O2 Device None (Room air)     Patient returned to room, alert and oriented. VSS. States feel better today, up in chair watching TV. Skin pink warm and dry. 96% on room air. Call bell with in reach.

## 2021-09-22 NOTE — PROGRESS NOTES
Occupational Therapy Daily Treatment Note    Unit: [address] [place]  Date:  9/22/2021  Patient Name:    Ross Kim  Admitting diagnosis:  Septicemia [facility]) [A41.9]  Acute respiratory failure with hypoxia ([facility] [address] [J96.01]  Acute on chronic respiratory failure with hypoxia ([facility] [address] [J96.21]  Pneumonia due to COVID-19 virus [U07.1, J12.82]  Admit Date:  9/14/2021  Precautions/Restrictions:  Fall risk, Bed/chair alarm, Lines -IV and Supplemental O2 (1L), Telemetry and Continuous pulse oximetry  Intubated on 9-14-21 in ICU- extubated on 9-18-21        Discharge Recommendations: Home with 24/7 assist and home therapy  DME needs for discharge: Shower Chair       Therapy recommendations for staff:   Assist of 1 (contact guard assist) with use of rolling walker (RW) for all ambulation to/from bathroom    AM-PAC Score: 18  Home Health S4 Level: Level 1- Standard       Treatment Time:  7006-7182  Treatment number:  2   Total Treatment Time: 49   minutes    History of Present Illness: H&P per domitila REYNOSO   61 y. o. male with PMH below, presents with SOB/BROWN, resp distress, productive cough, hypoxia, fever, chills, diarrhea, nausea.  Patient presented to the ER in respiratory distress and satting 88% on room air.  He is not normally on O2.  He has been requiring 3 to 4 L of O2 to maintain sats.  Blood gas was obtained and he is now being started on BiPAP secondary to respiratory acidosis and hypercapnia.  He denies history of lung problems.  He is currently a smoker and has been long-term.  Strongly suspect some underlying lung pathology. Edmary Bonilla has had associated productive cough, nausea, diarrhea, fever and chills.  Symptoms started 3 days ago.  No additional history available at this time. Subjective:  Pt was found on edge of seat of his chair, stating he needed to use the bathroom. Was receptive to OT treatment.     Pain   No  Rating: NA  Location:NA  Pain Medicine Status: No request made      Bed Mobility:   Supine to Sit:  Not Tested  Sit to Supine:  N/A  Rolling:           Not Tested  Scooting:        Supervision    Transfer Training:   Sit to stand:   CGA  Stand to sit:  CGA  Bed to Chair:  [facility]  Bed to [facility]:   Not Tested  Standard toilet:   Min A using grab bar    Activity Tolerance   Pt completed therapy session with Spo2 = 84%  Sitting on commode:  SpO2: 92% on RA    After ambulation from bathroom to chair:  SpO2: 84% on RA, improved to 91 within 30 secs. On 1L via NC  End of session:  SpO2: 95% on RA  HR:  62 bpm    ADL Training:   Upper body dressing:  SBA  Upper body bathing:  Supervision /setup  Lower body dressing:  Max A-assist to thread B feet into brief and to complete pulling them up. Lower body bathing:  Not Tested  Toileting: Max A-assist needed to clean after BM  Grooming/Hygiene:  Not Tested    Therapeutic Exercise:   N/A    Patient Education:   Recommendations for DC  Energy conservation techniques  Safe RW use/hand placement    Positioning Needs:   Up in chair, call light and needs in reach. Family Present:  No    Assessment: Pt tolerated session well. He did desaturate with ambulation to/from the bathroom but recovered quickly on 1L. Could benefit from SNF stay but pt declined. Wants to go home and reports he will have necessary assist there. Pt should continue to benefit from skilled OT tx to maximize independence so that he may eventually return home with the least amount of assistance. GOALS  To be met in 3 Visits:  1). Bed to toilet/BSC: SBA with AD as needed      To be met in 5 Visits:  1). Supine to/from Sit:             Supervision  2). Upper Body Bathing:         Independent  3). Lower Body Bathing:         CGA  4). Upper Body Dressing:       Independent  5). Lower Body Dressing:       Min A   6).  Pt to demonstrate UE exs x 15 reps with minimal cues      Plan: cont with [address] [place], OTR/L  #[id number]        If patient discharges from this facility prior to next visit, this note will serve as the Discharge Summary

## 2021-09-22 NOTE — PROGRESS NOTES
CARDIOLOGY PROGRESS NOTE      Patient Name: Susanna Jama  Date of admission: 2021 12:11 PM  Admission Dx: Septicemia [facility]) [A41.9]  Acute respiratory failure with hypoxia ([facility] [address] [J96.01]  Acute on chronic respiratory failure with hypoxia ([facility] [address] [J96.21]  Pneumonia due to COVID-19 virus [U07.1, J12.82]  Reason for Consult:  Atrial fibrillation  Requesting Physician: Claudene Rose, MD  Primary Care physician: HANNAH Barajas - ALEX    Subjective:     Susanna Jama is a 61 y.o. patient with a prior medical history notable for tobacco use, who presented to the hospital 21 with complaints of 3 days of shortness of breath and productive cough, fevers, chills and GI symptoms. He was found to be in hypoxic respiratory failure with increased work of breathing.      Patient seen with pulm/Dr. Herb Ibrahim who favors bacterial PNA. Combinations COVID-19/Flu testing negative as well as COVID-19 rapid and PCR. CT chest showed scattered GGO's, emphysema and RUL nodule. Initial Required mechanical ventilation from -.      Cardiology consulted for AF management. Patient reports doing much better today. Working with PT/OT and performing well with exercises. No chest pain, palpitations. Denies dizziness/LH with ambulation in room.       Drinks 6 PPD. Smokes 1/2 PPD. No illicit drug use he states. Mom with recent CVA 81 yo. Father  in 80's, cancer.        Home Medications:  Were reviewed and are listed in nursing record and/or below  Prior to Admission medications    Not on File        CURRENT Medications:  metoprolol succinate (TOPROL XL) extended release tablet 150 mg, Daily  regadenoson (LEXISCAN) injection 0.4 mg, ONCE PRN  hydrALAZINE (APRESOLINE) tablet 25 mg, 3 times per day  insulin lispro (HUMALOG) injection vial 0-12 Units, TID WC  insulin lispro (HUMALOG) injection vial 0-6 Units, Nightly  predniSONE (DELTASONE) tablet 30 mg, Daily  valsartan (DIOVAN) tablet 40 mg, Daily  amiodarone (CORDARONE) tablet 400 mg, Daily  ipratropium-albuterol (DUONEB) nebulizer solution 1 ampule, Q4H PRN  albuterol sulfate  (90 Base) MCG/ACT inhaler 2 puff, 4x daily  perflutren lipid microspheres (DEFINITY) injection 1.65 mg, ONCE PRN  pantoprazole (PROTONIX) injection 40 mg, Daily  enoxaparin (LOVENOX) injection 105 mg, BID  glucose (GLUTOSE) 40 % oral gel 15 g, PRN  dextrose 50 % IV solution, PRN  glucagon (rDNA) injection 1 mg, PRN  dextrose 5 % solution, PRN  polyethylene glycol (GLYCOLAX) packet 17 g, Daily PRN  acetaminophen (TYLENOL) tablet 650 mg, Q6H PRN   Or  acetaminophen (TYLENOL) suppository 650 mg, Q6H PRN  guaiFENesin-dextromethorphan (ROBITUSSIN DM) 100-10 MG/5ML syrup 5 mL, Q4H PRN  prochlorperazine (COMPAZINE) injection 10 mg, Q6H PRN  nicotine (NICODERM CQ) 14 MG/24HR 1 patch, Daily  sodium chloride flush 0.9 % injection 5-40 mL, 2 times per day  sodium chloride flush 0.9 % injection 5-40 mL, PRN  0.9 % sodium chloride infusion, PRN  LORazepam (ATIVAN) tablet 1 mg, Q1H PRN   Or  LORazepam (ATIVAN) injection 1 mg, Q1H PRN   Or  LORazepam (ATIVAN) tablet 2 mg, Q1H PRN   Or  LORazepam (ATIVAN) injection 2 mg, Q1H PRN   Or  LORazepam (ATIVAN) tablet 3 mg, Q1H PRN   Or  LORazepam (ATIVAN) injection 3 mg, Q1H PRN   Or  LORazepam (ATIVAN) tablet 4 mg, Q1H PRN   Or  LORazepam (ATIVAN) injection 4 mg, Q1H PRN        Allergies:  Patient has no known allergies. Review of Systems:   A 14 point review of symptoms completed. Pertinent positives identified in the HPI, all other review of symptoms negative.        Objective:     Vitals:    09/21/21 1654 09/21/21 1912 09/21/21 1925 09/22/21 0243   BP:  132/84  130/74   Pulse:  86  84   Resp:  18 16 16   Temp:  98 °F (36.7 °C)  97.8 °F (36.6 °C)   TempSrc:  Oral  Oral   SpO2:  96% 95% 94%   Weight:       Height: 5' 10\" (1.778 m)         Weight: 257 lb (116.6 kg)       PHYSICAL EXAM:    General:  NAD   Head:  Normocephalic, atraumatic   Eyes: <0.01 09/21/2021         Interval Testing/Data:     Telemetry personally reviewed:      ECHO 9/14/2021:     Summary   Right ventricular systolic function is mildly to moderately reduced .   The right atrium is mildly dilated.   Mild posterior mitral annular calcification is present.   Aortic valve sclerosis without aortic stenosis.   Normal systolic pulmonary artery pressure (SPAP) estimated at 33 mmHg (RA pressure 15 mmHg).   Left ventricular systolic function is moderately reduced with ejection fraction estimated at 35-40 %.   There is moderate global hypokinesis present.   Left ventricle size is normal.   There is mild concentric left ventricular hypertrophy.   Elevated left ventricular diastolic filling pressure: Septal E/e'' = 12.2 . Impression and Plan      1. Atrial fibrillation, new diagnosis - rate control approach, remains sub-optimal   2. Cardiomyopathy with BiV dysfunction, moderate LV dysfunction, undifferentiated  3. Acute hypoxic resp failure, improving  4. Multifocal Pneumonia, undifferentiated, COVID-19 testing negative   5. COPD  6. Sepsis, resolved  7. Lung lesion, 2.4 x 1.5, CT 3 months recommended    [id number] Score for Atrial Fibrillation Stroke Risk   Risk   Factors  Component Value   C CHF No 0   H HTN Yes 1   A2 Age >= 76 No,  (64 y.o.) 0   D DM No 0   S2 Prior Stroke/TIA No 0   V Vascular Disease No 0   A Age 74-69 No,  (64 y.o.) 0   Sc Sex male 0    [id number]  Score  1   Score last updated 9/21/21 8:40 AM EDT    Click here for a link to the UpToDate guideline \"Atrial Fibrillation: Anticoagulation therapy to prevent embolization    Disclaimer: Risk Score calculation is dependent on accuracy of patient problem list and past encounter diagnosis.         Patient Active Problem List   Diagnosis    Acute respiratory failure with hypoxia (HCC)    Multifocal pneumonia    Suspected COVID-19 virus infection    Septicemia ([address]    Acute on chronic respiratory failure with hypoxia ([address]  Pneumonia due to COVID-19 virus    Atrial fibrillation with RVR (HCC)    Cardiomyopathy (HCC)    Biventricular heart failure (HCC)    Essential hypertension       PLAN:  1. Continue Metoprolol  mg QAM; add metoprolol  mg QPM.  2. Will increase valsartan to 80 mg daily for GDMT, to start tomorrow. Stop hydralazine. 2. Transition amiodarone to 200 mg once daily on discharge with re-evaluation with me in couple weeks to see whether need for DCCV. 3. Continues on lovenox for [facility] - transition to oral anti-coagulant on Discharge. 4. Coronary disease by CT - will be treated with [address] [place] [address], [facility]. Add statin pending FLP tomorrow AM.   5. Counseled on alcohol cessation  6. Counseled on tobacco cessation     Possible that cardiomyopathy may be related to ETOH, arrhythmia. Stress test negative for inducible ischemia. Titrating medications as AF remains sub-optimal rate controlled. Hope for DC tomorrow. Will plan for follow up with me in 2-3 weeks for re-evaluation of AF and GDMT. I will address the patient's cardiac risk factors and adjusted pharmacologic treatment as needed. In addition, I have reinforced the need for patient directed risk factor modification. All questions and concerns were addressed to the patient/family. Alternatives to my treatment were discussed. Thank you for allowing us to participate in the care of [employer]. Please call me with any questions [phone].     Samantha Hunter MD, [facility]  Cardiovascular Disease  [facility]  [phone] [facility]  [phone] [address] [place] [address]  9/22/2021 7:53 AM

## 2021-09-22 NOTE — PROGRESS NOTES
Pulmonary Progress Note    CC: Shortness of breath    Events of Last 24 hours:   Complete lexiscan today      MV: 9/14/2021-9/18/2021   Vent Mode: AC/VC Rate Set: 0 bmp/Vt Ordered: 480 mL/ /FiO2 : 30 %  No results for input(s): PHART, TOU9SCA, PO2ART in the last 72 hours. IV:   dextrose      sodium chloride         Vitals:  Blood pressure 130/74, pulse 84, temperature 97.8 °F (36.6 °C), temperature source Oral, resp. rate 16, height 5' 10\" (1.778 m), weight 257 lb (116.6 kg), SpO2 96 %. On 1  L  EXAM:  General: Looks better, NAD  Eyes: PERRL. No sclera icterus. No conjunctival injection. ENT: No discharge. Pharynx clear. Neck: Trachea midline. Normal thyroid. Resp: No accessory muscle use. No crackles. No wheezing. No rhonchi. Decreased breath sounds  CV: Regular rate. Irregular rhythm. No mumur or rub. No edema. M/S: No cyanosis. No joint deformity. No clubbing. Neuro: Alert and oriented to person and events, speech is fluent. Psych: No agitation, no anxiety, affect is full.     Scheduled Meds:   metoprolol succinate  150 mg Oral Daily    hydrALAZINE  25 mg Oral 3 times per day    insulin lispro  0-12 Units SubCUTAneous TID WC    insulin lispro  0-6 Units SubCUTAneous Nightly    predniSONE  30 mg Oral Daily    valsartan  40 mg Oral Daily    amiodarone  400 mg Oral Daily    albuterol sulfate HFA  2 puff Inhalation 4x daily    pantoprazole  40 mg IntraVENous Daily    enoxaparin  1 mg/kg SubCUTAneous BID    nicotine  1 patch TransDERmal Daily    sodium chloride flush  5-40 mL IntraVENous 2 times per day     PRN Meds:  ipratropium-albuterol, perflutren lipid microspheres, glucose, dextrose, glucagon (rDNA), dextrose, polyethylene glycol, acetaminophen **OR** acetaminophen, guaiFENesin-dextromethorphan, prochlorperazine, sodium chloride flush, sodium chloride, LORazepam **OR** LORazepam **OR** LORazepam **OR** LORazepam **OR** LORazepam **OR** LORazepam **OR** LORazepam **OR** LORazepam    Results:  CBC:   Recent Labs     09/20/21  0408 09/21/21  0624 09/22/21  0647   WBC 9.4 10.9 11.6*   HGB 16.1 16.0 15.7   HCT 48.7 48.1 48.5   MCV 97.0 97.3 97.9    257 257     BMP:   Recent Labs     09/20/21  0408 09/21/21  0624 09/22/21  0647    142 140   K 4.5 4.9 4.2    102 99   CO2 31 33* 33*   BUN 27* 26* 26*   CREATININE 0.6* 0.6* 0.6*     LIVER PROFILE:   Recent Labs     09/20/21  0408 09/21/21  0624 09/22/21  0647   AST 57* 73* 77*   ALT 68* 91* 151*   BILITOT 0.7 0.7 1.1*   ALKPHOS 63 63 65       Cultures:  9/14/2021 SARS-CoV-2 NAAT and PCR negative  9/14/2021 blood NGTD  9/15/2021 urine antigens are negative  9/15/2021 tracheal aspirate strep pneumonia    Films:  9/14/21 CTPA   Pulmonary Arteries: Pulmonary arteries are adequately opacified for   evaluation.  No evidence of intraluminal filling defect to suggest pulmonary   embolism.  Main pulmonary artery is normal in caliber.       Mediastinum: The central airways are clear.  Right hilar lymphadenopathy   measuring 2.3 x 1.1 cm (2, 135).  The heart and pericardium demonstrate no   acute abnormality.  Atherosclerotic disease of the thoracic aorta.       Lungs/pleura: Irregular right upper lobe nodule measures 2.4 x 1.5 cm (2,   51).  Right lower lobe consolidation and scattered bilateral ground-glass   opacities, consistent with COVID-19 pneumonia.  Panlobular emphysema.  No   pleural effusion or pneumothorax.       Upper Abdomen: Right adrenal gland nodule measuring 1.6 cm.  Left adrenal   gland hyperplasia.  Rest of visualized upper abdomen is unremarkable.       Soft Tissues/Bones: Degenerate disease of the thoracic spine.  No focal   osseous lesion.  Visualized soft tissues are unremarkable.           Impression   No evidence of pulmonary embolism.       COVID-19 pneumonia as described above.       Irregular right upper lobe mass as measured above.      CXR 9/18/2021 ET tube okay     ASSESSMENT:  · Acute hypoxic and hypercapnic respiratory failure  · Community acquired pneumonia -streptococcal pneumonia  · 2.4 cm right upper lobe pulmonary nodule and enlarged right hilar LN; concerning for malignancy. · Right adrenal nodule 1.6 cm, not characterized by reading radiologist  · Pulmonary emphysema  · Afib RVR  · Cardiomyopathy      PLAN:  Supplemental oxygen to maintain SaO2 >92%; wean as tolerated    Completed CTX day #7/7, received 5 days Azithromycin   Dec to 20mg, ok to stop Prednisone on d/c  Tobacco cessation is recommended   · Consider outpatient CT PET for suspicious Pulmonary Nodule/hilar lymphadenopathy. Office notified. · DC planning when cleared by cardiology.

## 2021-09-22 NOTE — PROGRESS NOTES
Hospitalist Progress Note      PCP: Perri Apley, APRN - [facility]    Date of Admission: 9/14/2021    resp failure, was on vent support , Afibb with RVR. Feeling better. No fever. Subjective:     Scheduled for second half of the stress test today. He was 85% on room air. He was 85% on room air. He is 92% on 1 L. Medications:  Reviewed    Infusion Medications    dextrose      sodium chloride       Scheduled Medications    metoprolol succinate  150 mg Oral Daily    hydrALAZINE  25 mg Oral 3 times per day    insulin lispro  0-12 Units SubCUTAneous TID WC    insulin lispro  0-6 Units SubCUTAneous Nightly    predniSONE  30 mg Oral Daily    valsartan  40 mg Oral Daily    amiodarone  400 mg Oral Daily    albuterol sulfate HFA  2 puff Inhalation 4x daily    pantoprazole  40 mg IntraVENous Daily    enoxaparin  1 mg/kg SubCUTAneous BID    nicotine  1 patch TransDERmal Daily    sodium chloride flush  5-40 mL IntraVENous 2 times per day     PRN Meds: regadenoson, ipratropium-albuterol, perflutren lipid microspheres, glucose, dextrose, glucagon (rDNA), dextrose, polyethylene glycol, acetaminophen **OR** acetaminophen, guaiFENesin-dextromethorphan, prochlorperazine, sodium chloride flush, sodium chloride, LORazepam **OR** LORazepam **OR** LORazepam **OR** LORazepam **OR** LORazepam **OR** LORazepam **OR** LORazepam **OR** LORazepam      Intake/Output Summary (Last 24 hours) at 9/22/2021 0736  Last data filed at 9/22/2021 0432  Gross per 24 hour   Intake 440 ml   Output 2050 ml   Net -1610 ml       Physical Exam Performed:    /74   Pulse 84   Temp 97.8 °F (36.6 °C) (Oral)   Resp 16   Ht 5' 10\" (1.778 m)   Wt 257 lb (116.6 kg)   SpO2 94%   BMI 36.88 kg/m²           General: middle aged male, up in bed   Awake, alert and oriented. Appears to be not in any distress  Mucous Membranes:  Pink , anicteric  Neck: No JVD, no carotid bruit, no thyromegaly  Chest: diminished BS. Few crackles.  No wheezes  Cardiovascular: irregular  S1S2 heard, no murmurs or gallops  Abdomen:  Soft, obese, undistended, non tender, no organomegaly, BS present  Extremities: No edema or cyanosis. Distal pulses well felt  Neurological : grossly normal      Labs:   Recent Labs     09/20/21  0408 09/21/21  0624 09/22/21  0647   WBC 9.4 10.9 11.6*   HGB 16.1 16.0 15.7   HCT 48.7 48.1 48.5    257 257     Recent Labs     09/20/21  0408 09/21/21  0624    142   K 4.5 4.9    102   CO2 31 33*   BUN 27* 26*   CREATININE 0.6* 0.6*   CALCIUM 8.6 8.9     Recent Labs     09/20/21  0408 09/21/21  0624   AST 57* 73*   ALT 68* 91*   BILITOT 0.7 0.7   ALKPHOS 63 63     No results for input(s): INR in the last 72 hours. Recent Labs     09/21/21  0624 09/21/21  1921   TROPONINI <0.01 <0.01       Urinalysis:      Lab Results   Component Value Date    NITRU Negative 09/15/2021    WBCUA 0-2 09/15/2021    BACTERIA 1+ 09/15/2021    RBCUA 0-2 09/15/2021    BLOODU MODERATE 09/15/2021    SPECGRAV 1.025 09/15/2021    GLUCOSEU Negative 09/15/2021       Radiology:  XR CHEST PORTABLE   Final Result   Interval extubation. The lungs appear clear. XR CHEST PORTABLE   Final Result   Improving aeration in the lungs with minimal bibasilar opacities remaining. XR CHEST PORTABLE   Final Result   No substantial interval change in mild bibasilar airspace disease as compared   to prior. XR CHEST PORTABLE   Final Result   Improving aeration of the lungs. XR CHEST PORTABLE   Final Result   Lines and tubes as described. Multifocal airspace disease appears similar to prior exam.         CT CHEST PULMONARY EMBOLISM W CONTRAST   Final Result   No evidence of pulmonary embolism. COVID-19 pneumonia as described above. Irregular right upper lobe mass as measured above. RECOMMENDATIONS:   Chest CT in 3 months is recommended for follow-up evaluation of right upper   lobe irregular lesion.          XR CHEST PORTABLE   Final Result   1. Nodular/ill-defined opacity in the right upper lung zone. Recommend   dedicated noncontrast CT for further evaluation and to exclude a nodule. 2. Patchy right-sided pulmonary opacities are noted, which could represent   multifocal pneumonia, atelectasis or atypical edema. 3. Small bilateral pleural fluid. NM Cardiac Stress Test Nuclear Imaging    (Results Pending)     Sputum -- strep pneumoniae  Blood - NGTD  covid - not detected     ECHO       Right ventricular systolic function is mildly to moderately reduced . The right atrium is mildly dilated. Mild posterior mitral annular calcification is present. Aortic valve sclerosis without aortic stenosis. Normal systolic pulmonary artery pressure (SPAP) estimated at 33 mmHg (RA   pressure 15 mmHg). Left ventricular systolic function is moderately reduced with ejection fraction estimated at 35-40 %. There is moderate global hypokinesis present. Left ventricle size is normal.   There is mild concentric left ventricular hypertrophy. Elevated left ventricular diastolic filling pressure: Septal E/e'' = 12.2 . Assessment/Plan:    Principal Problem:    Multifocal pneumonia  Active Problems:    Acute respiratory failure with hypoxia (HCC)    Suspected COVID-19 virus infection    Septicemia (HCC)    Acute on chronic respiratory failure with hypoxia (HCC)    Atrial fibrillation with RVR (HCC)    Cardiomyopathy (HCC)    Biventricular heart failure (HCC)    Essential hypertension  Resolved Problems:    * No resolved hospital problems. *         Acute respiratory failure with hypoxia and hypercapnia  Streptococcus pneumonia   Copd exacerbation     - Admitted to ICU   , worsened hypoxia, failed bipap and needed intubation and vent support for few days ( 9./14)  - Intensivist c/s.   - extubated 9/18  - will require oxygen at discharge.      Sepsis resolved.   - sec to strep pna   -  ABX as below  - BP stable   - blood cx remain neg, wbc stable. No fevers     Multifocal PNA- streptococcus pna  - treated with azithromycin for 5 days, rocephin D7  - PRN/[facility] intensive NEB therapy.    - need f/w imaging see below      Atrial Fibrillation with RVR  -  started cardizem gtt on 9/18 - increased from 10-->12.5mg/hr  - added BB and later amio initiated  - wean cardizem gtt  - ECHO with low EF of 35- 40 %. Might need ischemic workup and close follow up   - cardiology following   - TSH wnl. On lovenox bid. Can switch to oral AC at discharge. - stop IV Cardizem. Avoid PO Cardizem given negative inotropic effect. Increase dose of Toprol XL. Consider Digoxin if HR still high. - Lexiscan Myoview     Hyponatremia  - resolved     Hyperglycemia  - likely 2/2 steroids, no DM , A1c at 5.4  - low dose SSI     Transaminitis  - slightly worsened from admission- likely with pna  - no priors available for comparison  - monitor LFTs     Irregular RUL lung mass  - 2.4 cm  In size with H ilar LAD  - need close f/w . pulm managing         Tobacco Abuse  - counseled cessation  - 14 mg nicotine patch.         DVT Prophylaxis: Lovenox  Diet: Diet NPO Exceptions are: Ice Chips, Sips of Water with Meds  Code Status: Full Code    PT/OT Eval Status: ordered    Discharge planning.     Noelle Castrejon MD 9/22/2021 7:36 AM

## 2021-09-23 ENCOUNTER — TELEPHONE (OUTPATIENT)
Dept: PULMONOLOGY | Age: 66
End: 2021-09-23

## 2021-09-23 VITALS
RESPIRATION RATE: 16 BRPM | BODY MASS INDEX: 36.71 KG/M2 | SYSTOLIC BLOOD PRESSURE: 108 MMHG | OXYGEN SATURATION: 98 % | WEIGHT: 256.4 LBS | HEIGHT: 70 IN | TEMPERATURE: 98 F | HEART RATE: 108 BPM | DIASTOLIC BLOOD PRESSURE: 60 MMHG

## 2021-09-23 DIAGNOSIS — R91.1 RIGHT UPPER LOBE PULMONARY NODULE: Primary | ICD-10-CM

## 2021-09-23 DIAGNOSIS — R91.1 PULMONARY NODULE: ICD-10-CM

## 2021-09-23 LAB
A/G RATIO: 1.3 (ref 1.1–2.2)
ALBUMIN SERPL-MCNC: 3.6 G/DL (ref 3.4–5)
ALP BLD-CCNC: 70 U/L (ref 40–129)
ALT SERPL-CCNC: 135 U/L (ref 10–40)
ANION GAP SERPL CALCULATED.3IONS-SCNC: 10 MMOL/L (ref 3–16)
AST SERPL-CCNC: 60 U/L (ref 15–37)
BASOPHILS ABSOLUTE: 0 K/UL (ref 0–0.2)
BASOPHILS RELATIVE PERCENT: 0.2 %
BILIRUB SERPL-MCNC: 1.1 MG/DL (ref 0–1)
BUN BLDV-MCNC: 26 MG/DL (ref 7–20)
CALCIUM SERPL-MCNC: 8.9 MG/DL (ref 8.3–10.6)
CHLORIDE BLD-SCNC: 101 MMOL/L (ref 99–110)
CO2: 30 MMOL/L (ref 21–32)
CREAT SERPL-MCNC: 0.7 MG/DL (ref 0.8–1.3)
EOSINOPHILS ABSOLUTE: 0.1 K/UL (ref 0–0.6)
EOSINOPHILS RELATIVE PERCENT: 0.7 %
GFR AFRICAN AMERICAN: >60
GFR NON-AFRICAN AMERICAN: >60
GLOBULIN: 2.8 G/DL
GLUCOSE BLD-MCNC: 101 MG/DL (ref 70–99)
GLUCOSE BLD-MCNC: 114 MG/DL (ref 70–99)
GLUCOSE BLD-MCNC: 143 MG/DL (ref 70–99)
GLUCOSE BLD-MCNC: 96 MG/DL (ref 70–99)
HCT VFR BLD CALC: 52.3 % (ref 40.5–52.5)
HEMOGLOBIN: 17.2 G/DL (ref 13.5–17.5)
LYMPHOCYTES ABSOLUTE: 1.7 K/UL (ref 1–5.1)
LYMPHOCYTES RELATIVE PERCENT: 16.3 %
MCH RBC QN AUTO: 32 PG (ref 26–34)
MCHC RBC AUTO-ENTMCNC: 32.8 G/DL (ref 31–36)
MCV RBC AUTO: 97.6 FL (ref 80–100)
MONOCYTES ABSOLUTE: 0.8 K/UL (ref 0–1.3)
MONOCYTES RELATIVE PERCENT: 7.3 %
NEUTROPHILS ABSOLUTE: 7.9 K/UL (ref 1.7–7.7)
NEUTROPHILS RELATIVE PERCENT: 75.5 %
PDW BLD-RTO: 14.1 % (ref 12.4–15.4)
PERFORMED ON: ABNORMAL
PERFORMED ON: ABNORMAL
PERFORMED ON: NORMAL
PLATELET # BLD: 247 K/UL (ref 135–450)
PMV BLD AUTO: 8.4 FL (ref 5–10.5)
POTASSIUM REFLEX MAGNESIUM: 4.2 MMOL/L (ref 3.5–5.1)
RBC # BLD: 5.36 M/UL (ref 4.2–5.9)
SODIUM BLD-SCNC: 141 MMOL/L (ref 136–145)
TOTAL PROTEIN: 6.4 G/DL (ref 6.4–8.2)
WBC # BLD: 10.5 K/UL (ref 4–11)

## 2021-09-23 PROCEDURE — 94761 N-INVAS EAR/PLS OXIMETRY MLT: CPT

## 2021-09-23 PROCEDURE — 6360000002 HC RX W HCPCS: Performed by: INTERNAL MEDICINE

## 2021-09-23 PROCEDURE — 80053 COMPREHEN METABOLIC PANEL: CPT

## 2021-09-23 PROCEDURE — 99239 HOSP IP/OBS DSCHRG MGMT >30: CPT | Performed by: INTERNAL MEDICINE

## 2021-09-23 PROCEDURE — 99233 SBSQ HOSP IP/OBS HIGH 50: CPT | Performed by: INTERNAL MEDICINE

## 2021-09-23 PROCEDURE — 80061 LIPID PANEL: CPT

## 2021-09-23 PROCEDURE — 94640 AIRWAY INHALATION TREATMENT: CPT

## 2021-09-23 PROCEDURE — 6370000000 HC RX 637 (ALT 250 FOR IP): Performed by: INTERNAL MEDICINE

## 2021-09-23 PROCEDURE — 36415 COLL VENOUS BLD VENIPUNCTURE: CPT

## 2021-09-23 PROCEDURE — 2580000003 HC RX 258: Performed by: INTERNAL MEDICINE

## 2021-09-23 PROCEDURE — 85025 COMPLETE CBC W/AUTO DIFF WBC: CPT

## 2021-09-23 PROCEDURE — C9113 INJ PANTOPRAZOLE SODIUM, VIA: HCPCS | Performed by: INTERNAL MEDICINE

## 2021-09-23 PROCEDURE — 2700000000 HC OXYGEN THERAPY PER DAY

## 2021-09-23 RX ORDER — AMIODARONE HYDROCHLORIDE 200 MG/1
200 TABLET ORAL DAILY
Qty: 30 TABLET | Refills: 2 | Status: SHIPPED | OUTPATIENT
Start: 2021-09-23 | End: 2021-12-17 | Stop reason: ALTCHOICE

## 2021-09-23 RX ORDER — METOPROLOL SUCCINATE 200 MG/1
200 TABLET, EXTENDED RELEASE ORAL DAILY
Qty: 30 TABLET | Refills: 2 | Status: SHIPPED | OUTPATIENT
Start: 2021-09-23 | End: 2021-12-17 | Stop reason: SDUPTHER

## 2021-09-23 RX ORDER — ALBUTEROL SULFATE 90 UG/1
2 AEROSOL, METERED RESPIRATORY (INHALATION) 3 TIMES DAILY
Qty: 18 G | Refills: 3 | Status: SHIPPED | OUTPATIENT
Start: 2021-09-23 | End: 2022-02-16 | Stop reason: SDUPTHER

## 2021-09-23 RX ORDER — IPRATROPIUM BROMIDE AND ALBUTEROL SULFATE 2.5; .5 MG/3ML; MG/3ML
3 SOLUTION RESPIRATORY (INHALATION) EVERY 4 HOURS PRN
Qty: 360 ML | Refills: 2 | Status: SHIPPED | OUTPATIENT
Start: 2021-09-23 | End: 2022-02-16 | Stop reason: SDUPTHER

## 2021-09-23 RX ORDER — GUAIFENESIN/DEXTROMETHORPHAN 100-10MG/5
5 SYRUP ORAL EVERY 4 HOURS PRN
Qty: 120 ML | Refills: 0 | Status: SHIPPED | OUTPATIENT
Start: 2021-09-23 | End: 2021-10-03

## 2021-09-23 RX ORDER — PREDNISONE 20 MG/1
20 TABLET ORAL DAILY
Qty: 5 TABLET | Refills: 0 | Status: SHIPPED | OUTPATIENT
Start: 2021-09-23 | End: 2021-09-28

## 2021-09-23 RX ORDER — VALSARTAN 80 MG/1
80 TABLET ORAL DAILY
Qty: 30 TABLET | Refills: 3 | Status: SHIPPED | OUTPATIENT
Start: 2021-09-23 | End: 2021-12-17 | Stop reason: ALTCHOICE

## 2021-09-23 RX ADMIN — Medication 2 PUFF: at 07:11

## 2021-09-23 RX ADMIN — PREDNISONE 20 MG: 20 TABLET ORAL at 09:22

## 2021-09-23 RX ADMIN — Medication 2 PUFF: at 13:45

## 2021-09-23 RX ADMIN — ENOXAPARIN SODIUM 105 MG: 150 INJECTION SUBCUTANEOUS at 09:22

## 2021-09-23 RX ADMIN — SODIUM CHLORIDE, PRESERVATIVE FREE 10 ML: 5 INJECTION INTRAVENOUS at 09:25

## 2021-09-23 RX ADMIN — PANTOPRAZOLE SODIUM 40 MG: 40 INJECTION, POWDER, FOR SOLUTION INTRAVENOUS at 09:23

## 2021-09-23 RX ADMIN — AMIODARONE HYDROCHLORIDE 200 MG: 200 TABLET ORAL at 09:22

## 2021-09-23 ASSESSMENT — PAIN SCALES - GENERAL: PAINLEVEL_OUTOF10: 0

## 2021-09-23 NOTE — PROGRESS NOTES
IV distal RFA removed. Catheter intact, dressing applied and pt tolerated well. IV RFA removed. Catheter intact, dressing applied and pt tolerated well. Discharge instructions completed with patient. Pt verbalized understanding. Oxygen switched over to home O2 tank @ 2L. PCA assisted pt to get dressed. Family called and notified of discharge, and pt is awaiting family to arrive. Pt denies further needs at this time.

## 2021-09-23 NOTE — CARE COORDINATION
DISCHARGE ORDER  Date/Time 2021 1:52 PM  Completed by: Rebeca Soto RN, Case Management    Patient Name: Rosy Hernández      : 1955  Admitting Diagnosis: Septicemia ([facility] [A41.9]  Acute respiratory failure with hypoxia ([address] [J96.01]  Acute on chronic respiratory failure with hypoxia ([facility] [J96.21]  Pneumonia due to COVID-19 virus [U07.1, J12.82]      Admit order Date and Status: 21 inpt  (verify MD's last order for status of admission)      Noted discharge order. If applicable PT/OT recommendation at Discharge: Home therapy  DME recommendation by PT/OT:RW and Sh Chair  Confirmed discharge plan  : Yes  with whom patient  If pt confirmed DC plan does family need to be contacted by CM Yes if yes who brother  Discharge Plan:  Order for dc noted. Spoke with pt who plans to go to brothers home at [place]. Discussed HHC and pt request discussion with his HELEN. Spoke with HELEN Patricia Prince  And [facility]. Referral called to Gregoria Arevalo at [facility] who will arrange for [address] needs at [place]. Discussed with pt need for home O2 and he chooses [facility]. Referral called to Caryle Ivans at [facility] who will arrange for home O2 needs. Portable tank supplied. Also gave RW. Chart reviewed and no other dc needs identified. Reviewed chart. Role of discharge planner explained and patient verbalized understanding. Discharge order is noted. Has Home O2 in place on admit:  No  Informed of need to bring portable home O2 tank on day of discharge for nursing to connect prior to leaving:   Not Indicated  Verbalized agreement/Understanding:   Not Indicated  Pt is being d/c'd to home today. Pt's O2 sats are 93% on RA. Discharge timeout done with  Nsg, CM and pt. All discharge needs and concerns addressed.

## 2021-09-23 NOTE — PROGRESS NOTES
O2 Sat at rest on room air is 90 %. O2 Sat with activity on room air is 81 %. O2 Sat at rest with oxygen @ 2  lpm is  96 %. O2 Sat with activity with oxygen @ 2 lpm is 94 %.

## 2021-09-23 NOTE — DISCHARGE INSTR - COC
Continuity of Care Form    Patient Name: Jamel Townsend   :  1955  MRN:  [id number]    Admit date:  2021  Discharge date:  21    Code Status Order: Full Code   Advance Directives:     Admitting Physician:  Marie Pedroza MD  PCP: Rajiv Wilson APRN - NP    Discharging Nurse: [facility] Unit/Room#: 0219/0219-01  Discharging Unit Phone Number: [phone]    Emergency Contact:   Extended Emergency Contact Information  Primary Emergency Contact: SongHitesh bates  Address: [place] [place] [address] [address] Phone: [phone]  Relation: Brother/Sister  Secondary Emergency Contact: Tsering Lozano  Address: [place] [address] [address] Phone: [phone]  Relation: Brother/Sister    Past Surgical History:  History reviewed. No pertinent surgical history. Immunization History: There is no immunization history on file for this patient.     Active Problems:  Patient Active Problem List   Diagnosis Code    Acute respiratory failure with hypoxia (HCC) J96.01    Multifocal pneumonia J18.9    Suspected COVID-19 virus infection Z20.822    Septicemia ([address] A41.9    Acute on chronic respiratory failure with hypoxia ([address] J96.21    Pneumonia due to COVID-19 virus U07.1, J12.82    Atrial fibrillation with RVR ([address] I48.91    Cardiomyopathy ([address] I42.9    Biventricular heart failure (HCC) I50.82    Essential hypertension I10       Isolation/Infection:   Isolation          No Isolation        Patient Infection Status     Infection Onset Added Last Indicated Last Indicated By Review Planned Expiration Resolved Resolved By    None active    Resolved    COVID-19 Rule Out 21 COVID-19 & Influenza Combo (Ordered)   21 Rule-Out Test Resulted    COVID-19 Rule Out 21 COVID-19, Rapid (Ordered)   21 Rule-Out Test Resulted          Nurse Assessment:  Last Vital Signs: /60   Pulse 108   Temp 98 °F (36.7 °C) (Oral)   Resp 16   Ht 5' 10\" (1.778 m)   Wt 256 lb 6.4 oz (116.3 kg)   SpO2 93%   BMI 36.79 kg/m²     Last documented pain score (0-10 scale): Pain Level: 0  Last Weight:   Wt Readings from Last 1 Encounters:   09/23/21 256 lb 6.4 oz (116.3 kg)     Mental Status:  alert    IV Access:  - None    Nursing Mobility/ADLs:  Walking   Independent  Transfer  Independent  Bathing  Independent  Dressing  Independent  Toileting  Independent  Feeding  [address]  Independent  Med Delivery   whole    Wound Care Documentation and Therapy:        Elimination:  Continence:   · Bowel: Yes  · Bladder: Yes  Urinary Catheter: None   Colostomy/Ileostomy/Ileal Conduit: No       Date of Last BM:     Intake/Output Summary (Last 24 hours) at 9/23/2021 1327  Last data filed at 9/22/2021 1805  Gross per 24 hour   Intake 420 ml   Output 1550 ml   Net -1130 ml     I/O last 3 completed shifts: In: 600 [P.O.:600]  Out: 2450 [Urine:2450]    Safety Concerns:     None    Impairments/Disabilities:      None    Nutrition Therapy:  Current Nutrition Therapy:   - Oral Diet:  General    Routes of Feeding: Oral  Liquids: No Restrictions  Daily Fluid Restriction: no  Last Modified Barium Swallow with Video (Video Swallowing Test): not done    Treatments at the Time of Hospital Discharge:   Respiratory Treatments: Inhalers  Oxygen Therapy:  is on oxygen at 2 L/min per nasal cannula.   Ventilator:    - No ventilator support    Rehab Therapies: Physical Therapy  Weight Bearing Status/Restrictions: No weight bearing restirctions  Other Medical Equipment (for information only, NOT a DME order):  walker  Other Treatments: ***    Patient's personal belongings (please select all that are sent with patient):  None    RN SIGNATURE:  Electronically signed by Anastacio Mchugh RN on 9/23/21 at 1:45 PM EDT    CASE MANAGEMENT/SOCIAL WORK

## 2021-09-23 NOTE — TELEPHONE ENCOUNTER
Currently MD Diana Esquivel MA  Needs follow up with Dr. Laura Amador to order PET CT for new nodule as well as COPD new home O2

## 2021-09-23 NOTE — PROGRESS NOTES
Pulmonary Progress Note    CC: Shortness of breath    Events of Last 24 hours:   required 2lpm O2 on exertion      MV: 9/14/2021-9/18/2021   Vent Mode: AC/VC Rate Set: 0 bmp/Vt Ordered: 480 mL/ /FiO2 : 30 %  No results for input(s): PHART, HEV6VQM, PO2ART in the last 72 hours. IV:   dextrose      sodium chloride         Vitals:  Blood pressure 108/60, pulse 108, temperature 98 °F (36.7 °C), temperature source Oral, resp. rate 16, height 5' 10\" (1.778 m), weight 256 lb 6.4 oz (116.3 kg), SpO2 93 %. On 2  L  EXAM:  General: Looks better, NAD  Eyes: PERRL. No sclera icterus. No conjunctival injection. ENT: No discharge. Pharynx clear. Neck: Trachea midline. Normal thyroid. Resp: No accessory muscle use. No crackles. No wheezing. No rhonchi. Decreased breath sounds  CV: Regular rate. Irregular rhythm. No mumur or rub. No edema. M/S: No cyanosis. No joint deformity. No clubbing. Neuro: Alert and oriented to person and events, speech is fluent. Psych: No agitation, no anxiety, affect is full.     Scheduled Meds:   predniSONE  20 mg Oral Daily    albuterol sulfate HFA  2 puff Inhalation TID    valsartan  80 mg Oral Daily    amiodarone  200 mg Oral Daily    metoprolol succinate  150 mg Oral Daily    insulin lispro  0-12 Units SubCUTAneous TID WC    insulin lispro  0-6 Units SubCUTAneous Nightly    pantoprazole  40 mg IntraVENous Daily    enoxaparin  1 mg/kg SubCUTAneous BID    nicotine  1 patch TransDERmal Daily    sodium chloride flush  5-40 mL IntraVENous 2 times per day     PRN Meds:  ipratropium-albuterol, perflutren lipid microspheres, glucose, dextrose, glucagon (rDNA), dextrose, polyethylene glycol, acetaminophen **OR** acetaminophen, guaiFENesin-dextromethorphan, prochlorperazine, sodium chloride flush, sodium chloride, LORazepam **OR** LORazepam **OR** LORazepam **OR** LORazepam **OR** LORazepam **OR** LORazepam **OR** LORazepam **OR** LORazepam    Results:  CBC:   Recent Labs -streptococcal pneumonia  · 2.4 cm right upper lobe pulmonary nodule and enlarged right hilar LN; concerning for malignancy.     · Right adrenal nodule 1.6 cm, not characterized by reading radiologist  · Pulmonary emphysema  · Afib RVR      PLAN:  Supplemental oxygen to maintain SaO2 >92%; wean as tolerated    Completed CTX x 7 days and 5 days Azithromycin   Prednisone taper  Tobacco cessation is recommended   · Outpatient CT PET for suspicious Pulmonary Nodule/hilar lymphadenopathy   · DC planning

## 2021-09-23 NOTE — FLOWSHEET NOTE
09/23/21 0915   Vital Signs   Temp 98 °F (36.7 °C)   Temp Source Oral   Pulse 108   Heart Rate Source Monitor   Resp 16   /60   BP Location Right upper arm   Patient Position Semi fowlers   Level of Consciousness Alert (0)   MEWS Score 2   Patient Currently in Pain Denies   Pain Assessment   Pain Assessment 0-10   Oxygen Therapy   SpO2 93 %   O2 Device None (Room air)   Shift assessment complete. See flow sheet. Scheduled medications given, See MAR. Head to toe complete. Vital signs logged and active bowel sounds in all 4 quadrants. Meds taken whole with water. Pt moved from chair to bed. No further needs noted at this time. Call light and bedside table within reach. Bed in lowest position, wheels locked and side rails up x2.      Sudha Cheatham RN

## 2021-09-23 NOTE — PLAN OF CARE
Problem: Discharge Planning:  Goal: Patients continuum of care needs are met  Description: Patients continuum of care needs are met  Outcome: Ongoing     Problem: Nutrition  Goal: Optimal nutrition therapy  Outcome: Ongoing     Problem: Falls - Risk of:  Goal: Will remain free from falls  Description: Will remain free from falls  Outcome: Ongoing

## 2021-09-23 NOTE — FLOWSHEET NOTE
09/23/21 0216   Vital Signs   Temp 99.5 °F (37.5 °C)   Temp Source Oral   Pulse 70   Resp 17   BP (!) 141/74   BP Location Left upper arm   Patient Position Semi fowlers   Oxygen Therapy   SpO2 93 %   O2 Device None (Room air)   Height and Weight   Weight 256 lb 6.4 oz (116.3 kg)   Weight Method Bed scale   BMI (Calculated) 36.9   Shift assessment complete; see flow sheet. Scheduled medications administered; See MAR. IV saline locked. O2 1 LPM nc in place. Pt performed stress test on 9/22 awaiting results. Avina catheter no longer present upon initial assessment Pt denies any needs at this time. Call light within reach, bed in low locked position, bed alarm on.

## 2021-09-23 NOTE — DISCHARGE SUMMARY
Name:  Bertha Zhou  Room:  0219/0219-01  MRN:    [id number]    Discharge Summary      This discharge summary is in conjunction with a complete physical exam done on the day of discharge. Discharging Physician: Melinda Lees MD       Admit: 9/14/2021  Discharge:   9/23/2021     Diagnoses this Admission    Principal Problem:    Multifocal pneumonia  Active Problems:    Acute respiratory failure with hypoxia (HCC)    Suspected COVID-19 virus infection    Septicemia ([facility]    Acute on chronic respiratory failure with hypoxia (HCC)    Atrial fibrillation with RVR (HCC)    Cardiomyopathy ([facility]    Biventricular heart failure ([facility]    Essential hypertension  Resolved Problems:    * No resolved hospital problems. *    Procedures (Please Review Full Report for Details)    Intubation    Consults    Cardiology  Pulmonology    HPI:  The patient is a 61 y.o. male with PMH below, presents with SOB/BROWN, resp distress, productive cough, hypoxia, fever, chills, diarrhea, nausea. Patient presented to the ER in respiratory distress and satting 88% on room air. He is not normally on O2. He has been requiring 3 to 4 L of O2 to maintain sats. Blood gas was obtained and he is now being started on BiPAP secondary to respiratory acidosis and hypercapnia. He denies history of lung problems. He is currently a smoker and has been long-term. Strongly suspect some underlying lung pathology. He has had associated productive cough, nausea, diarrhea, fever and chills. Symptoms started 3 days ago. No additional history available at this time. Physical Exam at Discharge:  BP (!) 141/74   Pulse 70   Temp 99.5 °F (37.5 °C) (Oral)   Resp 18   Ht 5' 10\" (1.778 m)   Wt 256 lb 6.4 oz (116.3 kg)   SpO2 94%   BMI 36.79 kg/m²   General: middle aged male, up in bed   Awake, alert and oriented.  Appears to be not in any distress  Mucous Membranes:  Pink , anicteric  Neck: No JVD, no carotid bruit, no thyromegaly  Chest: diminished BS. no crackles. No wheezes  Cardiovascular: irregular  S1S2 heard, no murmurs or gallops  Abdomen:  Soft, obese, undistended, non tender, no organomegaly, BS present  Extremities: No edema or cyanosis. Distal pulses well felt  Neurological : grossly normal       Hospital Course     Acute respiratory failure with hypoxia and hypercapnia  Streptococcus pneumonia   Copd exacerbation      - Admitted to ICU   , worsened hypoxia, failed bipap and needed intubation and vent support for few days ( 9./14)  - Intensivist c/s.   - extubated 9/18  - On 2 L at discharge. Dropped to 81% on room air. Discussed face to face with patient. Sent home with 5 days of Prednisone/duoneb and Albuterol. Advised to stop smoking.      Sepsis resolved. - sec to strep pna   -  ABX as below  - BP stable   - blood cx remain neg, wbc stable. No fevers     Multifocal PNA- streptococcus pna  - treated with azithromycin for 5 days, rocephin D7  - PRN/AURORA intensive NEB therapy.    - need f/w imaging see below      Atrial Fibrillation with RVR. HR controlled at discharge  -  started cardizem gtt on 9/18 - increased from 10-->12.5mg/hr  - added BB and later amio initiated  - wean cardizem gtt  - ECHO with low EF of 35- 40 %. Might need ischemic workup and close follow up   - cardiology following   - TSH wnl. On lovenox bid. Changed to Xarelto at discharge   - stop IV Cardizem. Avoid PO Cardizem given negative inotropic effect. Increase dose of Toprol XL to 200 mg at discharge. Consider Digoxin if HR still high. - Simona Winkler Myoview-no reversible ischemia     Hyponatremia  - resolved     Hyperglycemia  - likely 2/2 steroids, no DM , A1c at 5.4  - low dose SSI  - no diabetes     Transaminitis  - slightly worsened from admission- likely with pna  - no priors available for comparison  - monitor LFTs.  OP follow up      Irregular RUL lung mass  - 2.4 cm  In size with H ilar LAD  - need close f/w . pulm managing- OP PET per Dr Reggie Fenton note       Tobacco Abuse  - counseled cessation  - 14 mg nicotine patch. CBC: Recent Labs     09/21/21  0624 09/22/21  0647 09/23/21  0614   WBC 10.9 11.6* 10.5   HGB 16.0 15.7 17.2   HCT 48.1 48.5 52.3   MCV 97.3 97.9 97.6    257 247     BMP:   Recent Labs     09/21/21  0624 09/22/21  0647 09/23/21  0614    140 141   K 4.9 4.2 4.2    99 101   CO2 33* 33* 30   BUN 26* 26* 26*   CREATININE 0.6* 0.6* 0.7*     LIVER PROFILE:   Recent Labs     09/21/21  0624 09/22/21  0647 09/23/21 0614   AST 73* 77* 60*   ALT 91* 151* 135*   BILITOT 0.7 1.1* 1.1*   ALKPHOS 63 65 70   Sputum -- strep pneumoniae  Blood - NGTD  covid - not detected   Strep pneumo antigen, urine: neg  Legionella antigen, urine: neg  ECHO 9/17/21  Conclusions      Summary   Right ventricular systolic function is mildly to moderately reduced . The right atrium is mildly dilated. Mild posterior mitral annular calcification is present. Aortic valve sclerosis without aortic stenosis. Normal systolic pulmonary artery pressure (SPAP) estimated at 33 mmHg (RA   pressure 15 mmHg). Left ventricular systolic function is moderately reduced with ejection   fraction estimated at 35-40 %. There is moderate global hypokinesis present. Left ventricle size is normal.   There is mild concentric left ventricular hypertrophy. Elevated left ventricular diastolic filling pressure: Septal E/e'' = 12.2 .       Signature      ------------------------------------------------------------------   Electronically signed by Leslee Martin MD, [facility] (Interpreting   physician) on 09/17/2021 at 05:01 PM   ------------------------------------------------------------------     Stress test 9/21/21  Conclusions        Summary    Normal LV function.   Drake Fail is normal isotope uptake at stress and rest. There is no evidence of    myocardial ischemia or scar.    Low risk study         NM Cardiac Stress Test Nuclear Imaging   Final Result      XR CHEST PORTABLE Final Result   Interval extubation. The lungs appear clear. XR CHEST PORTABLE   Final Result   Improving aeration in the lungs with minimal bibasilar opacities remaining. XR CHEST PORTABLE   Final Result   No substantial interval change in mild bibasilar airspace disease as compared   to prior. XR CHEST PORTABLE   Final Result   Improving aeration of the lungs. XR CHEST PORTABLE   Final Result   Lines and tubes as described. Multifocal airspace disease appears similar to prior exam.         CT CHEST PULMONARY EMBOLISM W CONTRAST   Final Result   No evidence of pulmonary embolism. COVID-19 pneumonia as described above. Irregular right upper lobe mass as measured above. RECOMMENDATIONS:   Chest CT in 3 months is recommended for follow-up evaluation of right upper   lobe irregular lesion. XR CHEST PORTABLE   Final Result   1. Nodular/ill-defined opacity in the right upper lung zone. Recommend   dedicated noncontrast CT for further evaluation and to exclude a nodule. 2. Patchy right-sided pulmonary opacities are noted, which could represent   multifocal pneumonia, atelectasis or atypical edema. 3. Small bilateral pleural fluid.               Discharge Medications     Medication List      START taking these medications    albuterol sulfate  (90 Base) MCG/ACT inhaler  Inhale 2 puffs into the lungs three times daily     amiodarone 200 MG tablet  Commonly known as: CORDARONE  Take 1 tablet by mouth daily     guaiFENesin-dextromethorphan 100-10 MG/5ML syrup  Commonly known as: ROBITUSSIN DM  Take 5 mLs by mouth every 4 hours as needed for Cough     ipratropium-albuterol 0.5-2.5 (3) MG/3ML Soln nebulizer solution  Commonly known as: DUONEB  Inhale 3 mLs into the lungs every 4 hours as needed for Shortness of Breath     metoprolol succinate 200 MG extended release tablet  Commonly known as: TOPROL XL  Take 1 tablet by mouth daily     predniSONE 20 MG tablet  Commonly known as: DELTASONE  Take 1 tablet by mouth daily for 5 days     rivaroxaban 20 MG Tabs tablet  Commonly known as: Xarelto  Take 1 tablet by mouth daily (with breakfast)     valsartan 80 MG tablet  Commonly known as: DIOVAN  Take 1 tablet by mouth daily           Where to Get Your Medications      These medications were sent to [id number] [address] [place], [address] [address]    Phone: [phone]   · albuterol sulfate  (90 Base) MCG/ACT inhaler  · amiodarone 200 MG tablet  · guaiFENesin-dextromethorphan 100-10 MG/5ML syrup  · ipratropium-albuterol 0.5-2.5 (3) MG/3ML Soln nebulizer solution  · metoprolol succinate 200 MG extended release tablet  · predniSONE 20 MG tablet  · rivaroxaban 20 MG Tabs tablet  · valsartan 80 MG tablet           Discharge Condition/Location: Stable    Follow Up: Follow up with PCP.       More than 30 mts spent      [facility], MD 9/23/2021 7:17 AM

## 2021-09-24 LAB
CHOLESTEROL, TOTAL: 222 MG/DL (ref 0–199)
HDLC SERPL-MCNC: 35 MG/DL (ref 40–60)
LDL CHOLESTEROL CALCULATED: 142 MG/DL
TRIGL SERPL-MCNC: 223 MG/DL (ref 0–150)
VLDLC SERPL CALC-MCNC: 45 MG/DL

## 2021-10-11 ENCOUNTER — TELEPHONE (OUTPATIENT)
Dept: PULMONOLOGY | Age: 66
End: 2021-10-11

## 2021-10-18 NOTE — PROGRESS NOTES
CARDIOLOGY FOLLOW-UP VISIT        Patient Name: Luis Angel Collins  Primary Care physician: Julia Au DO  Reason for Referral/Chief complaint: hospital follow for new atrial fibrillation      Subjective:     Luis Angel Collins is a 61 y.o. patient who returns to cardiology clinic today for continued evaluation and management of atrial fibrillation and cardiomyopathy. The patient was last evaluated 9/22/21 at which time he was admitted with COVID 23 and was newly diagnosed with atrial fibrillation and cardiomyopathy. Combinations COVID-19/Flu testing negative as well as COVID-19 rapid and PCR. CT chest showed scattered GGO's, emphysema and RUL nodule. Unfortunately patient's condition deteriorated requiring mechanical intubation. Ultimately diagnosed with streptococcal pneumonia and treated accordingly. 9/15/21 EKG showed atrial fibrillation  bpm for which cardiology is consulted. Echocardiogram showed moderate LV dysfunction with EF 35-40 %. Stress test was negative for inducible ischemia. This was controlled with amiodarone and beta-blocker. Patient was eventually discharged 9/23/2021. Today, he reports that he is doing okay. He presents in wheelchair with [place]. He notes that he has had palpitations for approximately 9 years but has never been diagnosed with atrial fibrillation. He had chest pain and shortness of breath prior going to ED. He presents at baseline 2L NC. He denies any further chest pain since discharge. He has been wrapping his legs for edema. He can't elevate both feet at same time due to hip pain. Elevates when he can. He has compression stockings on order. He plans to see urologist next week for trouble with urination. He has noticed a little lower extremity swelling. Denies paroxysmal nocturnal dyspnea, orthopnea. His weight if accurate has decreased since discharge significantly. Compliant with medications.     Denies palpitations, dizziness, near-syncope or luis syncope. The patient endorses highest level of activity as using a walker to ambulate around home. He has therapy at home twice a week. Home Medications:  Were reviewed and are listed in nursing record and/or below  Prior to Admission medications    Medication Sig Start Date End Date Taking? Authorizing Provider   amiodarone (CORDARONE) 200 MG tablet Take 1 tablet by mouth daily 9/23/21  Yes Jonn Villarreal MD   albuterol sulfate  (90 Base) MCG/ACT inhaler Inhale 2 puffs into the lungs three times daily 9/23/21  Yes Jonn Villarreal MD   ipratropium-albuterol (DUONEB) 0.5-2.5 (3) MG/3ML SOLN nebulizer solution Inhale 3 mLs into the lungs every 4 hours as needed for Shortness of Breath 9/23/21  Yes Jonn Villarreal MD   rivaroxaban (XARELTO) 20 MG TABS tablet Take 1 tablet by mouth daily (with breakfast) 9/23/21  Yes Jonn Villarreal MD   valsartan (DIOVAN) 80 MG tablet Take 1 tablet by mouth daily 9/23/21  Yes Jonn Villarreal MD   metoprolol succinate (TOPROL XL) 200 MG extended release tablet Take 1 tablet by mouth daily 9/23/21 12/22/21 Yes Jonn Villarreal MD   nicotine (Cathryne Pouch) 14 MG/24HR  9/24/21   Historical Provider, MD        CURRENT Medications:  No current facility-administered medications for this visit. Allergies:  Patient has no known allergies. Review of Systems:   A 14 point review of symptoms completed. Pertinent positives identified in the HPI, all other review of symptoms negative.        Objective:     Vitals:    10/20/21 1053 10/20/21 1058   BP: (!) 148/70 (!) 148/70   Pulse: 68    Temp: 97.9 °F (36.6 °C)    SpO2: 98%    Weight: 238 lb 6.4 oz (108.1 kg)    Height: 5' 9.5\" (1.765 m)      Wt Readings from Last 3 Encounters:   10/20/21 238 lb 6.4 oz (108.1 kg)   09/23/21 256 lb 6.4 oz (116.3 kg)          PHYSICAL EXAM:    General:  Alert, cooperative, no distress, appears stated age   Head:  Normocephalic, atraumatic   Eyes:  Conjunctiva/corneas clear, anicteric sclerae    Nose: Nares normal, no drainage or sinus tenderness   Throat: No abnormalities of the lips, oral mucosa or tongue. Neck: Trachea midline. Neck supple with no lymphadenopathy, thyroid not enlarged, symmetric, no tenderness/mass/nodules   Lungs:    Diminished breath sounds throughout, no luis wheezes or rales. Stable on 2 L oxygen without increased work of breathing. Chest Wall:  No deformity or tenderness to palpation   Heart:  Regular rate and rhythm, normal S1, normal S2, no murmur, no rub, no S3/S4, PMI non-displaced. Abdomen:    Obese abdomen, soft, non-tender, with normoactive bowel sounds. No masses, no hepatosplenomegaly   Extremities: No cyanosis, clubbing, 2+ pitting edema to the knees bilaterally. Vascular: 2+ radial, brachial, femoral, dorsalis pedis and posterior tibial pulses bilaterally. Brisk carotid upstrokes without carotid bruit. Skin: Skin color, texture, turgor are normal with no rashes or ulceration. Pysch: Euthymic mood, appropriate affect   Neurologic: Oriented to person, place and time. No slurred speech or facial asymmetry. No motor or sensory deficits on gross examination.          Labs:   CBC:   Lab Results   Component Value Date    WBC 6.0 10/19/2021    RBC 4.38 10/19/2021    HGB 14.0 10/19/2021    HCT 41.1 10/19/2021    MCV 93.9 10/19/2021    RDW 13.6 10/19/2021     10/19/2021     CMP:  Lab Results   Component Value Date     10/19/2021    K 4.0 10/19/2021    K 4.2 09/23/2021    CL 97 10/19/2021    CO2 33 10/19/2021    BUN 7 10/19/2021    CREATININE 0.7 10/19/2021    GFRAA >60 10/19/2021    AGRATIO 1.3 09/23/2021    LABGLOM >60 10/19/2021    GLUCOSE 135 10/19/2021    PROT 6.4 09/23/2021    CALCIUM 8.6 10/19/2021    BILITOT 1.1 09/23/2021    ALKPHOS 70 09/23/2021    AST 60 09/23/2021     09/23/2021     PT/INR:  No results found for: PTINR  HgBA1c:  Lab Results   Component Value Date LABA1C 5.4 09/15/2021     Lab Results   Component Value Date    TROPONINI <0.01 09/21/2021     Lab Results   Component Value Date    CHOL 222 (H) 09/23/2021     Lab Results   Component Value Date    TRIG 223 (H) 09/23/2021    TRIG 214 (H) 09/17/2021     Lab Results   Component Value Date    HDL 35 (L) 09/23/2021     Lab Results   Component Value Date    LDLCALC 142 (H) 09/23/2021     Lab Results   Component Value Date    LABVLDL 45 09/23/2021     No results found for: CHOLHDLRATIO      Interval Testing/Data:        ECHO 9/14/2021:   Summary  Right ventricular systolic function is mildly to moderately reduced . The right atrium is mildly dilated. Mild posterior mitral annular calcification is present. Aortic valve sclerosis without aortic stenosis. Normal systolic pulmonary artery pressure (SPAP) estimated at 33 mmHg (RA pressure 15 mmHg). Left ventricular systolic function is moderately reduced with ejection fraction estimated at 35-40 %. There is moderate global hypokinesis present. Left ventricle size is normal.  There is mild concentric left ventricular hypertrophy. Elevated left ventricular diastolic filling pressure: Septal E/e'' = 12.2 . LEXISCAN: 9/14/21  Summary  Normal LV function. There is normal isotope uptake at stress and rest. There is no evidence of  myocardial ischemia or scar. Low risk study      CT 9/2021  FINDINGS:   Pulmonary Arteries: Pulmonary arteries are adequately opacified for   evaluation.  No evidence of intraluminal filling defect to suggest pulmonary   embolism.  Main pulmonary artery is normal in caliber.       Mediastinum: The central airways are clear.  Right hilar lymphadenopathy   measuring 2.3 x 1.1 cm (2, 135).  The heart and pericardium demonstrate no   acute abnormality.  Atherosclerotic disease of the thoracic aorta.       Lungs/pleura: Irregular right upper lobe nodule measures 2.4 x 1.5 cm (2,   51).   Right lower lobe consolidation and scattered bilateral ground-glass   opacities, consistent with COVID-19 pneumonia.  Panlobular emphysema.  No   pleural effusion or pneumothorax.       Upper Abdomen: Right adrenal gland nodule measuring 1.6 cm.  Left adrenal   gland hyperplasia.  Rest of visualized upper abdomen is unremarkable.       Soft Tissues/Bones: Degenerate disease of the thoracic spine.  No focal   osseous lesion.  Visualized soft tissues are unremarkable. Impression and Plan      1. Atrial fibrillation, new diagnosis -rate controlled, adequate  2. Cardiomyopathy with BiV dysfunction, moderate LV dysfunction with EF 35 to 40%  3. Acute on chronic congestive heart failure with reduced EF   4. Chronic hypoxic respiratory failure    5. COPD  6. Recent sepsis due to streptococcal pneumonia, recovered  7. Lung lesion, 2.4 x 1.5, CT 3 months recommended      Patient Active Problem List   Diagnosis    Acute respiratory failure with hypoxia (HCC)    Multifocal pneumonia    Suspected COVID-19 virus infection    Septicemia ([address]    Acute on chronic respiratory failure with hypoxia ([address]    Pneumonia due to COVID-19 virus    Atrial fibrillation with RVR (HCC)    Cardiomyopathy ([address]    Biventricular heart failure ([address]    Essential hypertension       PLAN:  1. Start Lasix 20 mg daily  2. Recheck BMP and BNP in 2 weeks  3. Continue metoprolol and valsartan for GDMT. Consider transition to [facility] next visit  4. Continue beta-blocker and amiodarone for atrial fibrillation. Will consider weaning amiodarone next visit once we have him diuresed    Plan to follow up in 4 weeks. This note was scribed in the presence of Ting Pro MD by Yue Granado RN. The scribes documentation has been prepared under my direction and personally reviewed by me in its entirety. I confirm that the note above accurately reflects all work, treatment, procedures, and medical decision making performed by me.   Sharonda Downs MD, personally performed the services described in this documentation as scribed by Estefani Taylor RN. in my presence, and it is both accurate and complete to the best of our ability. I will address the patient's cardiac risk factors and adjusted pharmacologic treatment as needed. In addition, I have reinforced the need for patient directed risk factor modification. All questions and concerns were addressed to the patient/family. Alternatives to my treatment were discussed. Thank you for allowing us to participate in the care of [employer]. Please call me with any questions [phone].     Hali Loco MD, [facility]  Cardiovascular Disease  [facility]  [phone] [facility]  [phone] [facility]  10/20/2021 11:03 AM

## 2021-10-19 ENCOUNTER — HOSPITAL ENCOUNTER (OUTPATIENT)
Age: 66
Setting detail: SPECIMEN
Discharge: HOME | End: 2021-10-19
Payer: COMMERCIAL

## 2021-10-19 LAB
ANION GAP SERPL CALCULATED.3IONS-SCNC: 9 MMOL/L (ref 3–16)
BASOPHILS ABSOLUTE: 0 K/UL (ref 0–0.2)
BASOPHILS RELATIVE PERCENT: 0.6 %
BILIRUBIN URINE: NEGATIVE
BLOOD, URINE: NEGATIVE
BUN BLDV-MCNC: 7 MG/DL (ref 7–20)
CALCIUM SERPL-MCNC: 8.6 MG/DL (ref 8.3–10.6)
CHLORIDE BLD-SCNC: 97 MMOL/L (ref 99–110)
CLARITY: CLEAR
CO2: 33 MMOL/L (ref 21–32)
COLOR: YELLOW
CREAT SERPL-MCNC: 0.7 MG/DL (ref 0.8–1.3)
EOSINOPHILS ABSOLUTE: 0.2 K/UL (ref 0–0.6)
EOSINOPHILS RELATIVE PERCENT: 3.1 %
GFR AFRICAN AMERICAN: >60
GFR NON-AFRICAN AMERICAN: >60
GLUCOSE BLD-MCNC: 135 MG/DL (ref 70–99)
GLUCOSE URINE: NEGATIVE MG/DL
HCT VFR BLD CALC: 41.1 % (ref 40.5–52.5)
HEMOGLOBIN: 14 G/DL (ref 13.5–17.5)
KETONES, URINE: NEGATIVE MG/DL
LEUKOCYTE ESTERASE, URINE: NEGATIVE
LYMPHOCYTES ABSOLUTE: 2.1 K/UL (ref 1–5.1)
LYMPHOCYTES RELATIVE PERCENT: 35.7 %
MCH RBC QN AUTO: 31.9 PG (ref 26–34)
MCHC RBC AUTO-ENTMCNC: 34 G/DL (ref 31–36)
MCV RBC AUTO: 93.9 FL (ref 80–100)
MICROSCOPIC EXAMINATION: NORMAL
MONOCYTES ABSOLUTE: 0.7 K/UL (ref 0–1.3)
MONOCYTES RELATIVE PERCENT: 12.2 %
NEUTROPHILS ABSOLUTE: 2.9 K/UL (ref 1.7–7.7)
NEUTROPHILS RELATIVE PERCENT: 48.4 %
NITRITE, URINE: NEGATIVE
PDW BLD-RTO: 13.6 % (ref 12.4–15.4)
PH UA: 6.5 (ref 5–8)
PLATELET # BLD: 198 K/UL (ref 135–450)
PMV BLD AUTO: 7.3 FL (ref 5–10.5)
POTASSIUM SERPL-SCNC: 4 MMOL/L (ref 3.5–5.1)
PROLACTIN: 9 NG/ML
PROTEIN UA: NEGATIVE MG/DL
RBC # BLD: 4.38 M/UL (ref 4.2–5.9)
SODIUM BLD-SCNC: 139 MMOL/L (ref 136–145)
SPECIFIC GRAVITY UA: 1.01 (ref 1–1.03)
URINE TYPE: NORMAL
UROBILINOGEN, URINE: 0.2 E.U./DL
WBC # BLD: 6 K/UL (ref 4–11)

## 2021-10-19 PROCEDURE — 85025 COMPLETE CBC W/AUTO DIFF WBC: CPT

## 2021-10-19 PROCEDURE — 84146 ASSAY OF PROLACTIN: CPT

## 2021-10-19 PROCEDURE — 80048 BASIC METABOLIC PNL TOTAL CA: CPT

## 2021-10-19 PROCEDURE — 81003 URINALYSIS AUTO W/O SCOPE: CPT

## 2021-10-19 PROCEDURE — 36415 COLL VENOUS BLD VENIPUNCTURE: CPT

## 2021-10-20 ENCOUNTER — OFFICE VISIT (OUTPATIENT)
Dept: CARDIOLOGY CLINIC | Age: 66
End: 2021-10-20
Payer: COMMERCIAL

## 2021-10-20 VITALS
BODY MASS INDEX: 34.13 KG/M2 | WEIGHT: 238.4 LBS | HEART RATE: 68 BPM | DIASTOLIC BLOOD PRESSURE: 70 MMHG | HEIGHT: 70 IN | SYSTOLIC BLOOD PRESSURE: 148 MMHG | OXYGEN SATURATION: 98 % | TEMPERATURE: 97.9 F

## 2021-10-20 DIAGNOSIS — I10 ESSENTIAL HYPERTENSION: ICD-10-CM

## 2021-10-20 DIAGNOSIS — J12.82 PNEUMONIA DUE TO COVID-19 VIRUS: ICD-10-CM

## 2021-10-20 DIAGNOSIS — I48.91 ATRIAL FIBRILLATION WITH RVR (HCC): Primary | ICD-10-CM

## 2021-10-20 DIAGNOSIS — I50.82 BIVENTRICULAR HEART FAILURE (HCC): ICD-10-CM

## 2021-10-20 DIAGNOSIS — I42.9 CARDIOMYOPATHY, UNSPECIFIED TYPE (HCC): ICD-10-CM

## 2021-10-20 DIAGNOSIS — U07.1 PNEUMONIA DUE TO COVID-19 VIRUS: ICD-10-CM

## 2021-10-20 PROCEDURE — 1111F DSCHRG MED/CURRENT MED MERGE: CPT | Performed by: INTERNAL MEDICINE

## 2021-10-20 PROCEDURE — G8484 FLU IMMUNIZE NO ADMIN: HCPCS | Performed by: INTERNAL MEDICINE

## 2021-10-20 PROCEDURE — G8417 CALC BMI ABV UP PARAM F/U: HCPCS | Performed by: INTERNAL MEDICINE

## 2021-10-20 PROCEDURE — 4004F PT TOBACCO SCREEN RCVD TLK: CPT | Performed by: INTERNAL MEDICINE

## 2021-10-20 PROCEDURE — 3017F COLORECTAL CA SCREEN DOC REV: CPT | Performed by: INTERNAL MEDICINE

## 2021-10-20 PROCEDURE — G8427 DOCREV CUR MEDS BY ELIG CLIN: HCPCS | Performed by: INTERNAL MEDICINE

## 2021-10-20 PROCEDURE — 99214 OFFICE O/P EST MOD 30 MIN: CPT | Performed by: INTERNAL MEDICINE

## 2021-10-20 RX ORDER — FUROSEMIDE 20 MG/1
20 TABLET ORAL DAILY
Qty: 30 TABLET | Refills: 5 | Status: SHIPPED | OUTPATIENT
Start: 2021-10-20 | End: 2021-12-17 | Stop reason: SDUPTHER

## 2021-10-20 RX ORDER — NICOTINE 21 MG/24HR
PATCH, TRANSDERMAL 24 HOURS TRANSDERMAL
COMMUNITY
Start: 2021-09-24 | End: 2022-06-27 | Stop reason: ALTCHOICE

## 2021-10-20 NOTE — PATIENT INSTRUCTIONS
PLAN:  1. Start Lasix 20 mg daily I the morning to help with edema  2. Recheck BMP and BNP in 2 weeks    Plan to follow up in 4 weeks. May stop amiodarone at that time and adjust diovan.

## 2021-10-20 NOTE — LETTER
[address] [place]  [address] [place] [address] Drive [address]  Phone: [phone]  Fax: [phone]    Jaun Hurley MD    November 4, 2021     Glory Olmstead DO  [address]    Patient: Zach Art   MR Number: <[id number]>   YOB: 1955   Date of Visit: 10/20/2021       Dear Glory Olmstead: Thank you for referring Zach Art to me for evaluation/treatment. Below are the relevant portions of my assessment and plan of care. If you have questions, please do not hesitate to call me. I look forward to following Josemanuel Guerra along with you.     Sincerely,      Jaun Hurley MD

## 2021-10-25 NOTE — TELEPHONE ENCOUNTER
10/11/21 1:49 PM  Note     Scheduling called PET scan scheduled 10/12/21 was cancelled due to insurance still pending Auth. PET was r/s to 10/26/21 at 7:00pm at [facility].    Patient will need f/u appointment watch for results.

## 2021-10-26 ENCOUNTER — HOSPITAL ENCOUNTER (OUTPATIENT)
Dept: PET IMAGING | Age: 66
Discharge: HOME | End: 2021-10-26
Payer: COMMERCIAL

## 2021-10-26 VITALS — BODY MASS INDEX: 34.66 KG/M2 | HEIGHT: 69 IN | WEIGHT: 234 LBS

## 2021-10-26 DIAGNOSIS — R91.1 RIGHT UPPER LOBE PULMONARY NODULE: ICD-10-CM

## 2021-10-26 PROCEDURE — A9552 F18 FDG: HCPCS | Performed by: INTERNAL MEDICINE

## 2021-10-26 PROCEDURE — 78815 PET IMAGE W/CT SKULL-THIGH: CPT

## 2021-10-26 PROCEDURE — 3430000000 HC RX DIAGNOSTIC RADIOPHARMACEUTICAL: Performed by: INTERNAL MEDICINE

## 2021-10-26 RX ORDER — FLUDEOXYGLUCOSE F 18 200 MCI/ML
13.34 INJECTION, SOLUTION INTRAVENOUS
Status: COMPLETED | OUTPATIENT
Start: 2021-10-26 | End: 2021-10-26

## 2021-10-26 RX ADMIN — FLUDEOXYGLUCOSE F 18 13.34 MILLICURIE: 200 INJECTION, SOLUTION INTRAVENOUS at 19:09

## 2021-10-28 NOTE — PROGRESS NOTES
Pulmonary & Critical Care Medicine ICU Progress Note  CC: Pulmonary Nodule   Referring provider: Follow up from hospital admission 9/14-9/23/21 for CAP with COPD exacerbation and Afib with RVR, required intubation. Interval History:  -  Hospital f/u; 1st time seeing pt in office. Feeling better. Had cut down tobacco but now out of patches. - Saw Dr. Kamila Borges 10/20/21, started Lasix, possible discontinuation of amiodarone in the future. - Here after CT PET to further evaluate abnormal CT     Presenting HPI: 62 yo male with a past medical history of Afib and recent multifocal CAP causing COPD exacerbation and subsequent intubation who presents today for follow up of abnormal CT imaging during admission--irregular 2.4 cm RUL Pulmonary Nodule with right hilar lymphadenopathy, concerning for malignancy. Was d/c'd on 2 L O2, prednisone, & with nicotine patches. Required home health care d/t deconditioning in hospital.      reports that he has been smoking. He has never used smokeless tobacco.    PHYSICAL EXAM:  Pulse 61, resp. rate 18, height 5' 9\" (1.753 m), weight 231 lb 3.2 oz (104.9 kg), SpO2 99 %.' 2 L O2   Constitutional:  No acute distress. HENT:  Oropharynx is clear and moist. No thyromegaly. Eyes:  Conjunctivae are normal. Pupils equal, round, and reactive to light. No scleral icterus. Neck:  No tracheal deviation present. No obvious thyroid mass. CV:  Normal rate, regular rhythm, normal heart sounds. No right ventricular heave. No lower extremity edema. Pulm/Chest:  No wheezes. No rales. Chest wall is not dull to percussion. No accessory muscle usage or stridor. Abdominal:  Soft. Bowel sounds present. No distension or hernia. No tenderness. Musculoskeletal:  No cyanosis. No clubbing. No obvious joint deformity. Lymphatic:  No cervical or supraclavicular adenopathy. Skin:  Skin is warm and dry. No rash or nodules on the exposed extremities. Psychiatric:  Normal mood and affect. Behavior is normal.  No anxiety. Neurological:  Alert, awake and oriented. PERRL. Speech fluent    DATA:  9/14/2021 SARS-CoV-2 NAAT and PCR negative  9/14/2021 blood NGTD  9/15/2021 urine antigens are negative  9/15/2021 tracheal aspirate strep pneumonia    Stress & TTE 9/17/2021  No evidence of myocardial ischemia, low risk study. EF 35-40%  Elevated LV diastolic filling pressure  Normal SPAP estimated at 33 mmHg    9/14/21 CTPA   Pulmonary Arteries: Pulmonary arteries are adequately opacified for   evaluation.  No evidence of intraluminal filling defect to suggest pulmonary   embolism.  Main pulmonary artery is normal in caliber.       Mediastinum: The central airways are clear.  Right hilar lymphadenopathy   measuring 2.3 x 1.1 cm (2, 135).  The heart and pericardium demonstrate no   acute abnormality.  Atherosclerotic disease of the thoracic aorta.       Lungs/pleura: Irregular right upper lobe nodule measures 2.4 x 1.5 cm (2,   51).  Right lower lobe consolidation and scattered bilateral ground-glass   opacities, consistent with COVID-19 pneumonia.  Panlobular emphysema.  No   pleural effusion or pneumothorax.       Upper Abdomen: Right adrenal gland nodule measuring 1.6 cm.  Left adrenal   gland hyperplasia.  Rest of visualized upper abdomen is unremarkable.       Impression   No evidence of pulmonary embolism. COVID-19 pneumonia as described above. Irregular right upper lobe mass as measured above. CT PET 10/26/2021  HEAD/NECK: Severe atherosclerotic change seen in the carotid arteries.  No hypermetabolic nodule in the thyroid gland. CHEST: There is underlying emphysema seen. When compared to prior chest CT there is seen improved aeration of the left lung base and right lung base with no significant consolidation remaining.    There remains a dominant irregular nodule in the right upper lobe, which measures 2.4 cm by 1.6 cm, similar to prior, when measured at a similar level.  Maximum SUV measures 1.71   No hypermetabolic mediastinal adenopathy.  Calcified mediastinal nodes are noted moderate coronary artery calcification is seen.  Aortic valve calcification is seen. ABDOMEN/PELVIS: Thickening and hypodensity seen in the adrenal glands bilaterally, likely due to adenoma or hyperplasia.  No hypermetabolic adrenal mass. Atherosclerotic change seen in abdominal aorta.  No hypermetabolic retroperitoneal adenopathy. There is subtle lobular contour to the liver.  Atherosclerotic change seen in aorta.  No hypermetabolic retroperitoneal adenopathy. There is a focal area of hypermetabolic uptake seen in the prostate posteriorly on the right.  Maximum SUV measures 3.63. Spurring is seen in the spine.  Spurring is seen in the hips   Focal area of increased uptake seen in the soft tissues, posterior to C7-T1, likely due to prior trauma-low-grade strain. .  Similar area of muscular uptake marginates the greater trochanter on the right. Impression   Right upper lobe irregular nodule persists.  While it is not significantly hypermetabolic with maximum SUV measuring 1.71, its underlying appearance is suspicious for an early finding of lung carcinoma. Focal area of hypermetabolic uptake in the prostate posteriorly on the right. Recommend correlation with PSA level.      ASSESSMENT:  · Pulmonary Nodule: RUL 2.4 cm nodule, mild uptake on PET imaging  · Right hilar lymphadenopathy, concerning for malignancy  · Right adrenal nodule 1.6 cm, PET imaging reassuring  · Community acquired pneumonia - streptococcal pneumonia, resolved  · COPD, emphysema on imaging  · Atrial fibrillation with RVR, new diagnosis now rate-controlled, on Amiodarone & Xarelto  · Cardiomyopathy, biventricular dysfunction, EF 35-40% - following with Dr. Scarlet Rausch  · Hypermetabolic area in prostate  · Tobacco abuse, 45 pack year history, (smoking 60+ years), now at 1/2 ppd but was able to get as low as 1 or 2 cigarettes per day   · H/o alcohol abuse   · [place]    PLAN:  CT CHEST no IV dye in 3 months; I offered to schedule CT guided biopsy, patient with strong preference against biopsy at this point -- he has a remote history of 2 pulmonary nodules on the right and doesn't wish to take risk of biopsy at this point   PFT with 6 MWT with CT CHEST   Check oximetry today on room air, if okay then plan ONPO on RA  Will see PCP for referral to urology for abnormal PET imaging of prostate, patient requested to do this through his PCP  Tobacco cessation recommended   · NOK brother (sister-in-law) Luz Elena Jones [phone]     Send message to PCP Tevin Olmstead

## 2021-11-02 ENCOUNTER — OFFICE VISIT (OUTPATIENT)
Dept: PULMONOLOGY | Age: 66
End: 2021-11-02
Payer: COMMERCIAL

## 2021-11-02 VITALS
OXYGEN SATURATION: 99 % | WEIGHT: 231.2 LBS | DIASTOLIC BLOOD PRESSURE: 66 MMHG | BODY MASS INDEX: 34.24 KG/M2 | RESPIRATION RATE: 18 BRPM | SYSTOLIC BLOOD PRESSURE: 132 MMHG | HEART RATE: 61 BPM | HEIGHT: 69 IN

## 2021-11-02 DIAGNOSIS — J43.9 PULMONARY EMPHYSEMA, UNSPECIFIED EMPHYSEMA TYPE (HCC): ICD-10-CM

## 2021-11-02 DIAGNOSIS — R91.1 RIGHT UPPER LOBE PULMONARY NODULE: Primary | ICD-10-CM

## 2021-11-02 PROCEDURE — G8427 DOCREV CUR MEDS BY ELIG CLIN: HCPCS | Performed by: INTERNAL MEDICINE

## 2021-11-02 PROCEDURE — G8484 FLU IMMUNIZE NO ADMIN: HCPCS | Performed by: INTERNAL MEDICINE

## 2021-11-02 PROCEDURE — 3023F SPIROM DOC REV: CPT | Performed by: INTERNAL MEDICINE

## 2021-11-02 PROCEDURE — 99214 OFFICE O/P EST MOD 30 MIN: CPT | Performed by: INTERNAL MEDICINE

## 2021-11-02 PROCEDURE — G8926 SPIRO NO PERF OR DOC: HCPCS | Performed by: INTERNAL MEDICINE

## 2021-11-02 PROCEDURE — 4004F PT TOBACCO SCREEN RCVD TLK: CPT | Performed by: INTERNAL MEDICINE

## 2021-11-02 PROCEDURE — 3017F COLORECTAL CA SCREEN DOC REV: CPT | Performed by: INTERNAL MEDICINE

## 2021-11-02 PROCEDURE — G8417 CALC BMI ABV UP PARAM F/U: HCPCS | Performed by: INTERNAL MEDICINE

## 2021-11-02 RX ORDER — NICOTINE 21 MG/24HR
1 PATCH, TRANSDERMAL 24 HOURS TRANSDERMAL DAILY
Qty: 42 PATCH | Refills: 0 | Status: SHIPPED | OUTPATIENT
Start: 2021-11-02 | End: 2022-06-27 | Stop reason: ALTCHOICE

## 2021-11-02 RX ORDER — TIOTROPIUM BROMIDE 18 UG/1
18 CAPSULE ORAL; RESPIRATORY (INHALATION) DAILY
Qty: 30 CAPSULE | Refills: 5 | Status: SHIPPED | OUTPATIENT
Start: 2021-11-02 | End: 2022-02-16

## 2021-11-02 NOTE — PROGRESS NOTES
Oxygen 96% on RA at rest.  91% at 1 minute of walking  86% at 2 min of walking, applied 2 LPM.  Pt stopped walk due to hip pain.    Recovery was 93-95% on 2 LPM.

## 2021-11-02 NOTE — Clinical Note
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 He is clearly improving. He had a PET which showed very low level of activity in pulmonary nodule along with some activity in prostate. I recommended referral to urology and he said he would discuss with you at his visit later this week.   Thanks, Margarita Nagel

## 2021-11-05 ENCOUNTER — TELEPHONE (OUTPATIENT)
Dept: CARDIOLOGY CLINIC | Age: 66
End: 2021-11-05

## 2021-11-05 NOTE — TELEPHONE ENCOUNTER
I believe review reviewed labs in [facility] from 10/19 at his appointment 10/20. Labs reordered for 2 weeks following start of Lasix.   Thank you

## 2021-12-17 ENCOUNTER — TELEPHONE (OUTPATIENT)
Dept: CARDIOLOGY CLINIC | Age: 66
End: 2021-12-17

## 2021-12-17 ENCOUNTER — OFFICE VISIT (OUTPATIENT)
Dept: CARDIOLOGY CLINIC | Age: 66
End: 2021-12-17
Payer: COMMERCIAL

## 2021-12-17 VITALS
SYSTOLIC BLOOD PRESSURE: 142 MMHG | HEART RATE: 67 BPM | DIASTOLIC BLOOD PRESSURE: 78 MMHG | HEIGHT: 69 IN | OXYGEN SATURATION: 100 % | BODY MASS INDEX: 35.99 KG/M2 | WEIGHT: 243 LBS

## 2021-12-17 DIAGNOSIS — R09.89 BILATERAL CAROTID BRUITS: Primary | ICD-10-CM

## 2021-12-17 DIAGNOSIS — I10 ESSENTIAL HYPERTENSION: ICD-10-CM

## 2021-12-17 DIAGNOSIS — I42.9 CARDIOMYOPATHY, UNSPECIFIED TYPE (HCC): ICD-10-CM

## 2021-12-17 DIAGNOSIS — R09.89 BILATERAL CAROTID BRUITS: ICD-10-CM

## 2021-12-17 DIAGNOSIS — I48.91 ATRIAL FIBRILLATION WITH RVR (HCC): Primary | ICD-10-CM

## 2021-12-17 DIAGNOSIS — J96.21 ACUTE ON CHRONIC RESPIRATORY FAILURE WITH HYPOXIA (HCC): ICD-10-CM

## 2021-12-17 PROCEDURE — G8417 CALC BMI ABV UP PARAM F/U: HCPCS | Performed by: INTERNAL MEDICINE

## 2021-12-17 PROCEDURE — G8427 DOCREV CUR MEDS BY ELIG CLIN: HCPCS | Performed by: INTERNAL MEDICINE

## 2021-12-17 PROCEDURE — 4004F PT TOBACCO SCREEN RCVD TLK: CPT | Performed by: INTERNAL MEDICINE

## 2021-12-17 PROCEDURE — G8484 FLU IMMUNIZE NO ADMIN: HCPCS | Performed by: INTERNAL MEDICINE

## 2021-12-17 PROCEDURE — 99214 OFFICE O/P EST MOD 30 MIN: CPT | Performed by: INTERNAL MEDICINE

## 2021-12-17 PROCEDURE — 3017F COLORECTAL CA SCREEN DOC REV: CPT | Performed by: INTERNAL MEDICINE

## 2021-12-17 RX ORDER — ALBUTEROL SULFATE 90 UG/1
2 AEROSOL, METERED RESPIRATORY (INHALATION) 3 TIMES DAILY
Qty: 18 G | Refills: 3 | Status: CANCELLED | OUTPATIENT
Start: 2021-12-17

## 2021-12-17 RX ORDER — FUROSEMIDE 20 MG/1
20 TABLET ORAL DAILY
Qty: 90 TABLET | Refills: 3 | Status: SHIPPED | OUTPATIENT
Start: 2021-12-17

## 2021-12-17 RX ORDER — VALSARTAN 80 MG/1
80 TABLET ORAL DAILY
Qty: 90 TABLET | Refills: 3 | Status: CANCELLED | OUTPATIENT
Start: 2021-12-17

## 2021-12-17 RX ORDER — AMIODARONE HYDROCHLORIDE 200 MG/1
200 TABLET ORAL DAILY
Qty: 90 TABLET | Refills: 3 | Status: CANCELLED | OUTPATIENT
Start: 2021-12-17

## 2021-12-17 RX ORDER — TIOTROPIUM BROMIDE 18 UG/1
18 CAPSULE ORAL; RESPIRATORY (INHALATION) DAILY
Qty: 30 CAPSULE | Refills: 5 | Status: CANCELLED | OUTPATIENT
Start: 2021-12-17

## 2021-12-17 RX ORDER — METOPROLOL SUCCINATE 200 MG/1
200 TABLET, EXTENDED RELEASE ORAL DAILY
Qty: 90 TABLET | Refills: 3 | Status: SHIPPED | OUTPATIENT
Start: 2021-12-17 | End: 2022-03-17

## 2021-12-17 NOTE — TELEPHONE ENCOUNTER
----- Message from Lex Ayala MD sent at 12/17/2021  1:20 PM EST -----  Can you call patient let him know we want carotid ultrasound as well (I ordered). Reminded of this on going back through [place].  ThanksCLARIBEL

## 2021-12-17 NOTE — PATIENT INSTRUCTIONS
PLAN:  1. Split 200mg Amiodarone table in half. Take 100mg (1/2 tab) for 1 week, then stop taking medication. 2. STOP taking valsartan 80mg   3. START taking Entresto 24-26mg  4. Continue taking metoprolol, lasix and xarelto as prescribed, no changes with these medications today. 5. Echocardiogram in 6 weeks to evaluate heart structure and function   Your provider has ordered testing for further evaluation. An order/prescription has been included in your paper work.  To schedule outpatient testing, contact Central Scheduling by calling [address] [place] [address] ([phone]).   6. Lab work in 2 weeks to evaluate electrolytes and kidney function      Plan to follow up 6 weeks

## 2021-12-17 NOTE — PROGRESS NOTES
CARDIOLOGY FOLLOW-UP VISIT        Patient Name: Justin Pedersen  Primary Care physician: Ela Avilze DO  Reason for Referral/Chief complaint: Hospital follow for new atrial fibrillation      Subjective:     Justin Pedersen is a 61 y.o. patient who returns to cardiology clinic today for continued evaluation and management of atrial fibrillation and cardiomyopathy. The patient was last evaluated 9/22/21 at which time he was admitted with COVID 23 and was newly diagnosed with atrial fibrillation and cardiomyopathy. Combination COVID-19/Flu testing negative as well as COVID-19 rapid and PCR. CT chest showed scattered GGO's, emphysema and RUL nodule. Unfortunately patient's condition deteriorated requiring mechanical intubation. Ultimately diagnosed with streptococcal pneumonia and treated accordingly. 9/15/21 EKG showed atrial fibrillation  bpm for which cardiology is consulted. Echocardiogram showed moderate LV dysfunction with EF 35-40 %. Stress test was negative for inducible ischemia. This was controlled with amiodarone and beta-blocker. Patient was eventually discharged 9/23/2021. Last OV 10/20/21, he presented in wheelchair with his sister [place]. He noted that he has had palpitations for approximately 9 years but had never been diagnosed with atrial fibrillation. He had been wrapping his legs for edema. He could not elevate both feet at same time due to hip pain. Today he presents in a wheelchair with home oxygen and new cough. He sees Dr. Lucille Sanchez with pulmonology, due for a follow up with Chest CT and PFT in January. He notes shortness of breath, with activity. Some dizziness with bending over and position changes. When he experiences this, he uses his oxygen, sits and rests and waits for this to resolve. He also suffers from chronic back and hip pain with bone spurs which he attributes to dec mobility. No chest pain endorsed. No palpitations, pre-syncope or LOC.  Denies paroxysmal nocturnal dyspnea, orthopnea, bendopnea, increasing lower extremity edema or weight gain. The patient is compliant with medications. Cost of medications is affordable. No endorsed side effects. Home Medications:  Were reviewed and are listed in nursing record and/or below  Prior to Admission medications    Medication Sig Start Date End Date Taking? Authorizing Provider   furosemide (LASIX) 20 MG tablet Take 1 tablet by mouth daily 12/17/21  Yes Colleen Ellison MD   rivaroxaban Lorice Hoop) 20 MG TABS tablet Take 1 tablet by mouth daily (with breakfast) 12/17/21  Yes Colleen Ellison MD   metoprolol succinate (TOPROL XL) 200 MG extended release tablet Take 1 tablet by mouth daily 12/17/21 3/17/22 Yes Colleen Ellison MD   sacubitril-valsartan [facility]) 24-26 MG per tablet Take 1 tablet by mouth 2 times daily 12/17/21  Yes Colleen Ellison MD   tiotropium ([place] Flakes) 18 MCG inhalation capsule Inhale 1 capsule into the lungs daily 11/2/21  Yes Sherie Biggs MD   nicotine (NICODERM CQ) 14 MG/24HR  9/24/21  Yes Historical Provider, MD   albuterol sulfate  (90 Base) MCG/ACT inhaler Inhale 2 puffs into the lungs three times daily 9/23/21  Yes Silvia Vincent MD   ipratropium-albuterol (DUONEB) 0.5-2.5 (3) MG/3ML SOLN nebulizer solution Inhale 3 mLs into the lungs every 4 hours as needed for Shortness of Breath 9/23/21  Yes Silvia Vincent MD   nicotine ([place]) 14 MG/24HR Place 1 patch onto the skin daily 11/2/21 12/14/21  Sherie Biggs MD        CURRENT Medications:  No current facility-administered medications for this visit. Allergies:  Patient has no known allergies. Review of Systems:   A 14 point review of symptoms completed. Pertinent positives identified in the HPI, all other review of symptoms negative.        Objective:     Vitals:    12/17/21 1050   BP: (!) 142/78   Pulse: 67   SpO2: 100%   Weight: 243 lb (110.2 kg)   Height: 5' 9\" (1.753 m)     Wt Readings from Last 3 Encounters:   12/17/21 243 lb (110.2 kg)   11/02/21 231 lb 3.2 oz (104.9 kg)   10/26/21 234 lb (106.1 kg)          PHYSICAL EXAM:    General:  Alert, cooperative, no distress, appears older than stated age   Head:  Normocephalic, atraumatic   Eyes:  Conjunctiva/corneas clear, anicteric sclerae    Nose: Nares normal, no drainage or sinus tenderness   Throat: No abnormalities of the lips, oral mucosa or tongue. Neck: Trachea midline. Neck supple with no lymphadenopathy, thyroid not enlarged, symmetric, no tenderness/mass/nodules   Lungs:    Diminished breath sounds throughout, no luis wheezes or rales. Stable on 2 L oxygen without increased work of breathing. Chest Wall:  No deformity or tenderness to palpation   Heart:  Regular rate and rhythm, normal S1, normal S2, no murmur, no rub, no S3/S4, PMI non-palpable   Abdomen:   Obese abdomen, soft, non-tender, with normoactive bowel sounds. No masses, no hepatosplenomegaly   Extremities: No cyanosis, clubbing, 1+ pitting edema on L, trace on R to the knees bilaterally. Varicose veins bilaterally   Vascular: 2+ radial, brachial, femoral, dorsalis pedis and posterior tibial pulses bilaterally. Brisk carotid upstrokes bilateral carotid bruits R louder than L. Skin: Skin color, texture, turgor are normal with no rashes or ulceration. Pysch: Euthymic mood, appropriate affect   Neurologic: Oriented to person, place and time. No slurred speech or facial asymmetry. No motor or sensory deficits on gross examination.          Labs:   CBC:   Lab Results   Component Value Date    WBC 6.0 10/19/2021    RBC 4.38 10/19/2021    HGB 14.0 10/19/2021    HCT 41.1 10/19/2021    MCV 93.9 10/19/2021    RDW 13.6 10/19/2021     10/19/2021     CMP:  Lab Results   Component Value Date     10/19/2021    K 4.0 10/19/2021    K 4.2 09/23/2021    CL 97 10/19/2021    CO2 33 10/19/2021    BUN 7 10/19/2021    CREATININE 0.7 10/19/2021    GFRAA >60 10/19/2021    AGRATIO measuring 2.3 x 1.1 cm (2, 135).  The heart and pericardium demonstrate no   acute abnormality.  Atherosclerotic disease of the thoracic aorta.       Lungs/pleura: Irregular right upper lobe nodule measures 2.4 x 1.5 cm (2,   51).  Right lower lobe consolidation and scattered bilateral ground-glass   opacities, consistent with COVID-19 pneumonia.  Panlobular emphysema.  No   pleural effusion or pneumothorax.       Upper Abdomen: Right adrenal gland nodule measuring 1.6 cm.  Left adrenal   gland hyperplasia.  Rest of visualized upper abdomen is unremarkable.       Soft Tissues/Bones: Degenerate disease of the thoracic spine.  No focal   osseous lesion.  Visualized soft tissues are unremarkable. Impression and Plan      1. Atrial fibrillation, paroxysmal - maintaining sinus rhythm by exam   2. Cardiomyopathy with BiV dysfunction, moderate LV dysfunction with EF 35 to 40%  3. Acute on chronic congestive heart failure with reduced EF  4. Chronic hypoxic respiratory failure    5. COPD  6. Recent sepsis due to streptococcal pneumonia, recovered  7. Lung lesion, 2.4 x 1.5, CT 3 months recommended      Patient Active Problem List   Diagnosis    Acute respiratory failure with hypoxia (HCC)    Multifocal pneumonia    Suspected COVID-19 virus infection    Septicemia ([address]    Acute on chronic respiratory failure with hypoxia ([address]    Pneumonia due to COVID-19 virus    Atrial fibrillation with RVR (HCC)    Cardiomyopathy ([address]    Biventricular heart failure ([address]    Essential hypertension       PLAN:  1. Split 200mg Amiodarone table in half. Take 100mg (1/2 tab) for 1 week, then stop taking medication. 2. STOP taking valsartan 80mg   3. START taking Entresto 24-26mg  4. Continue taking metoprolol, lasix and xarelto as prescribed, no changes with these medications today. 5. Echocardiogram in 6 weeks to evaluate heart structure and function  6.  Lab work in 2 weeks to evaluate electrolytes and kidney function, ensure tolerating entresto   7. Monitor weight gain; daily weights. 8. Low sodium diet discussed  9. We encouraged modest weight loss through implementing appropriate dietary measures as well as initiation of a graded exercise program with the ultimate goal of 150 minutes of aerobic exercise weekly; noted limitations to mobility as above. Plan to follow up 6 weeks    Scribe's attestation: This note was scribed in the presence of Dr. Rondall Moritz, M.D. By Shanique Ryan RN    The scribes documentation has been prepared under my direction and personally reviewed by me in its entirety. I confirm that the note above accurately reflects all work, treatment, procedures, and medical decision making performed by me. Lian Nazario MD, personally performed the services described in this documentation as scribed by Shanique Ryan RN in my presence, and it is both accurate and complete to the best of our ability. I will address the patient's cardiac risk factors and adjusted pharmacologic treatment as needed. In addition, I have reinforced the need for patient directed risk factor modification. All questions and concerns were addressed to the patient/family. Alternatives to my treatment were discussed. Thank you for allowing us to participate in the care of Mariaa Chang. Please call me with any questions [phone].     Ramírez Rutherford MD, [facility]  Cardiovascular Disease  [place] [address]  [phone] [facility]  [phone] [address] [place] [address]  12/21/2021 12:07 PM

## 2022-01-19 ENCOUNTER — TELEPHONE (OUTPATIENT)
Dept: PULMONOLOGY | Age: 67
End: 2022-01-19

## 2022-01-19 NOTE — TELEPHONE ENCOUNTER
PFT on 2/8/22 cancelled due to dept closed. L/M asking pt to return call to inform. Can move CT to later time same day if available, as Dr. Brayden Sanders doesn't have earlier openings.

## 2022-02-01 NOTE — TELEPHONE ENCOUNTER
Please schedule CT & f/u with Dr. Sapphire Tomas in the next 1-3 weeks. If pt prefers it be kept in April, please send letter. none

## 2022-02-08 ENCOUNTER — TELEPHONE (OUTPATIENT)
Dept: PULMONOLOGY | Age: 67
End: 2022-02-08

## 2022-02-08 NOTE — TELEPHONE ENCOUNTER
Patient did not show for follow up appt appointment  with Dr Erlinda Hernández on 2/8/22    Same Day Cancellation: No    Patient rescheduled:  No    New appointment: n/a    Patient was also no show on: n/a first no show    LOV 11/02/2021      ASSESSMENT:  · Pulmonary Nodule: RUL 2.4 cm nodule, mild uptake on PET imaging  · Right hilar lymphadenopathy, concerning for malignancy  · Right adrenal nodule 1.6 cm, PET imaging reassuring  · Community acquired pneumonia - streptococcal pneumonia, resolved  · COPD, emphysema on imaging  · Atrial fibrillation with RVR, new diagnosis now rate-controlled, on Amiodarone & Xarelto  · Cardiomyopathy, biventricular dysfunction, EF 35-40% - following with Dr. Marj Nicolas  · Hypermetabolic area in prostate  · Tobacco abuse, 45 pack year history, (smoking 60+ years), now at 1/2 ppd but was able to get as low as 1 or 2 cigarettes per day   · H/o alcohol abuse   ·      PLAN:  · CT CHEST no IV dye in 3 months; I offered to schedule CT guided biopsy, patient with strong preference against biopsy at this point -- he has a remote history of 2 pulmonary nodules on the right and doesn't wish to take risk of biopsy at this point   · PFT with 6 MWT with CT CHEST   · Check oximetry today on room air, if okay then plan ONPO on RA  · Will see PCP for referral to urology for abnormal PET imaging of prostate, patient requested to do this through his PCP  · Tobacco cessation recommended   · MAXINE brother (sister-in-law) Kirby Marking [phone]

## 2022-02-11 ENCOUNTER — HOSPITAL ENCOUNTER (OUTPATIENT)
Dept: CT IMAGING | Age: 67
Discharge: HOME | End: 2022-02-11
Payer: MEDICARE

## 2022-02-11 DIAGNOSIS — R91.1 RIGHT UPPER LOBE PULMONARY NODULE: ICD-10-CM

## 2022-02-11 DIAGNOSIS — R07.1 CHEST PAIN ON BREATHING: ICD-10-CM

## 2022-02-11 DIAGNOSIS — R06.02 SHORTNESS OF BREATH: Primary | ICD-10-CM

## 2022-02-11 PROCEDURE — 71250 CT THORAX DX C-: CPT

## 2022-02-16 ENCOUNTER — TELEPHONE (OUTPATIENT)
Dept: PULMONOLOGY | Age: 67
End: 2022-02-16

## 2022-02-16 ENCOUNTER — TELEMEDICINE (OUTPATIENT)
Dept: PULMONOLOGY | Age: 67
End: 2022-02-16
Payer: MEDICARE

## 2022-02-16 DIAGNOSIS — J44.9 CHRONIC OBSTRUCTIVE PULMONARY DISEASE, UNSPECIFIED COPD TYPE (HCC): Primary | ICD-10-CM

## 2022-02-16 DIAGNOSIS — R91.1 PULMONARY NODULE: ICD-10-CM

## 2022-02-16 PROCEDURE — 3017F COLORECTAL CA SCREEN DOC REV: CPT | Performed by: INTERNAL MEDICINE

## 2022-02-16 PROCEDURE — G8484 FLU IMMUNIZE NO ADMIN: HCPCS | Performed by: INTERNAL MEDICINE

## 2022-02-16 PROCEDURE — 4040F PNEUMOC VAC/ADMIN/RCVD: CPT | Performed by: INTERNAL MEDICINE

## 2022-02-16 PROCEDURE — G8417 CALC BMI ABV UP PARAM F/U: HCPCS | Performed by: INTERNAL MEDICINE

## 2022-02-16 PROCEDURE — 1123F ACP DISCUSS/DSCN MKR DOCD: CPT | Performed by: INTERNAL MEDICINE

## 2022-02-16 PROCEDURE — 99214 OFFICE O/P EST MOD 30 MIN: CPT | Performed by: INTERNAL MEDICINE

## 2022-02-16 PROCEDURE — 3023F SPIROM DOC REV: CPT | Performed by: INTERNAL MEDICINE

## 2022-02-16 PROCEDURE — G8427 DOCREV CUR MEDS BY ELIG CLIN: HCPCS | Performed by: INTERNAL MEDICINE

## 2022-02-16 PROCEDURE — 4004F PT TOBACCO SCREEN RCVD TLK: CPT | Performed by: INTERNAL MEDICINE

## 2022-02-16 RX ORDER — IPRATROPIUM BROMIDE AND ALBUTEROL SULFATE 2.5; .5 MG/3ML; MG/3ML
3 SOLUTION RESPIRATORY (INHALATION) EVERY 4 HOURS PRN
Qty: 360 ML | Refills: 2 | Status: SHIPPED | OUTPATIENT
Start: 2022-02-16 | End: 2022-10-18 | Stop reason: SDUPTHER

## 2022-02-16 RX ORDER — ALBUTEROL SULFATE 90 UG/1
2 AEROSOL, METERED RESPIRATORY (INHALATION) EVERY 4 HOURS PRN
Qty: 18 G | Refills: 5 | Status: SHIPPED | OUTPATIENT
Start: 2022-02-16 | End: 2022-10-17 | Stop reason: SDUPTHER

## 2022-02-16 RX ORDER — FLUTICASONE FUROATE, UMECLIDINIUM BROMIDE AND VILANTEROL TRIFENATATE 100; 62.5; 25 UG/1; UG/1; UG/1
1 POWDER RESPIRATORY (INHALATION) DAILY
Qty: 1 EACH | Refills: 5 | Status: SHIPPED | OUTPATIENT
Start: 2022-02-16 | End: 2022-10-28 | Stop reason: SDUPTHER

## 2022-02-16 NOTE — TELEPHONE ENCOUNTER
Within this Telehealth Consent, the terms you and yours refer to the person using the Telehealth Service (Service), or in the case of a use of the Service by or on behalf of a minor, you and yours refer to and include (i) the parent or legal guardian who provides consent to the use of the Service by such minor or uses the Service on behalf of such minor, and (ii) the minor for whom consent is being provided or on whose behalf the Service is being utilized. When using Service, you will be consulting with your health care providers via the use of Telehealth.   Telehealth involves the delivery of healthcare services using electronic communications, information technology or other means between a healthcare provider and a patient who are not in the same physical location. Telehealth may be used for diagnosis, treatment, follow-up and/or patient education, and may include, but is not limited to, one or more of the following:    Electronic transmission of medical records, photo images, personal health information or other data between a patient and a healthcare provider    Interactions between a patient and healthcare provider via audio, video and/or data communications    Use of output data from medical devices, sound and video files    Anticipated Benefits   The use of Telehealth by your Provider(s) through the Service may have the following possible benefits:    Making it easier and more efficient for you to access medical care and treatment for the conditions treated by such Provider(s) utilizing the Service    Allowing you to obtain medical care and treatment by Provider(s) at times that are convenient for you    Enabling you to interact with Provider(s) without the necessity of an in-office appointment     Possible Risks   While the use of Telehealth can provide potential benefits for you, there are also potential risks associated with the use of Telehealth.  These risks include, but may not be limited to the following:    Your Provider(s) may not able to provide medical treatment for your particular condition and you may be required to seek alternative healthcare or emergency care services.  The electronic systems or other security protocols or safeguards used in the Service could fail, causing a breach of privacy of your medical or other information.  Given regulatory requirements in certain jurisdictions, your Provider(s) diagnosis and/or treatment options, especially pertaining to certain prescriptions, may be limited. Acceptance   1. You understand that Services will be provided via Telehealth. This process involves the use of HIPAA compliant and secure, real-time audio-visual interfacing with a qualified and appropriately trained provider located at [facility]. 2. You understand that, under no circumstances, will this session be recorded. 3. You understand that the Provider(s) at [facility] and other clinical participants will be party to the information obtained during the Telehealth session in accordance with best medical practices. 4. You understand that the information obtained during the Telehealth session will be used to help determine the most appropriate treatment options. 5. You understand that You have the right to revoke this consent at any point in time. 6. You understand that Telehealth is voluntary, and that continued treatment is not dependent upon consent. 7. You understand that, in the event of non-consent to Telehealth services and/or technical difficulties, you will obtain services as typically provided in the absence of Telehealth technology. 8. You understand that this consent will be kept in Your medical record. 9. No potential benefits from the use of Telehealth or specific results can be guaranteed. Your condition may not be cured or improved and, in some cases, may get worse.    10. There are limitations in the provision of medical care and treatment via Telehealth and the Service and you may not be able to receive diagnosis and/or treatment through the Service for every condition for which you seek diagnosis and/or treatment. 11. There are potential risks to the use of Telehealth, including but not limited to the risks described in this Telehealth Consent. 12. Your Provider(s) have discussed the use of Telehealth and the Service with you, including the benefits and risks of such and you have provided oral consent to your Provider(s) for the use of Telehealth and the Service. 15. You understand that it is your duty to provide your Provider(s) truthful, accurate and complete information, including all relevant information regarding care that you may have received or may be receiving from other healthcare providers outside of the Service. 14. You understand that each of your Provider(s) may determine in his or sole discretion that your condition is not suitable for diagnosis and/or treatment using the Service, and that you may need to seek medical care and treatment a specialist or other healthcare provider, outside of the Service. 15. You understand that you are fully responsible for payment for all services provided by Provider(s) or through use of the Service and that you may not be able to use third-party insurance. 16. You represent that (a) you have read this Telehealth Consent carefully, (b) you understand the risks and benefits of the Service and the use of Telehealth in the medical care and treatment provided to you by Provider(s) using the Service, and (c) you have the legal capacity and authority to provide this consent for yourself and/or the minor for which you are consenting under applicable federal and state laws, including laws relating to the age of [de-identified] and/or parental/guardian consent.    17. You give your informed consent to the use of Telehealth by Provider(s) using the Service under the terms described in the Terms of Service and this Telehealth Consent. The patient was read the following statement and has consented to the visit as of 2/16/22. The patient has been scheduled for their first telehealth visit on 2/16/22 with Dr. Rene Vaca.

## 2022-02-16 NOTE — PROGRESS NOTES
Pulmonary & Critical Care Medicine ICU Progress Note  CC: Pulmonary Nodule   Referring provider: Hospital f/u    Interval History: 2/16/2022  - Qualified for 2 L O2 after 2 mins ambulation LOV  - Dr. Neena Richard d/c'd Amiodarone & Valsartan. Pt did not show for ECHO. - Here for 3 mo f/u CT Chest completed 2/11/22. Presenting HPI: 60 yo male with PMHx of Afib & recent multifocal CAP causing COPD exacerbation & subsequent intubation (admitted 9/14 - 9/23/21) who presents today for f/u of abnormal CT imaging during admission--irregular 2.4 cm RUL Pulmonary Nodule with right hilar lymphadenopathy, concerning for malignancy. Was d/c'd on 2 L O2, prednisone, & with nicotine patches. Required home health care d/t deconditioning in hospital.      reports that he has been smoking. He has never used smokeless tobacco.    PHYSICAL EXAM:  There were no vitals taken for this visit.' 2 L O2   VIRTUAL  Constitutional:  NAD, NCAT  Eyes: EOM intact. Anicteric. HENT: Nose, ears, neck normal, trachea midline   Respiratory: No ASM, no obvious wheezing  Cardiovascular: No obvious peripheral edema. Skin: No visible rash or nodule on visible face, upper thorax    Psychiatric: No anxiety or agitation. Neuro: A&O to P/P/E, judgement and insight intact    DATA:  9/14/2021 SARS-CoV-2 NAAT and PCR negative  9/14/2021 blood NGTD  9/15/2021 urine antigens are negative  9/15/2021 tracheal aspirate strep pneumonia    Stress & TTE 9/17/2021  No evidence of myocardial ischemia, low risk study. EF 35-40%  Elevated LV diastolic filling pressure  Normal SPAP estimated at 33 mmHg    CT PET 10/26/2021  HEAD/NECK: Severe atherosclerotic change seen in the carotid arteries.  No hypermetabolic nodule in the thyroid gland. CHEST: There is underlying emphysema seen. When compared to prior chest CT there is seen improved aeration of the left lung base and right lung base with no significant consolidation remaining.    There remains a dominant irregular nodule in the right upper lobe, which measures 2.4 cm by 1.6 cm, similar to prior, when measured at a similar level.  Maximum SUV measures 1.71   No hypermetabolic mediastinal adenopathy.  Calcified mediastinal nodes are noted moderate coronary artery calcification is seen.  Aortic valve calcification is seen. ABDOMEN/PELVIS: Thickening and hypodensity seen in the adrenal glands bilaterally, likely due to adenoma or hyperplasia.  No hypermetabolic adrenal mass. Atherosclerotic change seen in abdominal aorta.  No hypermetabolic retroperitoneal adenopathy. There is subtle lobular contour to the liver.  Atherosclerotic change seen in aorta.  No hypermetabolic retroperitoneal adenopathy. There is a focal area of hypermetabolic uptake seen in the prostate posteriorly on the right.  Maximum SUV measures 3.63. Spurring is seen in the spine.  Spurring is seen in the hips   Focal area of increased uptake seen in the soft tissues, posterior to C7-T1, likely due to prior trauma-low-grade strain. .  Similar area of muscular uptake marginates the greater trochanter on the right. Impression   Right upper lobe irregular nodule persists.  While it is not significantly hypermetabolic with maximum SUV measuring 1.71, its underlying appearance is suspicious for an early finding of lung carcinoma. Focal area of hypermetabolic uptake in the prostate posteriorly on the right. Recommend correlation with PSA level. CT Chest 2/11/2022  Mediastinum: Thyroid gland appears normal.  Atherosclerotic change seen in   aorta.  Small calcified and noncalcified mediastinal nodes are noted. Moderate to severe coronary artery calcification is seen.  Aortic valve   calcification is seen.  Trace pericardial fluid is seen.  Small hiatal hernia   seen. Radha Nuha is nonspecific thickening at the GE junction.       Lungs/pleura:  Moderate to severe underlying emphysema is seen.  Scattered   areas of bronchial wall thickening are seen. Radha Nuha is improved aeration of   the left lower lobe compared to prior conventional chest CT.  A bandlike   opacity in the lingula appears similar.       There is improved aeration of the right lower lobe compared to prior   conventional chest CT.  No focal consolidation seen on the right.       There remains a dominant irregular nodule in the right upper lobe.  This   measures 2.4 x 1.5 cm, similar to prior, when measured in a similar fashion. The inferior portion appears spiculated.  The superior portion appears   bandlike. Impression   Persistent irregular nodule in the right upper lobe, with lack of resolution   and irregular appearance raising the question of early lung carcinoma.       Areas of consolidative change seen on prior chest CT 09/14/2021 have since   resolved.       RECOMMENDATIONS: Unavailable     ASSESSMENT:  · Pulmonary Nodule: RUL 2.4 cm nodule, mild uptake on PET imaging, stable Sept 2021 to Feb 2022  · Right adrenal nodule 1.6 cm, PET imaging reassuring  · COPD, emphysema  · Shortness of breath   · Atrial fibrillation with RVR  · Cardiomyopathy, biventricular dysfunction, EF 35-40% - following with Dr. Alexy Knapp  · Tobacco abuse, 45 pack year history, (smoking 60+ years), cutting down with patches   · H/o alcohol abuse   · [place]    PLAN:  CT CHEST no IV dye in 3-4 months - see me after  PFT with 6 MWT with CT CHEST can be done same day as CT   Trelegy daily to replace spiriva 2/2 insurer, albuterol PRN Tobacco cessation recommended   · MAXINE brother (sister-in-law) [address] [phone]     Triston Shetty was evaluated through a synchronous (real-time) audio-video encounter. The patient (or guardian if applicable) is aware that this is a billable service, which includes applicable co-pays. This Virtual Visit was conducted with patient's (and/or legal guardian's) consent.  The visit was conducted pursuant to the emergency declaration under the [address] [place], [facility] waiver authority and the [employer] Act. Patient identification was verified, and a caregiver was present when appropriate. The patient was located in a state where the provider was licensed to provide care.

## 2022-02-25 ENCOUNTER — OFFICE VISIT (OUTPATIENT)
Dept: CARDIOLOGY CLINIC | Age: 67
End: 2022-02-25
Payer: MEDICARE

## 2022-02-25 VITALS
HEART RATE: 57 BPM | OXYGEN SATURATION: 100 % | HEIGHT: 69 IN | DIASTOLIC BLOOD PRESSURE: 78 MMHG | WEIGHT: 243 LBS | SYSTOLIC BLOOD PRESSURE: 136 MMHG | BODY MASS INDEX: 35.99 KG/M2

## 2022-02-25 DIAGNOSIS — I10 ESSENTIAL HYPERTENSION: ICD-10-CM

## 2022-02-25 DIAGNOSIS — I50.82 BIVENTRICULAR HEART FAILURE (HCC): ICD-10-CM

## 2022-02-25 DIAGNOSIS — I48.91 ATRIAL FIBRILLATION WITH RVR (HCC): Primary | ICD-10-CM

## 2022-02-25 DIAGNOSIS — I42.9 CARDIOMYOPATHY, UNSPECIFIED TYPE (HCC): ICD-10-CM

## 2022-02-25 PROCEDURE — G8417 CALC BMI ABV UP PARAM F/U: HCPCS | Performed by: INTERNAL MEDICINE

## 2022-02-25 PROCEDURE — 4004F PT TOBACCO SCREEN RCVD TLK: CPT | Performed by: INTERNAL MEDICINE

## 2022-02-25 PROCEDURE — 1123F ACP DISCUSS/DSCN MKR DOCD: CPT | Performed by: INTERNAL MEDICINE

## 2022-02-25 PROCEDURE — G8427 DOCREV CUR MEDS BY ELIG CLIN: HCPCS | Performed by: INTERNAL MEDICINE

## 2022-02-25 PROCEDURE — G8484 FLU IMMUNIZE NO ADMIN: HCPCS | Performed by: INTERNAL MEDICINE

## 2022-02-25 PROCEDURE — 4040F PNEUMOC VAC/ADMIN/RCVD: CPT | Performed by: INTERNAL MEDICINE

## 2022-02-25 PROCEDURE — 99214 OFFICE O/P EST MOD 30 MIN: CPT | Performed by: INTERNAL MEDICINE

## 2022-02-25 PROCEDURE — 3017F COLORECTAL CA SCREEN DOC REV: CPT | Performed by: INTERNAL MEDICINE

## 2022-02-25 NOTE — PATIENT INSTRUCTIONS
PLAN:  1. Lab work today to check your kidney function, electrolytes and fluid level. 2. Continue your current medications, no changes made today. 3. Please continue smoking cessation, we recommend this for improved cardiovascular health.    4. Follow up with your Echocardiogram    Follow up with me in 4 months

## 2022-02-25 NOTE — PROGRESS NOTES
CARDIOLOGY FOLLOW-UP VISIT        Patient Name: Radha Rivers  Primary Care physician: Leo Rodriguez DO  Reason for Referral/Chief complaint: Hospital follow for new atrial fibrillation      Subjective:     Radha Rivers is a 74 y.o. patient who returns to cardiology clinic today for continued evaluation and management of atrial fibrillation and cardiomyopathy. The patient was last evaluated 9/22/21 at which time he was admitted with PNA and was newly diagnosed with atrial fibrillation and cardiomyopathy. Combination COVID-19/Flu testing negative as well as COVID-19 rapid and PCR. CT chest showed scattered GGO's, emphysema and RUL nodule. Unfortunately patient's condition deteriorated requiring mechanical intubation. Ultimately diagnosed with streptococcal pneumonia and treated accordingly. 9/15/21 EKG showed atrial fibrillation  bpm for which cardiology is consulted. Echocardiogram showed moderate LV dysfunction with EF 35-40 %. Stress test was negative for inducible ischemia. This was controlled with amiodarone and beta-blocker. Patient was eventually discharged 9/23/2021. LOV 12/17/21 he presented in a wheelchair with home oxygen and new cough. He saw Dr. Chuy Porras with pulmonology, due for a follow up with Chest CT and PFT in January. He noted shortness of breath, with activity. Today 2/25/22 he presents today in a wheel chair with his sister [place]. He reports that he was unable to get his Limited Echo/labs due to weather conditions. He reports that he has been having increased shortness of breath as of late. Little more leg swelling. Weight he feels has increased. No orthopnea/PND. No abd bloating. [place] palpitations/dizziness/loss consciousness. Denies chest pain. she states the patient is compliant with medications. Cost of medications is affordable. No endorsed side effects.         Home Medications:  Were reviewed and are listed in nursing record and/or below  Prior to Admission medications    Medication Sig Start Date End Date Taking? Authorizing Provider   albuterol sulfate  (90 Base) MCG/ACT inhaler Inhale 2 puffs into the lungs every 4 hours as needed for Wheezing or Shortness of Breath 2/16/22  Yes Derrell Hicks MD   ipratropium-albuterol (DUONEB) 0.5-2.5 (3) MG/3ML SOLN nebulizer solution Inhale 3 mLs into the lungs every 4 hours as needed for Shortness of Breath 2/16/22  Yes Derrell Hicks MD   furosemide (LASIX) 20 MG tablet Take 1 tablet by mouth daily 12/17/21  Yes Kary Flores MD   rivaroxaban (XARELTO) 20 MG TABS tablet Take 1 tablet by mouth daily (with breakfast) 12/17/21  Yes Kary Flores MD   metoprolol succinate (TOPROL XL) 200 MG extended release tablet Take 1 tablet by mouth daily 12/17/21 3/17/22 Yes Kary Flores MD   sacubitril-valsartan (ENTRESTO) 24-26 MG per tablet Take 1 tablet by mouth 2 times daily 12/17/21  Yes Kary Flores MD   nicotine (Kasandra Leer) 14 MG/24HR  9/24/21  Yes Historical Provider, MD   fluticasone-umeclidin-vilant (TRELEGY ELLIPTA) 989-38.2-20 MCG/INH AEPB Inhale 1 puff into the lungs daily  Patient not taking: Reported on 2/25/2022 2/16/22 11/11/24  Derrell Hicks MD   nicotine (Susen Leer) 14 MG/24HR Place 1 patch onto the skin daily 11/2/21 12/14/21  Derrell Hicks MD        CURRENT Medications:  No current facility-administered medications for this visit. Allergies:  Patient has no known allergies. Review of Systems:   A 14 point review of symptoms completed. Pertinent positives identified in the HPI, all other review of symptoms negative.        Objective:     Vitals:    02/25/22 1538   BP: 136/78   Pulse: 57   SpO2: 100%   Weight: 243 lb (110.2 kg)   Height: 5' 9\" (1.753 m)     Wt Readings from Last 3 Encounters:   02/25/22 243 lb (110.2 kg)   12/17/21 243 lb (110.2 kg)   11/02/21 231 lb 3.2 oz (104.9 kg)          PHYSICAL EXAM:    General:  Alert, cooperative, no distress, appears older than stated age   Head: Normocephalic, atraumatic   Eyes:  Conjunctiva/corneas clear, anicteric sclerae    Nose: Nares normal, no drainage or sinus tenderness   Throat: No abnormalities of the lips, oral mucosa or tongue. Neck: Trachea midline. Neck supple with no lymphadenopathy, thyroid not enlarged, symmetric, no tenderness/mass/nodules   Lungs:    Diminished breath sounds throughout, no luis wheezes or rales. Stable on 2 L oxygen without increased work of breathing. Chest Wall:  No deformity or tenderness to palpation   Heart:  Regular rate and rhythm, normal S1, normal S2, no murmur, no rub, no S3/S4, PMI non-palpable   Abdomen:   Obese abdomen, soft, non-tender, with normoactive bowel sounds. No masses, no hepatosplenomegaly   Extremities: No cyanosis, clubbing, Trace pitting edema on L,  No pitting Right. Varicose veins bilaterally   Vascular: 2+ radial, dec dorsalis pedis and posterior tibial pulses bilaterally. Brisk carotid upstrokes bilateral carotid bruits R louder than L. Skin: Skin color, texture, turgor are normal with no rashes or ulceration. Pysch: Euthymic mood, appropriate affect   Neurologic: Oriented to person, place and time. No slurred speech or facial asymmetry. No motor or sensory deficits on gross examination.          Labs:   CBC:   Lab Results   Component Value Date    WBC 6.0 10/19/2021    RBC 4.38 10/19/2021    HGB 14.0 10/19/2021    HCT 41.1 10/19/2021    MCV 93.9 10/19/2021    RDW 13.6 10/19/2021     10/19/2021     CMP:  Lab Results   Component Value Date     10/19/2021    K 4.0 10/19/2021    K 4.2 09/23/2021    CL 97 10/19/2021    CO2 33 10/19/2021    BUN 7 10/19/2021    CREATININE 0.7 10/19/2021    GFRAA >60 10/19/2021    AGRATIO 1.3 09/23/2021    LABGLOM >60 10/19/2021    GLUCOSE 135 10/19/2021    PROT 6.4 09/23/2021    CALCIUM 8.6 10/19/2021    BILITOT 1.1 09/23/2021    ALKPHOS 70 09/23/2021    AST 60 09/23/2021     09/23/2021     PT/INR:  No results found for: PTINR  HgBA1c:  Lab Results   Component Value Date    LABA1C 5.4 09/15/2021     Lab Results   Component Value Date    TROPONINI <0.01 09/21/2021     Lab Results   Component Value Date    CHOL 222 (H) 09/23/2021     Lab Results   Component Value Date    TRIG 223 (H) 09/23/2021    TRIG 214 (H) 09/17/2021     Lab Results   Component Value Date    HDL 35 (L) 09/23/2021     Lab Results   Component Value Date    LDLCALC 142 (H) 09/23/2021     Lab Results   Component Value Date    LABVLDL 45 09/23/2021     No results found for: CHOLHDLRATIO      Interval Testing/Data:     ECHO 9/14/2021:   Summary  Right ventricular systolic function is mildly to moderately reduced . The right atrium is mildly dilated. Mild posterior mitral annular calcification is present. Aortic valve sclerosis without aortic stenosis. Normal systolic pulmonary artery pressure (SPAP) estimated at 33 mmHg (RA pressure 15 mmHg). Left ventricular systolic function is moderately reduced with ejection fraction estimated at 35-40 %. There is moderate global hypokinesis present. Left ventricle size is normal.  There is mild concentric left ventricular hypertrophy. Elevated left ventricular diastolic filling pressure: Septal E/e'' = 12.2 . LEXISCAN: 9/14/21  Summary  Normal LV function. There is normal isotope uptake at stress and rest. There is no evidence of  myocardial ischemia or scar. Low risk study     Additional Studies:    CT chest 2/11/22:  FINDINGS: Mediastinum: Thyroid gland appears normal.  Atherosclerotic change seen in aorta. Small calcified and noncalcified mediastinal nodes are noted. Moderate to severe coronary artery calcification is seen. Aortic valve calcification is seen. Trace pericardial fluid is seen. Small hiatal hernia seen. There is nonspecific thickening at the GE junction.         CT 9/2021  FINDINGS:   Pulmonary Arteries: Pulmonary arteries are adequately opacified for   evaluation.  No evidence of intraluminal filling defect to suggest pulmonary   embolism.  Main pulmonary artery is normal in caliber.       Mediastinum: The central airways are clear.  Right hilar lymphadenopathy   measuring 2.3 x 1.1 cm (2, 135).  The heart and pericardium demonstrate no   acute abnormality.  Atherosclerotic disease of the thoracic aorta.       Lungs/pleura: Irregular right upper lobe nodule measures 2.4 x 1.5 cm (2,   51).  Right lower lobe consolidation and scattered bilateral ground-glass   opacities, consistent with COVID-19 pneumonia.  Panlobular emphysema.  No   pleural effusion or pneumothorax.       Upper Abdomen: Right adrenal gland nodule measuring 1.6 cm.  Left adrenal   gland hyperplasia.  Rest of visualized upper abdomen is unremarkable.       Soft Tissues/Bones: Degenerate disease of the thoracic spine.  No focal   osseous lesion.  Visualized soft tissues are unremarkable. Impression and Plan      1. Atrial fibrillation, paroxysmal - maintaining sinus rhythm by exam   2. Cardiomyopathy with BiV dysfunction, moderate LV dysfunction with EF 35 to 40%  3. Chronic congestive heart failure with reduced EF  4. Chronic hypoxic respiratory failure, 2L   5. COPD  6. Sepsis due to streptococcal pneumonia requiring mechanical ventilation, recovered, 9/2021  7. Lung lesion, 2.4 x 1.5, CT 3 months recommended      Patient Active Problem List   Diagnosis    Acute respiratory failure with hypoxia (HCC)    Multifocal pneumonia    Suspected COVID-19 virus infection    Septicemia ([address]    Acute on chronic respiratory failure with hypoxia ([address]    Pneumonia due to COVID-19 virus    Atrial fibrillation with RVR (HCC)    Cardiomyopathy ([address]    Biventricular heart failure ([address]    Essential hypertension       PLAN:  1. BMP and Pro-BNP  2. Continue your current medications. Metoprolol and Entresto for GDMT. Need to check potassium levels and creatinine prior to further titration.    3.  Does not appear grossly volume

## 2022-02-25 NOTE — LETTER
Desirae Hernandez  1955          CARDIOLOGY FOLLOW-UP VISIT        Patient Name: Desirae Hernandez  Primary Care physician: Sharona Khoury DO  Reason for Referral/Chief complaint: Hospital follow for new atrial fibrillation      Subjective:     Desirae Hernandez is a 74 y.o. patient who returns to cardiology clinic today for continued evaluation and management of atrial fibrillation and cardiomyopathy. The patient was last evaluated 9/22/21 at which time he was admitted with PNA and was newly diagnosed with atrial fibrillation and cardiomyopathy. Combination COVID-19/Flu testing negative as well as COVID-19 rapid and PCR. CT chest showed scattered GGO's, emphysema and RUL nodule. Unfortunately patient's condition deteriorated requiring mechanical intubation. Ultimately diagnosed with streptococcal pneumonia and treated accordingly. 9/15/21 EKG showed atrial fibrillation  bpm for which cardiology is consulted. Echocardiogram showed moderate LV dysfunction with EF 35-40 %. Stress test was negative for inducible ischemia. This was controlled with amiodarone and beta-blocker. Patient was eventually discharged 9/23/2021. LOV 12/17/21 he presented in a wheelchair with home oxygen and new cough. He saw Dr. Saranya Farley with pulmonology, due for a follow up with Chest CT and PFT in January. He noted shortness of breath, with activity. Today 2/25/22 he presents today in a wheel chair with his sister [place]. He reports that he was unable to get his Limited Echo/labs due to weather conditions. He reports that he has been having increased shortness of breath as of late. Little more leg swelling. Weight he feels has increased. No orthopnea/PND. No abd bloating. [place] palpitations/dizziness/loss consciousness. Denies chest pain. she states the patient is compliant with medications. Cost of medications is affordable. No endorsed side effects.         Home Medications:  Were reviewed and are listed in appears older than stated age   Head:  Normocephalic, atraumatic   Eyes:  Conjunctiva/corneas clear, anicteric sclerae    Nose: Nares normal, no drainage or sinus tenderness   Throat: No abnormalities of the lips, oral mucosa or tongue. Neck: Trachea midline. Neck supple with no lymphadenopathy, thyroid not enlarged, symmetric, no tenderness/mass/nodules   Lungs:    Diminished breath sounds throughout, no luis wheezes or rales. Stable on 2 L oxygen without increased work of breathing. Chest Wall:  No deformity or tenderness to palpation   Heart:  Regular rate and rhythm, normal S1, normal S2, no murmur, no rub, no S3/S4, PMI non-palpable   Abdomen:   Obese abdomen, soft, non-tender, with normoactive bowel sounds. No masses, no hepatosplenomegaly   Extremities: No cyanosis, clubbing, Trace pitting edema on L,  No pitting Right. Varicose veins bilaterally   Vascular: 2+ radial, dec dorsalis pedis and posterior tibial pulses bilaterally. Brisk carotid upstrokes bilateral carotid bruits R louder than L. Skin: Skin color, texture, turgor are normal with no rashes or ulceration. Pysch: Euthymic mood, appropriate affect   Neurologic: Oriented to person, place and time. No slurred speech or facial asymmetry. No motor or sensory deficits on gross examination.          Labs:   CBC:   Lab Results   Component Value Date    WBC 6.0 10/19/2021    RBC 4.38 10/19/2021    HGB 14.0 10/19/2021    HCT 41.1 10/19/2021    MCV 93.9 10/19/2021    RDW 13.6 10/19/2021     10/19/2021     CMP:  Lab Results   Component Value Date     10/19/2021    K 4.0 10/19/2021    K 4.2 09/23/2021    CL 97 10/19/2021    CO2 33 10/19/2021    BUN 7 10/19/2021    CREATININE 0.7 10/19/2021    GFRAA >60 10/19/2021    AGRATIO 1.3 09/23/2021    LABGLOM >60 10/19/2021    GLUCOSE 135 10/19/2021    PROT 6.4 09/23/2021    CALCIUM 8.6 10/19/2021    BILITOT 1.1 09/23/2021    ALKPHOS 70 09/23/2021    AST 60 09/23/2021     09/23/2021 PT/INR:  No results found for: PTINR  HgBA1c:  Lab Results   Component Value Date    LABA1C 5.4 09/15/2021     Lab Results   Component Value Date    TROPONINI <0.01 09/21/2021     Lab Results   Component Value Date    CHOL 222 (H) 09/23/2021     Lab Results   Component Value Date    TRIG 223 (H) 09/23/2021    TRIG 214 (H) 09/17/2021     Lab Results   Component Value Date    HDL 35 (L) 09/23/2021     Lab Results   Component Value Date    LDLCALC 142 (H) 09/23/2021     Lab Results   Component Value Date    LABVLDL 45 09/23/2021     No results found for: CHOLHDLRATIO      Interval Testing/Data:     ECHO 9/14/2021:   Summary  Right ventricular systolic function is mildly to moderately reduced . The right atrium is mildly dilated. Mild posterior mitral annular calcification is present. Aortic valve sclerosis without aortic stenosis. Normal systolic pulmonary artery pressure (SPAP) estimated at 33 mmHg (RA pressure 15 mmHg). Left ventricular systolic function is moderately reduced with ejection fraction estimated at 35-40 %. There is moderate global hypokinesis present. Left ventricle size is normal.  There is mild concentric left ventricular hypertrophy. Elevated left ventricular diastolic filling pressure: Septal E/e'' = 12.2 . LEXISCAN: 9/14/21  Summary  Normal LV function. There is normal isotope uptake at stress and rest. There is no evidence of  myocardial ischemia or scar. Low risk study     Additional Studies:    CT chest 2/11/22:  FINDINGS: Mediastinum: Thyroid gland appears normal.  Atherosclerotic change seen in aorta. Small calcified and noncalcified mediastinal nodes are noted. Moderate to severe coronary artery calcification is seen. Aortic valve calcification is seen. Trace pericardial fluid is seen. Small hiatal hernia seen. There is nonspecific thickening at the GE junction.         CT 9/2021  FINDINGS:   Pulmonary Arteries: Pulmonary arteries are adequately opacified for evaluation.  No evidence of intraluminal filling defect to suggest pulmonary   embolism.  Main pulmonary artery is normal in caliber.       Mediastinum: The central airways are clear.  Right hilar lymphadenopathy   measuring 2.3 x 1.1 cm (2, 135).  The heart and pericardium demonstrate no   acute abnormality.  Atherosclerotic disease of the thoracic aorta.       Lungs/pleura: Irregular right upper lobe nodule measures 2.4 x 1.5 cm (2,   51).  Right lower lobe consolidation and scattered bilateral ground-glass   opacities, consistent with COVID-19 pneumonia.  Panlobular emphysema.  No   pleural effusion or pneumothorax.       Upper Abdomen: Right adrenal gland nodule measuring 1.6 cm.  Left adrenal   gland hyperplasia.  Rest of visualized upper abdomen is unremarkable.       Soft Tissues/Bones: Degenerate disease of the thoracic spine.  No focal   osseous lesion.  Visualized soft tissues are unremarkable. Impression and Plan      1. Atrial fibrillation, paroxysmal - maintaining sinus rhythm by exam   2. Cardiomyopathy with BiV dysfunction, moderate LV dysfunction with EF 35 to 40%  3. Chronic congestive heart failure with reduced EF  4. Chronic hypoxic respiratory failure, 2L   5. COPD  6. Sepsis due to streptococcal pneumonia requiring mechanical ventilation, recovered, 9/2021  7. Lung lesion, 2.4 x 1.5, CT 3 months recommended      Patient Active Problem List   Diagnosis    Acute respiratory failure with hypoxia (HCC)    Multifocal pneumonia    Suspected COVID-19 virus infection    Septicemia ([address]    Acute on chronic respiratory failure with hypoxia ([address]    Pneumonia due to COVID-19 virus    Atrial fibrillation with RVR (HCC)    Cardiomyopathy ([address]    Biventricular heart failure ([address]    Essential hypertension       PLAN:  1. BMP and Pro-BNP  2. Continue your current medications. Metoprolol and Entresto for GDMT.   Need to check potassium levels and creatinine prior to further titration. 3.  Does not appear grossly volume overloaded by exam.  We will follow-up proBNP. He continues on Lasix 20 mg oral daily  4. Wrongly recommended complete smoking cessation    Follow up with me in 4 months    Lavelle's attestation: This note was scribed in the presence of Dr. Tiffany El M.D. By Yaya Arambula RN    The scribes documentation has been prepared under my direction and personally reviewed by me in its entirety. I confirm that the note above accurately reflects all work, treatment, procedures, and medical decision making performed by me. Jeanna Gonzáles MD, personally performed the services described in this documentation as scribed by Yaya Arambula RN in my presence, and it is both accurate and complete to the best of our ability. I will address the patient's cardiac risk factors and adjusted pharmacologic treatment as needed. In addition, I have reinforced the need for patient directed risk factor modification. All questions and concerns were addressed to the patient/family. Alternatives to my treatment were discussed. Thank you for allowing us to participate in the care of Nikhil Reddy. Please call me with any questions [phone].     Alesha Moy MD, [facility]  Cardiovascular Disease  [facility]  [phone] [facility]  [phone] [address] [place] [address]  2/25/2022 3:48 PM

## 2022-03-01 ENCOUNTER — HOSPITAL ENCOUNTER (OUTPATIENT)
Age: 67
Discharge: HOME | End: 2022-03-01
Payer: MEDICARE

## 2022-03-01 ENCOUNTER — TELEPHONE (OUTPATIENT)
Dept: CARDIOLOGY CLINIC | Age: 67
End: 2022-03-01

## 2022-03-01 DIAGNOSIS — I42.9 CARDIOMYOPATHY, UNSPECIFIED TYPE (HCC): ICD-10-CM

## 2022-03-01 DIAGNOSIS — I10 ESSENTIAL HYPERTENSION: ICD-10-CM

## 2022-03-01 LAB
ANION GAP SERPL CALCULATED.3IONS-SCNC: 10 MMOL/L (ref 3–16)
BUN BLDV-MCNC: 11 MG/DL (ref 7–20)
CALCIUM SERPL-MCNC: 9.3 MG/DL (ref 8.3–10.6)
CHLORIDE BLD-SCNC: 98 MMOL/L (ref 99–110)
CO2: 26 MMOL/L (ref 21–32)
CREAT SERPL-MCNC: 1 MG/DL (ref 0.8–1.3)
GFR AFRICAN AMERICAN: >60
GFR NON-AFRICAN AMERICAN: >60
GLUCOSE BLD-MCNC: 115 MG/DL (ref 70–99)
POTASSIUM SERPL-SCNC: 4.3 MMOL/L (ref 3.5–5.1)
PRO-BNP: 204 PG/ML (ref 0–124)
SODIUM BLD-SCNC: 134 MMOL/L (ref 136–145)

## 2022-03-01 PROCEDURE — 80048 BASIC METABOLIC PNL TOTAL CA: CPT

## 2022-03-01 PROCEDURE — 36415 COLL VENOUS BLD VENIPUNCTURE: CPT

## 2022-03-01 PROCEDURE — 83880 ASSAY OF NATRIURETIC PEPTIDE: CPT

## 2022-03-01 NOTE — TELEPHONE ENCOUNTER
----- Message from Gary Garcia MD sent at 3/1/2022  4:12 PM EST -----  Please let the patient know I reviewed his lab work. Electrolytes and kidney function look good, labs indicate no significant water buildup in the body. No change in medications at this time.   Follow-up as instructed at the prior appointment

## 2022-05-04 DIAGNOSIS — I42.9 CARDIOMYOPATHY, UNSPECIFIED TYPE (HCC): ICD-10-CM

## 2022-05-04 DIAGNOSIS — I10 ESSENTIAL HYPERTENSION: ICD-10-CM

## 2022-05-04 RX ORDER — SACUBITRIL AND VALSARTAN 24; 26 MG/1; MG/1
TABLET, FILM COATED ORAL
Qty: 60 TABLET | Refills: 2 | Status: SHIPPED | OUTPATIENT
Start: 2022-05-04

## 2022-06-07 ENCOUNTER — HOSPITAL ENCOUNTER (OUTPATIENT)
Dept: CT IMAGING | Age: 67
Discharge: HOME | End: 2022-06-07
Payer: MEDICARE

## 2022-06-07 ENCOUNTER — OFFICE VISIT (OUTPATIENT)
Dept: PULMONOLOGY | Age: 67
End: 2022-06-07
Payer: MEDICARE

## 2022-06-07 ENCOUNTER — HOSPITAL ENCOUNTER (OUTPATIENT)
Dept: PULMONOLOGY | Age: 67
Discharge: HOME | End: 2022-06-07
Payer: MEDICARE

## 2022-06-07 VITALS
BODY MASS INDEX: 35.4 KG/M2 | OXYGEN SATURATION: 97 % | DIASTOLIC BLOOD PRESSURE: 86 MMHG | HEIGHT: 69 IN | SYSTOLIC BLOOD PRESSURE: 130 MMHG | WEIGHT: 239 LBS | HEART RATE: 64 BPM

## 2022-06-07 DIAGNOSIS — J43.9 PULMONARY EMPHYSEMA, UNSPECIFIED EMPHYSEMA TYPE (HCC): ICD-10-CM

## 2022-06-07 DIAGNOSIS — R91.1 PULMONARY NODULE: Primary | ICD-10-CM

## 2022-06-07 DIAGNOSIS — R91.1 PULMONARY NODULE: ICD-10-CM

## 2022-06-07 DIAGNOSIS — J44.1 COPD EXACERBATION (HCC): ICD-10-CM

## 2022-06-07 LAB
DLCO %PRED: 27 %
DLCO PRED: NORMAL
DLCO/VA %PRED: NORMAL
DLCO/VA PRED: NORMAL
DLCO/VA: NORMAL
DLCO: NORMAL
EXPIRATORY TIME-POST: NORMAL
EXPIRATORY TIME: NORMAL
FEF 25-75% %CHNG: NORMAL
FEF 25-75% %PRED-POST: NORMAL
FEF 25-75% %PRED-PRE: NORMAL
FEF 25-75% PRED: NORMAL
FEF 25-75%-POST: NORMAL
FEF 25-75%-PRE: NORMAL
FEV1 %PRED-POST: 22 %
FEV1 %PRED-PRE: 23 %
FEV1 PRED: NORMAL
FEV1-POST: NORMAL
FEV1-PRE: NORMAL
FEV1/FVC %PRED-POST: NORMAL
FEV1/FVC %PRED-PRE: NORMAL
FEV1/FVC PRED: NORMAL
FEV1/FVC-POST: 26 %
FEV1/FVC-PRE: 29 %
FVC %PRED-POST: NORMAL
FVC %PRED-PRE: NORMAL
FVC PRED: NORMAL
FVC-POST: NORMAL
FVC-PRE: NORMAL
GAW %PRED: NORMAL
GAW PRED: NORMAL
GAW: NORMAL
IC %PRED: NORMAL
IC PRED: NORMAL
IC: NORMAL
MEP: NORMAL
MIP: NORMAL
MVV %PRED-PRE: NORMAL
MVV PRED: NORMAL
MVV-PRE: NORMAL
PEF %PRED-POST: NORMAL
PEF %PRED-PRE: NORMAL
PEF PRED: NORMAL
PEF%CHNG: NORMAL
PEF-POST: NORMAL
PEF-PRE: NORMAL
RAW %PRED: NORMAL
RAW PRED: NORMAL
RAW: NORMAL
RV %PRED: NORMAL
RV PRED: NORMAL
RV: NORMAL
SVC %PRED: NORMAL
SVC PRED: NORMAL
SVC: NORMAL
TLC %PRED: 56 %
TLC PRED: NORMAL
TLC: NORMAL
VA %PRED: NORMAL
VA PRED: NORMAL
VA: NORMAL
VTG %PRED: NORMAL
VTG PRED: NORMAL
VTG: NORMAL

## 2022-06-07 PROCEDURE — G8417 CALC BMI ABV UP PARAM F/U: HCPCS | Performed by: INTERNAL MEDICINE

## 2022-06-07 PROCEDURE — 1123F ACP DISCUSS/DSCN MKR DOCD: CPT | Performed by: INTERNAL MEDICINE

## 2022-06-07 PROCEDURE — 6370000000 HC RX 637 (ALT 250 FOR IP): Performed by: INTERNAL MEDICINE

## 2022-06-07 PROCEDURE — 4004F PT TOBACCO SCREEN RCVD TLK: CPT | Performed by: INTERNAL MEDICINE

## 2022-06-07 PROCEDURE — G8427 DOCREV CUR MEDS BY ELIG CLIN: HCPCS | Performed by: INTERNAL MEDICINE

## 2022-06-07 PROCEDURE — 3017F COLORECTAL CA SCREEN DOC REV: CPT | Performed by: INTERNAL MEDICINE

## 2022-06-07 PROCEDURE — 94729 DIFFUSING CAPACITY: CPT

## 2022-06-07 PROCEDURE — 99214 OFFICE O/P EST MOD 30 MIN: CPT | Performed by: INTERNAL MEDICINE

## 2022-06-07 PROCEDURE — 94618 PULMONARY STRESS TESTING: CPT

## 2022-06-07 PROCEDURE — 71250 CT THORAX DX C-: CPT

## 2022-06-07 PROCEDURE — 94060 EVALUATION OF WHEEZING: CPT

## 2022-06-07 PROCEDURE — 94640 AIRWAY INHALATION TREATMENT: CPT

## 2022-06-07 PROCEDURE — 3023F SPIROM DOC REV: CPT | Performed by: INTERNAL MEDICINE

## 2022-06-07 PROCEDURE — 94726 PLETHYSMOGRAPHY LUNG VOLUMES: CPT

## 2022-06-07 RX ORDER — PREDNISONE 10 MG/1
TABLET ORAL
Qty: 25 TABLET | Refills: 0 | Status: SHIPPED | OUTPATIENT
Start: 2022-06-07 | End: 2022-06-17

## 2022-06-07 RX ORDER — ALBUTEROL SULFATE 90 UG/1
4 AEROSOL, METERED RESPIRATORY (INHALATION) ONCE
Status: COMPLETED | OUTPATIENT
Start: 2022-06-07 | End: 2022-06-07

## 2022-06-07 RX ADMIN — Medication 4 PUFF: at 12:00

## 2022-06-07 ASSESSMENT — PULMONARY FUNCTION TESTS
FEV1_PERCENT_PREDICTED_PRE: 23
FEV1/FVC_PRE: 29
FEV1/FVC_POST: 26
FEV1_PERCENT_PREDICTED_POST: 22

## 2022-06-07 NOTE — PROCEDURES
[address]                 [address] [address]                               PULMONARY FUNCTION    PATIENT NAME: Jenn Tsang                 :        1955  MED REC NO:   [id number]                          ROOM:  ACCOUNT NO:   [de-identified]                           ADMIT DATE: 2022  PROVIDER:     Franco Hermosillo MD    DATE OF PROCEDURE:  2022    INDICATION:  Pulmonary emphysema. FINDINGS:  1. Spirometry: The FEV1 is 0.77 liters, which is 23% of predicted. The FEV1/FVC ratio is reduced. Inhaled bronchodilators are given. There is no significant improvement. 2.  Lung volumes: Total lung capacity is reduced at 3.89 liters or 56%  of predicted. 3.  Diffusion capacity:  DLCO is reduced at 8.06 mL/min/mmHg, which is  27% of predicted. 4.  Flow volume loop shows an obstructive lung defect. 5.  Six-minute walk test per [facility] protocol. Baseline oxygen  saturation 95%. Lowest oxygen saturation 90%. The patient ambulated  200 feet. IMPRESSION:  There is a mixed obstructive and restrictive lung defect  along with severe reduction in diffusion capacity. There is also  significant oxyhemoglobin desaturation on six-minute walk testing, but  supplemental oxygen was not required.         Gayatri Carver MD    D: 2022 12:53:03       T: 2022 15:00:40     [email or web address]  Job#: [id number]     Doc#: [id number]    CC:

## 2022-06-07 NOTE — PROGRESS NOTES
Pulmonary & Critical Care Medicine ICU Progress Note  CC: Pulmonary Nodule   Referring provider: Hospital f/u    Interval History 6/7/22: had PFT, several weeks more shortness of breath, less exercise tolerance, concentrator had dirty filter that was replaced but still not to baseline, more sputum as well    Interval History: 2/16/2022  - Qualified for 2 L O2 after 2 mins ambulation LOV  - Dr. William Espinal d/c'd Amiodarone & Valsartan. Pt did not show for ECHO. - Here for 3 mo f/u CT Chest completed 2/11/22. Presenting HPI: 62 yo male with PMHx of Afib & recent multifocal CAP causing COPD exacerbation & subsequent intubation (admitted 9/14 - 9/23/21) who presents today for f/u of abnormal CT imaging during admission--irregular 2.4 cm RUL Pulmonary Nodule with right hilar lymphadenopathy, concerning for malignancy. Was d/c'd on 2 L O2, prednisone, & with nicotine patches. Required home health care d/t deconditioning in hospital.      reports that he has been smoking. He has never used smokeless tobacco.    PHYSICAL EXAM:  Blood pressure 130/86, pulse 64, height 5' 9\" (1.753 m), weight 239 lb (108.4 kg), SpO2 97 %.' RA   Constitutional:  No acute distress. HENT:  Oropharynx is clear and moist.   Neck: No tracheal deviation present. Cardiovascular: Normal heart sounds. No lower extremity edema. Pulmonary/Chest: No wheezes. No rhonchi. No rales. No decreased breath sounds. No accessory muscle usage or stridor. Musculoskeletal: No cyanosis. No clubbing. Skin: Skin is warm and dry. Psychiatric: Normal mood and affect. Neurologic: speech fluent, alert and oriented, strength symmetric        DATA:  9/14/2021 SARS-CoV-2 NAAT and PCR negative  9/14/2021 blood NGTD  9/15/2021 urine antigens are negative  9/15/2021 tracheal aspirate strep pneumonia    Stress & TTE 9/17/2021  No evidence of myocardial ischemia, low risk study.   EF 35-40%  Elevated LV diastolic filling pressure  Normal SPAP estimated at 33 mmHg    CT PET 10/26/2021  HEAD/NECK: Severe atherosclerotic change seen in the carotid arteries.  No hypermetabolic nodule in the thyroid gland. CHEST: There is underlying emphysema seen. When compared to prior chest CT there is seen improved aeration of the left lung base and right lung base with no significant consolidation remaining. There remains a dominant irregular nodule in the right upper lobe, which measures 2.4 cm by 1.6 cm, similar to prior, when measured at a similar level.  Maximum SUV measures 1.71   No hypermetabolic mediastinal adenopathy.  Calcified mediastinal nodes are noted moderate coronary artery calcification is seen.  Aortic valve calcification is seen. ABDOMEN/PELVIS: Thickening and hypodensity seen in the adrenal glands bilaterally, likely due to adenoma or hyperplasia.  No hypermetabolic adrenal mass. Atherosclerotic change seen in abdominal aorta.  No hypermetabolic retroperitoneal adenopathy. There is subtle lobular contour to the liver.  Atherosclerotic change seen in aorta.  No hypermetabolic retroperitoneal adenopathy. There is a focal area of hypermetabolic uptake seen in the prostate posteriorly on the right.  Maximum SUV measures 3.63. Spurring is seen in the spine.  Spurring is seen in the hips   Focal area of increased uptake seen in the soft tissues, posterior to C7-T1, likely due to prior trauma-low-grade strain. .  Similar area of muscular uptake marginates the greater trochanter on the right. Impression   Right upper lobe irregular nodule persists.  While it is not significantly hypermetabolic with maximum SUV measuring 1.71, its underlying appearance is suspicious for an early finding of lung carcinoma. Focal area of hypermetabolic uptake in the prostate posteriorly on the right. Recommend correlation with PSA level.      CT Chest 2/11/2022  Mediastinum: Thyroid gland appears normal.  Atherosclerotic change seen in   aorta.  Small calcified and noncalcified mediastinal nodes are noted. Moderate to severe coronary artery calcification is seen.  Aortic valve   calcification is seen.  Trace pericardial fluid is seen.  Small hiatal hernia   seen. [place] An is nonspecific thickening at the GE junction.       Lungs/pleura: Moderate to severe underlying emphysema is seen.  Scattered   areas of bronchial wall thickening are seen. [place] An is improved aeration of   the left lower lobe compared to prior conventional chest CT.  A bandlike   opacity in the lingula appears similar.       There is improved aeration of the right lower lobe compared to prior   conventional chest CT.  No focal consolidation seen on the right.       There remains a dominant irregular nodule in the right upper lobe.  This   measures 2.4 x 1.5 cm, similar to prior, when measured in a similar fashion. The inferior portion appears spiculated.  The superior portion appears   bandlike. Impression   Persistent irregular nodule in the right upper lobe, with lack of resolution   and irregular appearance raising the question of early lung carcinoma.       Areas of consolidative change seen on prior chest CT 09/14/2021 have since   resolved.       RECOMMENDATIONS: Unavailable     CT CHEST 6/7/22  Mediastinum: Thyroid gland is unremarkable.  Atherosclerotic change is seen   in the thoracic aorta.  Small calcified and noncalcified mediastinal nodes   are noted.  Aortic valve calcification is seen.  Aortic annulus calcification   is seen.  Coronary artery calcification is seen.  Small hiatal hernia is   seen. [place] An is nonspecific thickening at the GE junction       Lungs/pleura: Moderate to severe underlying emphysema is seen.  Scattered   areas of bronchial wall thickening are seen.  Bandlike opacity seen in the   lingula. .  No suspicious nodules on the left       On the right, an dominant irregular nodule in the right upper lobe is   redemonstrated.  This measures 2.4 x 1.6 cm, similar to prior PET-CT,.       Also on the right, there is increasing ground-glass opacity and tree-in-bud   nodularity in the right lower lobe airways with increased filling defects in   the right lower lobe airways peripherally.       Upper Abdomen: There is mild thickening and hypodensity seen in the adrenal   glands, likely due to adenoma.  Atherosclerotic change seen in abdominal   aorta.  There is subtle lobular contour to the liver.  Calcified granuloma   seen within the spleen       Soft Tissues/Bones: Spurring is seen in the spine.  Spurring is seen in the   shoulder joints.  Remote appearing rib fractures are seen           Impression   Persistent solid nodule in the right upper lobe, similar compared to 2021. PFT 6/7/22 FEV1 0.77 L 23% TLC 3.89 L 56% DLCO 8.06 27%  6MWT 90%  ft    ASSESSMENT:  · Pulmonary Nodule: RUL 2.4 X 1.6 cm nodule, mild uptake on PET imaging, stable Sept 2021 to May 2022  · Right adrenal nodule 1.6 cm, PET imaging reassuring  · Severe COPD, emphysema with mild acute exacerbation   · Atrial fibrillation    · Cardiomyopathy, biventricular dysfunction, EF 35-40% - following with Dr. Avinash Anguiano  · Tobacco abuse, 45 pack year history, (smoking 60+ years), cutting down with patches   · H/o alcohol abuse   ·     PLAN:  Prednisone taper  Trelegy daily, albuterol PRN   Tobacco cessation recommended   CT CHEST no IV dye in 6 months, see me after  · On Xarelto per cardiology   · MAXINE brother (sister-in-law) [address] [phone]   · Offered Chantix, he is considering, precontemplative    I discussed with this patient today the risks and benefits of various tobacco cessation strategies, including, but not limited to \"cold turkey,\" nicotine replacement, Chantix. We specifically discussed the risks of Chantix,  with focus on side effects to include nausea, intense dreams, and spent considerable time focusing regarding depression.   The patient specifically denies suicidal ideation/homicidal ideation and was counseled to stop the product and immediately seek medical assistance for any worsening depression/mood disturbance, agitation, hostility or changes in behavior or thinking. Specific risk of completed suicide was discussed. The patient is encouraged to read enclosed literature for any prescribed medications.

## 2022-06-20 NOTE — PROGRESS NOTES
CARDIOLOGY FOLLOW-UP VISIT        Patient Name: Delia Alcantara  Primary Care physician: Lashae Page DO  Reason for Referral/Chief complaint: Delia Alcantara  1955 is here to cardiac clinic for follow up of atrial fibrillation and cardiomyopathy. Subjective:     Delia Alcantara is a 74 y.o. with a PMH notable for AFIB, CHF, cardiomyopathy, and COPD.    9/22/21 at which time he was admitted with PNA and was newly diagnosed with atrial fibrillation and cardiomyopathy. CT chest showed scattered GGO's, emphysema and RUL nodule. Unfortunately patient's condition deteriorated requiring mechanical intubation. Ultimately diagnosed with streptococcal pneumonia and treated accordingly. 9/15/21 EKG showed atrial fibrillation  bpm for which cardiology is consulted. Echocardiogram showed moderate LV dysfunction with EF 35-40 %. Stress test was negative for inducible ischemia. This was controlled with amiodarone and beta-blocker. Patient was eventually discharged 9/23/2021. LOV,2/25/22 presented with his sister [place]. Reported increasing shortness of breath. No sign of congestive volume overload. GDMT continued. He was asked to arrange repeat echocardiogram and to see pulmonary medicine. In the interm had lab work and followed up with [facility] pulmonology on 06/07/2022. Today, presents in wheelchair here with [place] sister. Still on oxygen @2lpm. Has it with him, but not connected to it during office visit. Says was on prednisone and this helped his shortness of breath significantly. States since weaning off however, feeling worse again. Also notes he had mold found in his oxygen supply MA to mail. Follows with Dr. Yann Gibson. Compliant with his medications. No increasing lower extremity edema. His abdomen was bloated from constipation more recently but this resolved. He is losing weight not gaining. No orthopnea or PND endorsed. Denies palpitations or heart racing. Denies chest pains    He denies any evidence of hematemesis, hemoptysis, melena, hematochezia or hematuria with blood thinner. The patient is compliant with medications. Cost of medications is affordable. No endorsed side effects. Home Medications:  Were reviewed and are listed in nursing record and/or below  Prior to Admission medications    Medication Sig Start Date End Date Taking? Authorizing Provider   ENTRESTO 24-26 MG per tablet Take 1 tablet by mouth twice daily. 5/4/22  Yes Yanet Dias MD   fluticasone-umeclidin-vilant (TRELEGY ELLIPTA) 511-33.0-70 MCG/INH AEPB Inhale 1 puff into the lungs daily 2/16/22 11/11/24 Yes Beatris Rouse MD   albuterol sulfate  (90 Base) MCG/ACT inhaler Inhale 2 puffs into the lungs every 4 hours as needed for Wheezing or Shortness of Breath 2/16/22  Yes Beatris Rouse MD   furosemide (LASIX) 20 MG tablet Take 1 tablet by mouth daily 12/17/21  Yes Yanet Dias MD   rivaroxaban (XARELTO) 20 MG TABS tablet Take 1 tablet by mouth daily (with breakfast) 12/17/21  Yes Yanet Dias MD   ipratropium-albuterol (DUONEB) 0.5-2.5 (3) MG/3ML SOLN nebulizer solution Inhale 3 mLs into the lungs every 4 hours as needed for Shortness of Breath  Patient not taking: Reported on 6/27/2022 2/16/22   Beatris Rouse MD   metoprolol succinate (TOPROL XL) 200 MG extended release tablet Take 1 tablet by mouth daily 12/17/21 3/17/22  Yanet Dias MD   nicotine (NICODERM CQ) 14 MG/24HR Place 1 patch onto the skin daily 11/2/21 12/14/21  Beatris Rouse MD   nicotine (NICODERM CQ) 14 MG/24HR  9/24/21   Historical Provider, MD        CURRENT Medications:  No current facility-administered medications for this visit. Allergies:  Patient has no known allergies. Review of Systems:   A 14 point review of symptoms completed. Pertinent positives identified in the HPI, all other review of symptoms negative.        Objective:     Vitals:    06/27/22 1535   BP: 120/62   Pulse: 72   SpO2: 98% Weight: 236 lb (107 kg)   Height: 5' 9\" (1.753 m)     Wt Readings from Last 3 Encounters:   06/27/22 236 lb (107 kg)   06/07/22 239 lb (108.4 kg)   02/25/22 243 lb (110.2 kg)          PHYSICAL EXAM:    General:   Older appearing than stated age, resting comfortably in wheelchair   Head:  Normocephalic, atraumatic   Eyes:  Conjunctiva/corneas clear, anicteric sclerae    Nose: Nares normal, no drainage or sinus tenderness   Throat: No abnormalities of the lips, oral mucosa or tongue. Neck: Trachea midline. Neck supple with no lymphadenopathy, thyroid not enlarged, symmetric, no tenderness/mass/nodules   Lungs:    Diminished breath sounds throughout, no luis wheezes or rales. No luis air hunger on room air presently   Chest Wall:  No deformity or tenderness to palpation   Heart:  Regular rate and rhythm, distant heart sounds, no murmur, no rub, no S3/S4, PMI non-palpable   Abdomen:   Obese abdomen, soft, non-tender, with normoactive bowel sounds. No masses, no hepatosplenomegaly   Extremities: No cyanosis, clubbing, no pitting edema   Vascular: 2+ radial, dec dorsalis pedis and posterior tibial pulses bilaterally. Brisk carotid upstrokes bilateral carotid bruits R louder than L. Skin: Skin color, texture, turgor are normal with no rashes or ulceration. Pysch: Euthymic mood, appropriate affect   Neurologic: Oriented to person, place and time. No slurred speech or facial asymmetry. No motor or sensory deficits on gross examination.          Labs:   CBC:   Lab Results   Component Value Date    WBC 6.0 10/19/2021    RBC 4.38 10/19/2021    HGB 14.0 10/19/2021    HCT 41.1 10/19/2021    MCV 93.9 10/19/2021    RDW 13.6 10/19/2021     10/19/2021     CMP:  Lab Results   Component Value Date     03/01/2022    K 4.3 03/01/2022    K 4.2 09/23/2021    CL 98 03/01/2022    CO2 26 03/01/2022    BUN 11 03/01/2022    CREATININE 1.0 03/01/2022    GFRAA >60 03/01/2022    AGRATIO 1.3 09/23/2021    LABGLOM >60 03/01/2022    GLUCOSE 115 03/01/2022    PROT 6.4 09/23/2021    CALCIUM 9.3 03/01/2022    BILITOT 1.1 09/23/2021    ALKPHOS 70 09/23/2021    AST 60 09/23/2021     09/23/2021     PT/INR:  No results found for: PTINR  HgBA1c:  Lab Results   Component Value Date    LABA1C 5.4 09/15/2021     Lab Results   Component Value Date    TROPONINI <0.01 09/21/2021     Lab Results   Component Value Date    CHOL 222 (H) 09/23/2021     Lab Results   Component Value Date    TRIG 223 (H) 09/23/2021    TRIG 214 (H) 09/17/2021     Lab Results   Component Value Date    HDL 35 (L) 09/23/2021     Lab Results   Component Value Date    LDLCALC 142 (H) 09/23/2021     Lab Results   Component Value Date    LABVLDL 45 09/23/2021     No results found for: [facility]      Interval Testing/Data:   CT Chest: 06/07/2022  FINDINGS: Mediastinum: Thyroid gland is unremarkable. Atherosclerotic change is seen in the thoracic aorta. Small calcified and noncalcified mediastinal nodes are noted. Aortic valve calcification is seen. Aortic annulus calcification is seen. Coronary artery calcification is seen. Small hiatal hernia is seen. There is nonspecific thickening at the GE junction     ECHO 9/14/2021:   Summary  Right ventricular systolic function is mildly to moderately reduced . The right atrium is mildly dilated. Mild posterior mitral annular calcification is present. Aortic valve sclerosis without aortic stenosis. Normal systolic pulmonary artery pressure (SPAP) estimated at 33 mmHg (RA pressure 15 mmHg). Left ventricular systolic function is moderately reduced with ejection fraction estimated at 35-40 %. There is moderate global hypokinesis present. Left ventricle size is normal.  There is mild concentric left ventricular hypertrophy. Elevated left ventricular diastolic filling pressure: Septal E/e'' = 12.2 . LEXISCAN: 9/14/21  Summary  Normal LV function.   There is normal isotope uptake at stress and rest. There is no evidence of  myocardial ischemia or scar. Low risk study       Impression and Plan      1. Atrial fibrillation, paroxysmal   2. Cardiomyopathy with BiV dysfunction, moderate LV dysfunction with EF 35 to 40%  3. Chronic congestive heart failure with reduced EF, compensated  4. Coronary artery disease by CT   5. COPD, severe, with chronic hypoxic respiratory failure, 2L nasal cannula  6. Sepsis due to streptococcal pneumonia requiring mechanical ventilation, recovered, 2021  7. Lung lesion, 2.4 x 1.5, CT 3 months recommended    The 10-year ASCVD risk score (Bulmaro Savage., et al., 2013) is: 23.6%    Values used to calculate the score:      Age: 74 years      Sex: Male      Is Non- : No      Diabetic: No      Tobacco smoker: Yes      Systolic Blood Pressure: 132 mmHg      Is BP treated: Yes      HDL Cholesterol: 35 mg/dL      Total Cholesterol: 222 mg/dL      Patient Active Problem List   Diagnosis    Acute respiratory failure with hypoxia (HCC)    Multifocal pneumonia    Suspected COVID-19 virus infection    Septicemia ([address]    Acute on chronic respiratory failure with hypoxia ([address]    Pneumonia due to COVID-19 virus    Atrial fibrillation with RVR (HCC)    Cardiomyopathy ([address]    Biventricular heart failure ([address]    Essential hypertension       PLAN:  1. Start Lipitor 40 mg once nightly for coronary artery disease  2. Continue Xarelto for stroke prophylaxis  3. Continues on GDMT. No changes made today. 4.  Plan to perform limited echo if he will schedule. Sister Bari Evans states she will get this arranged. 5.  I Strongly advised complete smoking cessation. Resources given to assist quitting including the followin-800-QUIT NOW.       Follow up with me in  6 months; call with echo results in the interim    This note is scribed in the presence of Samantha Hunter by Shelly Webber RN    The scribes documentation has been prepared under my direction and personally reviewed by me in its entirety. I confirm that the note above accurately reflects all work, treatment, procedures, and medical decision making performed by me. Ilya Stovall MD, personally performed the services described in this documentation as scribed by Jennifer Mills RN in my presence, and it is both accurate and complete to the best of our ability. I will address the patient's cardiac risk factors and adjusted pharmacologic treatment as needed. In addition, I have reinforced the need for patient directed risk factor modification. All questions and concerns were addressed to the patient/family. Alternatives to my treatment were discussed. Thank you for allowing us to participate in the care of Nina Blackwell. Please call me with any questions [phone].     Fifi Almanza MD, [address] [facility]  Cardiovascular Disease  [facility]  [phone] [facility]  [phone] [address] [place] [address]  6/27/2022 4:10 PM

## 2022-06-27 ENCOUNTER — OFFICE VISIT (OUTPATIENT)
Dept: CARDIOLOGY CLINIC | Age: 67
End: 2022-06-27
Payer: MEDICARE

## 2022-06-27 VITALS
WEIGHT: 236 LBS | BODY MASS INDEX: 34.96 KG/M2 | HEIGHT: 69 IN | OXYGEN SATURATION: 98 % | DIASTOLIC BLOOD PRESSURE: 62 MMHG | SYSTOLIC BLOOD PRESSURE: 120 MMHG | HEART RATE: 72 BPM

## 2022-06-27 DIAGNOSIS — I42.9 CARDIOMYOPATHY, UNSPECIFIED TYPE (HCC): ICD-10-CM

## 2022-06-27 DIAGNOSIS — I48.91 ATRIAL FIBRILLATION WITH RVR (HCC): Primary | ICD-10-CM

## 2022-06-27 DIAGNOSIS — I10 ESSENTIAL HYPERTENSION: ICD-10-CM

## 2022-06-27 DIAGNOSIS — I50.82 BIVENTRICULAR HEART FAILURE (HCC): ICD-10-CM

## 2022-06-27 PROCEDURE — 99214 OFFICE O/P EST MOD 30 MIN: CPT | Performed by: INTERNAL MEDICINE

## 2022-06-27 PROCEDURE — 4004F PT TOBACCO SCREEN RCVD TLK: CPT | Performed by: INTERNAL MEDICINE

## 2022-06-27 PROCEDURE — G8417 CALC BMI ABV UP PARAM F/U: HCPCS | Performed by: INTERNAL MEDICINE

## 2022-06-27 PROCEDURE — 1123F ACP DISCUSS/DSCN MKR DOCD: CPT | Performed by: INTERNAL MEDICINE

## 2022-06-27 PROCEDURE — 3017F COLORECTAL CA SCREEN DOC REV: CPT | Performed by: INTERNAL MEDICINE

## 2022-06-27 PROCEDURE — G8427 DOCREV CUR MEDS BY ELIG CLIN: HCPCS | Performed by: INTERNAL MEDICINE

## 2022-06-27 RX ORDER — ATORVASTATIN CALCIUM 40 MG/1
40 TABLET, FILM COATED ORAL NIGHTLY
Qty: 90 TABLET | Refills: 3 | Status: SHIPPED | OUTPATIENT
Start: 2022-06-27

## 2022-06-27 NOTE — PATIENT INSTRUCTIONS
PLAN:  1. Follow up with Dr. Anthony Asif   2. Recommend elevating legs when you can. 3. Stay hydrated with water. Follow up with Channing Delgado DO  With constipation issues. 4. Continue current medications. 5. I recommend ECHO to evaluate LV function and size, wall motion, and valves for any structural abnormalities. Call to schedule   Your provider has ordered testing for further evaluation. An order/prescription has been included in your paper work. To schedule outpatient testing, contact [facility] Sched[facility] by calling [address] [place] [address] ([phone]). 5. Start Lipitor 40 mg one tab at night. 6. I Strongly advised complete smoking cessation. Resources given to assist quitting including the followin-800-QUIT NOW.     Follow up with me in  6 months

## 2022-07-08 ENCOUNTER — TELEPHONE (OUTPATIENT)
Dept: PULMONOLOGY | Age: 67
End: 2022-07-08

## 2022-07-08 DIAGNOSIS — J43.9 PULMONARY EMPHYSEMA, UNSPECIFIED EMPHYSEMA TYPE (HCC): ICD-10-CM

## 2022-07-08 DIAGNOSIS — J44.9 CHRONIC OBSTRUCTIVE PULMONARY DISEASE, UNSPECIFIED COPD TYPE (HCC): ICD-10-CM

## 2022-07-08 DIAGNOSIS — J44.1 COPD EXACERBATION (HCC): Primary | ICD-10-CM

## 2022-07-08 NOTE — TELEPHONE ENCOUNTER
Pt family called wanting us to reach out to [address] [place] [address], faxed office note, but will wait for Alanna to send CMN.

## 2022-07-08 NOTE — TELEPHONE ENCOUNTER
Received a call from [address] [place] [address] - they received the records. They just need us to fax a new order for O2 & then they can send the CMN over. Pt is on 2 LPM continuous via nasal cannula. Order pended, will route to Dr. Alice Otto for sig.

## 2022-07-14 ENCOUNTER — HOSPITAL ENCOUNTER (OUTPATIENT)
Dept: NON INVASIVE DIAGNOSTICS | Age: 67
Discharge: HOME | End: 2022-07-14
Payer: MEDICARE

## 2022-07-14 DIAGNOSIS — R06.02 SHORTNESS OF BREATH: ICD-10-CM

## 2022-07-14 DIAGNOSIS — R07.1 CHEST PAIN ON BREATHING: ICD-10-CM

## 2022-07-14 PROCEDURE — 93306 TTE W/DOPPLER COMPLETE: CPT

## 2022-07-14 PROCEDURE — 93308 TTE F-UP OR LMTD: CPT

## 2022-07-15 ENCOUNTER — TELEPHONE (OUTPATIENT)
Dept: CARDIOLOGY CLINIC | Age: 67
End: 2022-07-15

## 2022-07-15 NOTE — TELEPHONE ENCOUNTER
----- Message from Paola Berumen MD sent at 7/14/2022  5:41 PM EDT -----  please let Castillostefani Nilton know I reviewed his echocardiogram results. Heart strength has normalized to 55%. This is great news. There is incidental note of large fat pad surrounding the heart which is a normal finding in folks, this is NOT fluid surrounding the heart, this is unchanged when I compare to his study from September/2021 , there is no cause for concern with this-disregard read in hi5hart if he is active on this.

## 2022-10-17 RX ORDER — ALBUTEROL SULFATE 90 UG/1
2 AEROSOL, METERED RESPIRATORY (INHALATION) EVERY 4 HOURS PRN
Qty: 18 G | Refills: 6 | Status: SHIPPED | OUTPATIENT
Start: 2022-10-17

## 2022-10-18 DIAGNOSIS — J44.9 CHRONIC OBSTRUCTIVE PULMONARY DISEASE, UNSPECIFIED COPD TYPE (HCC): Primary | ICD-10-CM

## 2022-10-18 RX ORDER — IPRATROPIUM BROMIDE AND ALBUTEROL SULFATE 2.5; .5 MG/3ML; MG/3ML
3 SOLUTION RESPIRATORY (INHALATION) EVERY 4 HOURS PRN
Qty: 360 ML | Refills: 2 | Status: SHIPPED | OUTPATIENT
Start: 2022-10-18

## 2022-10-25 ENCOUNTER — TELEPHONE (OUTPATIENT)
Dept: PULMONOLOGY | Age: 67
End: 2022-10-25

## 2022-10-25 DIAGNOSIS — J44.9 CHRONIC OBSTRUCTIVE PULMONARY DISEASE, UNSPECIFIED COPD TYPE (HCC): ICD-10-CM

## 2022-10-25 DIAGNOSIS — J43.9 PULMONARY EMPHYSEMA, UNSPECIFIED EMPHYSEMA TYPE (HCC): ICD-10-CM

## 2022-10-25 DIAGNOSIS — J44.1 COPD EXACERBATION (HCC): Primary | ICD-10-CM

## 2022-10-25 RX ORDER — IPRATROPIUM BROMIDE AND ALBUTEROL SULFATE 2.5; .5 MG/3ML; MG/3ML
1 SOLUTION RESPIRATORY (INHALATION) EVERY 4 HOURS
Qty: 360 ML | Refills: 2 | Status: SHIPPED | OUTPATIENT
Start: 2022-10-25 | End: 2022-10-25 | Stop reason: SDUPTHER

## 2022-10-25 NOTE — TELEPHONE ENCOUNTER
Received a call from [facility] (formerly called [facility] in [place]. [place]) called saying they need Rx for Duoneb to be resent via e-prescribe or fax with associated ICD-10 code. Med pended and routed to Alexandre Vera for sig. Rx was sent there on 10/18/22 and I can't tell whether it had ICD-10 code associated with it or not.

## 2022-11-17 DIAGNOSIS — I10 ESSENTIAL HYPERTENSION: ICD-10-CM

## 2022-11-17 DIAGNOSIS — I42.9 CARDIOMYOPATHY, UNSPECIFIED TYPE (HCC): ICD-10-CM

## 2022-11-17 RX ORDER — SACUBITRIL AND VALSARTAN 24; 26 MG/1; MG/1
TABLET, FILM COATED ORAL
Qty: 60 TABLET | Refills: 4 | Status: SHIPPED | OUTPATIENT
Start: 2022-11-17

## 2022-11-17 NOTE — TELEPHONE ENCOUNTER
Medication Refill    Medication needing refilled:ENTRESTO 24-26 MG per tablet       Dosage of the medication: 24-26 mg     How are you taking this medication (QD, BID, TID, QID, PRN): Take 1 tablet by mouth twice daily.     30 or 90 day supply called in: 60    When will you run out of your medication: now     Which Pharmacy are we sending the medication to?:    [address] [phone] Shari Bautista [phone]   [facility] [address]   Phone:  [phone]  Fax:  [phone]

## 2022-11-21 DIAGNOSIS — J44.9 CHRONIC OBSTRUCTIVE PULMONARY DISEASE, UNSPECIFIED COPD TYPE (HCC): ICD-10-CM

## 2022-11-21 RX ORDER — IPRATROPIUM BROMIDE AND ALBUTEROL SULFATE 2.5; .5 MG/3ML; MG/3ML
3 SOLUTION RESPIRATORY (INHALATION) EVERY 4 HOURS PRN
Qty: 360 ML | Refills: 5 | Status: SHIPPED | OUTPATIENT
Start: 2022-11-21

## 2022-12-02 DIAGNOSIS — J44.9 CHRONIC OBSTRUCTIVE PULMONARY DISEASE, UNSPECIFIED COPD TYPE (HCC): ICD-10-CM

## 2022-12-02 RX ORDER — IPRATROPIUM BROMIDE AND ALBUTEROL SULFATE 2.5; .5 MG/3ML; MG/3ML
3 SOLUTION RESPIRATORY (INHALATION) EVERY 4 HOURS PRN
Qty: 360 ML | Refills: 5 | Status: SHIPPED | OUTPATIENT
Start: 2022-12-02

## 2022-12-02 NOTE — TELEPHONE ENCOUNTER
Received request for refill on iprat- albut (duo neb) from [facility]. Verified with Jd Collazo that they are unable to fill refill sent to [facility].

## 2022-12-12 ENCOUNTER — OFFICE VISIT (OUTPATIENT)
Dept: CARDIOLOGY CLINIC | Age: 67
End: 2022-12-12
Payer: MEDICARE

## 2022-12-12 VITALS
WEIGHT: 248 LBS | DIASTOLIC BLOOD PRESSURE: 70 MMHG | SYSTOLIC BLOOD PRESSURE: 138 MMHG | HEIGHT: 69 IN | OXYGEN SATURATION: 92 % | HEART RATE: 75 BPM | BODY MASS INDEX: 36.73 KG/M2

## 2022-12-12 DIAGNOSIS — I42.9 CARDIOMYOPATHY, UNSPECIFIED TYPE (HCC): Primary | ICD-10-CM

## 2022-12-12 PROCEDURE — 1123F ACP DISCUSS/DSCN MKR DOCD: CPT | Performed by: INTERNAL MEDICINE

## 2022-12-12 PROCEDURE — 3078F DIAST BP <80 MM HG: CPT | Performed by: INTERNAL MEDICINE

## 2022-12-12 PROCEDURE — 99214 OFFICE O/P EST MOD 30 MIN: CPT | Performed by: INTERNAL MEDICINE

## 2022-12-12 PROCEDURE — 3074F SYST BP LT 130 MM HG: CPT | Performed by: INTERNAL MEDICINE

## 2022-12-12 PROCEDURE — G8417 CALC BMI ABV UP PARAM F/U: HCPCS | Performed by: INTERNAL MEDICINE

## 2022-12-12 PROCEDURE — G8484 FLU IMMUNIZE NO ADMIN: HCPCS | Performed by: INTERNAL MEDICINE

## 2022-12-12 PROCEDURE — G8427 DOCREV CUR MEDS BY ELIG CLIN: HCPCS | Performed by: INTERNAL MEDICINE

## 2022-12-12 PROCEDURE — 4004F PT TOBACCO SCREEN RCVD TLK: CPT | Performed by: INTERNAL MEDICINE

## 2022-12-12 PROCEDURE — 3017F COLORECTAL CA SCREEN DOC REV: CPT | Performed by: INTERNAL MEDICINE

## 2022-12-12 NOTE — PROGRESS NOTES
CARDIOLOGY FOLLOW-UP VISIT        Patient Name: Lucero Montana  Primary Care physician: Cristy Black DO  Reason for Referral/Chief complaint: Lucero Montana  1955 is here to cardiac clinic for follow up of atrial fibrillation and cardiomyopathy. Subjective:     Lucero Montana is a 74 y.o. with a past medical history notable for atrial fibrillation, congestive heart failure, cardiomyopathy with recovered EF, and COPD. During prior admission, Echocardiogram showed moderate LV dysfunction with EF 35-40 %. Stress test was negative for inducible ischemia. This was controlled with amiodarone and beta-blocker. In the [facility] he had an Echocardiogram on 7/14/22 that demonstrated recovery of LV function with EF 55%, mild concentric left ventricular hypertrophy, aortic root mildly dilated. Moderate localized pericardial effusion vs. Fat pad located near right ventricle and apex. No evidence of tamponade. Today, patient again presents with his Sister Frankie Leonard. He reports feeling fatigued. States his nose seems congested. He is more short of breath. Coughing up inc amount sputum. Reports he does his inhalers, but does not like to do the nebulizer. He is in a wheelchair and has an O2 tank. [place] O2 on 2L which has been his traditional requirement. Noted he struggled with O2 and getting out of car today, BP was high, O2 sat low on entering office. Sat now recovering on my evaluation. He continues smoking 5 cigarettes a day. He states his chest feels heavy when he lays down. Denies chest pressure or heaviness with activity. Weight has increased 12 pounds since the summer. No increasing edema. States he does get a little dizzy when his O2 is low. He denies any evidence of hematemesis, hemoptysis, melena, hematochezia or hematuria with blood thinner. The patient is compliant with medications. Cost of medications is affordable. No endorsed side effects.       Home Medications:  Were reviewed and are listed in nursing record and/or below  Prior to Admission medications    Medication Sig Start Date End Date Taking? Authorizing Provider   ipratropium-albuterol (DUONEB) 0.5-2.5 (3) MG/3ML SOLN nebulizer solution Inhale 3 mLs into the lungs every 4 hours as needed for Shortness of Breath 12/2/22  Yes LÁZARO Zheng   sacubitril-valsartan (ENTRESTO) 24-26 MG per tablet Take 1 tablet by mouth twice daily. 11/17/22  Yes Trino Barton MD   fluticasone-umeclidin-vilant (TRELEGY ELLIPTA) 462-53.3-06 MCG/INH AEPB Inhale 1 puff into the lungs daily 10/28/22  Yes Rubi Joseph PA-C   albuterol sulfate HFA (PROVENTIL;VENTOLIN;PROAIR) 108 (90 Base) MCG/ACT inhaler Inhale 2 puffs into the lungs every 4 hours as needed for Wheezing or Shortness of Breath 10/17/22  Yes Rubi Joseph PA-C   atorvastatin (LIPITOR) 40 MG tablet Take 1 tablet by mouth at bedtime 6/27/22  Yes Trino Barton MD   furosemide (LASIX) 20 MG tablet Take 1 tablet by mouth daily 12/17/21  Yes Trino Barton MD   rivaroxaban Jaime Cerise) 20 MG TABS tablet Take 1 tablet by mouth daily (with breakfast) 12/17/21  Yes Trino Barton MD   metoprolol succinate (TOPROL XL) 200 MG extended release tablet Take 1 tablet by mouth daily 12/17/21 12/12/22 Yes Trino Barton MD        CURRENT Medications:  No current facility-administered medications for this visit. Allergies:  Patient has no known allergies. Review of Systems:   A 14 point review of symptoms completed. Pertinent positives identified in the HPI, all other review of symptoms negative.        Objective:     Vitals:    12/12/22 1447 12/12/22 1449   BP: (!) 168/76 (!) 170/72   Pulse: 80    SpO2: (!) 82%    Weight: 248 lb (112.5 kg)    Height: 5' 9\" (1.753 m)        Wt Readings from Last 3 Encounters:   12/12/22 248 lb (112.5 kg)   06/27/22 236 lb (107 kg)   06/07/22 239 lb (108.4 kg)       PHYSICAL EXAM:    General:  Older appearing than stated age, resting comfortably in wheelchair, appears short of breath with conversation today   Head:  Normocephalic, atraumatic   Eyes:  Conjunctiva/corneas clear, anicteric sclerae    Nose: Nares normal, no drainage or sinus tenderness   Throat: No abnormalities of the lips, oral mucosa or tongue. Neck: Trachea midline. Neck supple with no lymphadenopathy, thyroid not enlarged, symmetric, no tenderness/mass/nodules   Lungs:   Diminished breath sounds throughout, no luis wheezes or rales. Sats drop below 90% on 2 L, recover with 3 L oxygen   Chest Wall:  No deformity or tenderness to palpation   Heart:  Regular rate and rhythm with occasional premature beats, distant heart sounds, no murmur, no rub, no S3/S4, PMI non-palpable   Abdomen:   Obese abdomen, soft, non-tender, with normoactive bowel sounds. No masses, no hepatosplenomegaly   Extremities: No cyanosis, clubbing, no pitting edema. Upper extremities appear mottled. Vascular: 2+ radial, dec dorsalis pedis and posterior tibial pulses bilaterally. No bruits heard. Skin: Skin color, texture, turgor are normal with no rashes or ulceration. Pysch: Euthymic mood, appropriate affect   Neurologic: Oriented to person, place and time. No slurred speech or facial asymmetry. No motor or sensory deficits on gross examination.          Labs:   CBC:   Lab Results   Component Value Date/Time    WBC 6.0 10/19/2021 12:30 PM    RBC 4.38 10/19/2021 12:30 PM    HGB 14.0 10/19/2021 12:30 PM    HCT 41.1 10/19/2021 12:30 PM    MCV 93.9 10/19/2021 12:30 PM    RDW 13.6 10/19/2021 12:30 PM     10/19/2021 12:30 PM     CMP:  Lab Results   Component Value Date/Time     03/01/2022 02:05 PM    K 4.3 03/01/2022 02:05 PM    K 4.2 09/23/2021 06:14 AM    CL 98 03/01/2022 02:05 PM    CO2 26 03/01/2022 02:05 PM    BUN 11 03/01/2022 02:05 PM    CREATININE 1.0 03/01/2022 02:05 PM    GFRAA >60 03/01/2022 02:05 PM    AGRATIO 1.3 09/23/2021 06:14 AM    LABGLOM >60 03/01/2022 02:05 PM GLUCOSE 115 03/01/2022 02:05 PM    PROT 6.4 09/23/2021 06:14 AM    CALCIUM 9.3 03/01/2022 02:05 PM    BILITOT 1.1 09/23/2021 06:14 AM    ALKPHOS 70 09/23/2021 06:14 AM    AST 60 09/23/2021 06:14 AM     09/23/2021 06:14 AM     PT/INR:  No results found for: PTINR  HgBA1c:  Lab Results   Component Value Date    LABA1C 5.4 09/15/2021     Lab Results   Component Value Date    TROPONINI <0.01 09/21/2021     Lab Results   Component Value Date    CHOL 222 (H) 09/23/2021     Lab Results   Component Value Date    TRIG 223 (H) 09/23/2021    TRIG 214 (H) 09/17/2021     Lab Results   Component Value Date    HDL 35 (L) 09/23/2021     Lab Results   Component Value Date    LDLCALC 142 (H) 09/23/2021     Lab Results   Component Value Date    LABVLDL 45 09/23/2021     No results found for: [facility]      Interval Testing/Data:   CT Chest: 06/07/2022  FINDINGS: Mediastinum: Thyroid gland is unremarkable. Atherosclerotic change is seen in the thoracic aorta. Small calcified and noncalcified mediastinal nodes are noted. Aortic valve calcification is seen. Aortic annulus calcification is seen. Coronary artery calcification is seen. Small hiatal hernia is seen. There is nonspecific thickening at the GE junction     Limited Echo 7/14/22  Summary   Technically difficult examination. -- Left ventricular systolic function is normal with ejection fraction   estimated at 55%. No regional wall motion abnormalities are noted. There is   mild concentric left ventricular hypertrophy. -- Right ventricular systolic function is normal.   -- The aortic root is mildly dilated. There is a moderate localized pericardial effusion near right ventricle and   apex. Possible large fat pad but pericardial space not well seen. There is   no evidence of tamponade. 9-, 35-40% global , LVH, mild to mod RV dysf. ECHO 9/14/2021:   Summary  Right ventricular systolic function is mildly to moderately reduced .   The right atrium is mildly dilated. Mild posterior mitral annular calcification is present. Aortic valve sclerosis without aortic stenosis. Normal systolic pulmonary artery pressure (SPAP) estimated at 33 mmHg (RA pressure 15 mmHg). Left ventricular systolic function is moderately reduced with ejection fraction estimated at 35-40 %. There is moderate global hypokinesis present. Left ventricle size is normal.  There is mild concentric left ventricular hypertrophy. Elevated left ventricular diastolic filling pressure: Septal E/e'' = 12.2. LEXISCAN: 9/14/21  Summary  Normal LV function. There is normal isotope uptake at stress and rest. There is no evidence of  myocardial ischemia or scar. Low risk study       Impression and Plan      Acute worsening oxygen requirement/hypoxia today   Cardiomyopathy with BiV dysfunction, moderate LV dysfunction with recovery of LVEF by most recent Echo   Chronic congestive heart failure with reduced EF, compensated by exam today   Atrial fibrillation, paroxysmal, maintaining sinus by exam  Coronary artery disease by CT   COPD, severe, with chronic hypoxic respiratory failure, 2L nasal cannula  Lung lesion, follows with pulmonary/Dr. Saint Sieving     The 10-year ASCVD risk score (Brian DK, et al., 2019) is: 39.5%    Values used to calculate the score:      Age: 74 years      Sex: Male      Is Non- : No      Diabetic: No      Tobacco smoker: Yes      Systolic Blood Pressure: 306 mmHg      Is BP treated: Yes      HDL Cholesterol: 35 mg/dL      Total Cholesterol: 222 mg/dL      Patient Active Problem List   Diagnosis    Acute respiratory failure with hypoxia ([address]    Multifocal pneumonia    Suspected COVID-19 virus infection    Septicemia ([address]    Acute on chronic respiratory failure with hypoxia ([address]    Pneumonia due to COVID-19 virus    Atrial fibrillation with RVR (HCC)    Cardiomyopathy ([address]    Biventricular heart failure ([address]    Essential hypertension       PLAN:  1. Patient presents today with his sister. Complains of increased sputum production, shortness of breath. Does not appear overtly volume overloaded by exam.  Compliant with medications per discussion with his sister today who helps with administration. He is taking his inhaler regimen as well as albuterol. Appears he is having worsening desaturation with activity. May need adjustment at least in the near term of his oxygen prescription to allow higher dose with activity. I asked him to discuss possible need for steroid burst with Dr. Derek Leslie whom he sees tomorrow. 2.  I Strongly advised complete smoking cessation. Resources given to assist quitting including the followin-QUIT NOW. 3.  Continue GDMT with metoprolol, entresto - last labs reviewed 3/2022  4. Continue DOAC (no aspirin) and statin   5. No further cardiac testing planned at this time       Follow up with me in  4 months     Marisolibe's attestation: This note was scribed in the presence of Dr. Jeovany Recinos MD by Sotero Carr RN    The scribes documentation has been prepared under my direction and personally reviewed by me in its entirety. I confirm that the note above accurately reflects all work, treatment, procedures, and medical decision making performed by me. Aureliano Agudelo MD, personally performed the services described in this documentation as scribed by Sotero Carr RN in my presence, and it is both accurate and complete to the best of our ability. I will address the patient's cardiac risk factors and adjusted pharmacologic treatment as needed. In addition, I have reinforced the need for patient directed risk factor modification. All questions and concerns were addressed to the patient/family. Alternatives to my treatment were discussed. Thank you for allowing us to participate in the care of Rose Marie Espinoza. Please call me with any questions [phone].     Ella Forte MD, [facility]  Cardiovascular Disease  [facility]  [phone] [facility]  [phone] [address] [place] [address]  12/12/2022 2:56 PM

## 2022-12-12 NOTE — LETTER
Aidee Boyer  1955          CARDIOLOGY FOLLOW-UP VISIT        Patient Name: Aidee Boyer  Primary Care physician: Herlinda Vogt DO  Reason for Referral/Chief complaint: Aidee Boyer  1955 is here to cardiac clinic for follow up of atrial fibrillation and cardiomyopathy. Subjective:     Aidee Boyer is a 74 y.o. with a past medical history notable for atrial fibrillation, congestive heart failure, cardiomyopathy with recovered EF, and COPD. During prior admission, Echocardiogram showed moderate LV dysfunction with EF 35-40 %. Stress test was negative for inducible ischemia. This was controlled with amiodarone and beta-blocker. In the [facility] he had an Echocardiogram on 7/14/22 that demonstrated recovery of LV function with EF 55%, mild concentric left ventricular hypertrophy, aortic root mildly dilated. Moderate localized pericardial effusion vs. Fat pad located near right ventricle and apex. No evidence of tamponade. Today, patient again presents with his Sister Sandeep Falcon. He reports feeling fatigued. States his nose seems congested. He is more short of breath. Coughing up inc amount sputum. Reports he does his inhalers, but does not like to do the nebulizer. He is in a wheelchair and has an O2 tank. [place] O2 on 2L which has been his traditional requirement. Noted he struggled with O2 and getting out of car today, BP was high, O2 sat low on entering office. Sat now recovering on my evaluation. He continues smoking 5 cigarettes a day. He states his chest feels heavy when he lays down. Denies chest pressure or heaviness with activity. Weight has increased 12 pounds since the summer. No increasing edema. States he does get a little dizzy when his O2 is low. He denies any evidence of hematemesis, hemoptysis, melena, hematochezia or hematuria with blood thinner. The patient is compliant with medications. Cost of medications is affordable.   No endorsed side effects. Home Medications:  Were reviewed and are listed in nursing record and/or below  Prior to Admission medications    Medication Sig Start Date End Date Taking? Authorizing Provider   ipratropium-albuterol (DUONEB) 0.5-2.5 (3) MG/3ML SOLN nebulizer solution Inhale 3 mLs into the lungs every 4 hours as needed for Shortness of Breath 12/2/22  Yes Anatoly Tierney PA-C   sacubitril-valsartan (ENTRESTO) 24-26 MG per tablet Take 1 tablet by mouth twice daily. 11/17/22  Yes Trav Barreto MD   fluticasone-umeclidin-vilant (TRELEGY ELLIPTA) 291-90.5-32 MCG/INH AEPB Inhale 1 puff into the lungs daily 10/28/22  Yes Rubi Joseph PA-C   albuterol sulfate HFA (PROVENTIL;VENTOLIN;PROAIR) 108 (90 Base) MCG/ACT inhaler Inhale 2 puffs into the lungs every 4 hours as needed for Wheezing or Shortness of Breath 10/17/22  Yes Rubi Joseph PA-C   atorvastatin (LIPITOR) 40 MG tablet Take 1 tablet by mouth at bedtime 6/27/22  Yes Trav Barreto MD   furosemide (LASIX) 20 MG tablet Take 1 tablet by mouth daily 12/17/21  Yes Trav Barreto MD   rivaroxaban Charlotte Rom) 20 MG TABS tablet Take 1 tablet by mouth daily (with breakfast) 12/17/21  Yes Trav Barreto MD   metoprolol succinate (TOPROL XL) 200 MG extended release tablet Take 1 tablet by mouth daily 12/17/21 12/12/22 Yes Trav Barreto MD        CURRENT Medications:  No current facility-administered medications for this visit. Allergies:  Patient has no known allergies. Review of Systems:   A 14 point review of symptoms completed. Pertinent positives identified in the HPI, all other review of symptoms negative.        Objective:     Vitals:    12/12/22 1447 12/12/22 1449   BP: (!) 168/76 (!) 170/72   Pulse: 80    SpO2: (!) 82%    Weight: 248 lb (112.5 kg)    Height: 5' 9\" (1.753 m)        Wt Readings from Last 3 Encounters:   12/12/22 248 lb (112.5 kg)   06/27/22 236 lb (107 kg)   06/07/22 239 lb (108.4 kg)       PHYSICAL EXAM:    General:  Older appearing than stated age, resting comfortably in wheelchair, appears short of breath with conversation today   Head:  Normocephalic, atraumatic   Eyes:  Conjunctiva/corneas clear, anicteric sclerae    Nose: Nares normal, no drainage or sinus tenderness   Throat: No abnormalities of the lips, oral mucosa or tongue. Neck: Trachea midline. Neck supple with no lymphadenopathy, thyroid not enlarged, symmetric, no tenderness/mass/nodules   Lungs:   Diminished breath sounds throughout, no luis wheezes or rales. Sats drop below 90% on 2 L, recover with 3 L oxygen   Chest Wall:  No deformity or tenderness to palpation   Heart:  Regular rate and rhythm with occasional premature beats, distant heart sounds, no murmur, no rub, no S3/S4, PMI non-palpable   Abdomen:   Obese abdomen, soft, non-tender, with normoactive bowel sounds. No masses, no hepatosplenomegaly   Extremities: No cyanosis, clubbing, no pitting edema. Upper extremities appear mottled. Vascular: 2+ radial, dec dorsalis pedis and posterior tibial pulses bilaterally. No bruits heard. Skin: Skin color, texture, turgor are normal with no rashes or ulceration. Pysch: Euthymic mood, appropriate affect   Neurologic: Oriented to person, place and time. No slurred speech or facial asymmetry. No motor or sensory deficits on gross examination.          Labs:   CBC:   Lab Results   Component Value Date/Time    WBC 6.0 10/19/2021 12:30 PM    RBC 4.38 10/19/2021 12:30 PM    HGB 14.0 10/19/2021 12:30 PM    HCT 41.1 10/19/2021 12:30 PM    MCV 93.9 10/19/2021 12:30 PM    RDW 13.6 10/19/2021 12:30 PM     10/19/2021 12:30 PM     CMP:  Lab Results   Component Value Date/Time     03/01/2022 02:05 PM    K 4.3 03/01/2022 02:05 PM    K 4.2 09/23/2021 06:14 AM    CL 98 03/01/2022 02:05 PM    CO2 26 03/01/2022 02:05 PM    BUN 11 03/01/2022 02:05 PM    CREATININE 1.0 03/01/2022 02:05 PM    GFRAA >60 03/01/2022 02:05 PM    AGRATIO 1.3 09/23/2021 06:14 AM    SHAINA >60 03/01/2022 02:05 PM    GLUCOSE 115 03/01/2022 02:05 PM    PROT 6.4 09/23/2021 06:14 AM    CALCIUM 9.3 03/01/2022 02:05 PM    BILITOT 1.1 09/23/2021 06:14 AM    ALKPHOS 70 09/23/2021 06:14 AM    AST 60 09/23/2021 06:14 AM     09/23/2021 06:14 AM     PT/INR:  No results found for: PTINR  HgBA1c:  Lab Results   Component Value Date    LABA1C 5.4 09/15/2021     Lab Results   Component Value Date    TROPONINI <0.01 09/21/2021     Lab Results   Component Value Date    CHOL 222 (H) 09/23/2021     Lab Results   Component Value Date    TRIG 223 (H) 09/23/2021    TRIG 214 (H) 09/17/2021     Lab Results   Component Value Date    HDL 35 (L) 09/23/2021     Lab Results   Component Value Date    LDLCALC 142 (H) 09/23/2021     Lab Results   Component Value Date    LABVLDL 45 09/23/2021     No results found for: [facility]      Interval Testing/Data:   CT Chest: 06/07/2022  FINDINGS: Mediastinum: Thyroid gland is unremarkable. Atherosclerotic change is seen in the thoracic aorta. Small calcified and noncalcified mediastinal nodes are noted. Aortic valve calcification is seen. Aortic annulus calcification is seen. Coronary artery calcification is seen. Small hiatal hernia is seen. There is nonspecific thickening at the GE junction     Limited Echo 7/14/22  Summary   Technically difficult examination. -- Left ventricular systolic function is normal with ejection fraction   estimated at 55%. No regional wall motion abnormalities are noted. There is   mild concentric left ventricular hypertrophy. -- Right ventricular systolic function is normal.   -- The aortic root is mildly dilated. There is a moderate localized pericardial effusion near right ventricle and   apex. Possible large fat pad but pericardial space not well seen. There is   no evidence of tamponade. 9-, 35-40% global , LVH, mild to mod RV dysf.      ECHO 9/14/2021:   Summary  Right ventricular systolic function is mildly to moderately reduced . The right atrium is mildly dilated. Mild posterior mitral annular calcification is present. Aortic valve sclerosis without aortic stenosis. Normal systolic pulmonary artery pressure (SPAP) estimated at 33 mmHg (RA pressure 15 mmHg). Left ventricular systolic function is moderately reduced with ejection fraction estimated at 35-40 %. There is moderate global hypokinesis present. Left ventricle size is normal.  There is mild concentric left ventricular hypertrophy. Elevated left ventricular diastolic filling pressure: Septal E/e'' = 12.2. LEXISCAN: 9/14/21  Summary  Normal LV function. There is normal isotope uptake at stress and rest. There is no evidence of  myocardial ischemia or scar.   Low risk study       Impression and Plan      Acute worsening oxygen requirement/hypoxia today   Cardiomyopathy with BiV dysfunction, moderate LV dysfunction with recovery of LVEF by most recent Echo   Chronic congestive heart failure with reduced EF, compensated by exam today   Atrial fibrillation, paroxysmal, maintaining sinus by exam  Coronary artery disease by CT   COPD, severe, with chronic hypoxic respiratory failure, 2L nasal cannula  Lung lesion, follows with pulmonary/Dr. Allyn Becker     The 10-year ASCVD risk score (Brian DK, et al., 2019) is: 39.5%    Values used to calculate the score:      Age: 74 years      Sex: Male      Is Non- : No      Diabetic: No      Tobacco smoker: Yes      Systolic Blood Pressure: 593 mmHg      Is BP treated: Yes      HDL Cholesterol: 35 mg/dL      Total Cholesterol: 222 mg/dL      Patient Active Problem List   Diagnosis    Acute respiratory failure with hypoxia ([address]    Multifocal pneumonia    Suspected COVID-19 virus infection    Septicemia ([address]    Acute on chronic respiratory failure with hypoxia ([address]    Pneumonia due to COVID-19 virus    Atrial fibrillation with RVR ([address]    Cardiomyopathy ([address]    Biventricular heart failure ([address]    Essential hypertension       PLAN:  1. Patient presents today with his sister. Complains of increased sputum production, shortness of breath. Does not appear overtly volume overloaded by exam.  Compliant with medications per discussion with his sister today who helps with administration. He is taking his inhaler regimen as well as albuterol. Appears he is having worsening desaturation with activity. May need adjustment at least in the near term of his oxygen prescription to allow higher dose with activity. I asked him to discuss possible need for steroid burst with Dr. Zamzam Das whom he sees tomorrow. 2.  I Strongly advised complete smoking cessation. Resources given to assist quitting including the followin-800-QUIT NOW. 3.  Continue GDMT with metoprolol, entresto - last labs reviewed 3/2022  4. Continue DOAC (no aspirin) and statin   5. No further cardiac testing planned at this time       Follow up with me in  4 months     Scribe's attestation: This note was scribed in the presence of Dr. Jaison Wan MD by Ritesh Jorge RN    The scribes documentation has been prepared under my direction and personally reviewed by me in its entirety. I confirm that the note above accurately reflects all work, treatment, procedures, and medical decision making performed by me. Jaya Haji MD, personally performed the services described in this documentation as scribed by Ritesh Jorge RN in my presence, and it is both accurate and complete to the best of our ability. I will address the patient's cardiac risk factors and adjusted pharmacologic treatment as needed. In addition, I have reinforced the need for patient directed risk factor modification. All questions and concerns were addressed to the patient/family. Alternatives to my treatment were discussed. Thank you for allowing us to participate in the care of Brett Trevizo. Please call me with any questions [phone]. Markos Kruger

## 2022-12-12 NOTE — PATIENT INSTRUCTIONS
PLAN:  1. Discuss the need for possible steroids with Dr. Diamond Alvarez. 2.  Highly recommend you stop smoking  3. No change in medications today   4. No cardiac testing warranted at this time.

## 2022-12-13 ENCOUNTER — TELEPHONE (OUTPATIENT)
Dept: PULMONOLOGY | Age: 67
End: 2022-12-13

## 2022-12-13 ENCOUNTER — HOSPITAL ENCOUNTER (OUTPATIENT)
Dept: CT IMAGING | Age: 67
Discharge: HOME | End: 2022-12-13
Payer: MEDICARE

## 2022-12-13 ENCOUNTER — OFFICE VISIT (OUTPATIENT)
Dept: PULMONOLOGY | Age: 67
End: 2022-12-13
Payer: MEDICARE

## 2022-12-13 VITALS
SYSTOLIC BLOOD PRESSURE: 130 MMHG | RESPIRATION RATE: 18 BRPM | DIASTOLIC BLOOD PRESSURE: 65 MMHG | HEIGHT: 70 IN | OXYGEN SATURATION: 95 % | HEART RATE: 53 BPM | BODY MASS INDEX: 36.1 KG/M2

## 2022-12-13 DIAGNOSIS — R91.1 PULMONARY NODULE: ICD-10-CM

## 2022-12-13 DIAGNOSIS — J44.9 CHRONIC OBSTRUCTIVE PULMONARY DISEASE, UNSPECIFIED COPD TYPE (HCC): ICD-10-CM

## 2022-12-13 DIAGNOSIS — R91.1 RIGHT UPPER LOBE PULMONARY NODULE: Primary | ICD-10-CM

## 2022-12-13 PROCEDURE — G8417 CALC BMI ABV UP PARAM F/U: HCPCS | Performed by: INTERNAL MEDICINE

## 2022-12-13 PROCEDURE — G8427 DOCREV CUR MEDS BY ELIG CLIN: HCPCS | Performed by: INTERNAL MEDICINE

## 2022-12-13 PROCEDURE — G8484 FLU IMMUNIZE NO ADMIN: HCPCS | Performed by: INTERNAL MEDICINE

## 2022-12-13 PROCEDURE — 71250 CT THORAX DX C-: CPT

## 2022-12-13 PROCEDURE — 4004F PT TOBACCO SCREEN RCVD TLK: CPT | Performed by: INTERNAL MEDICINE

## 2022-12-13 PROCEDURE — 3078F DIAST BP <80 MM HG: CPT | Performed by: INTERNAL MEDICINE

## 2022-12-13 PROCEDURE — 99214 OFFICE O/P EST MOD 30 MIN: CPT | Performed by: INTERNAL MEDICINE

## 2022-12-13 PROCEDURE — 3074F SYST BP LT 130 MM HG: CPT | Performed by: INTERNAL MEDICINE

## 2022-12-13 PROCEDURE — 3023F SPIROM DOC REV: CPT | Performed by: INTERNAL MEDICINE

## 2022-12-13 PROCEDURE — 3017F COLORECTAL CA SCREEN DOC REV: CPT | Performed by: INTERNAL MEDICINE

## 2022-12-13 PROCEDURE — 1123F ACP DISCUSS/DSCN MKR DOCD: CPT | Performed by: INTERNAL MEDICINE

## 2022-12-13 RX ORDER — FLUTICASONE PROPIONATE 50 MCG
2 SPRAY, SUSPENSION (ML) NASAL DAILY
Qty: 1 EACH | Refills: 5 | Status: SHIPPED | OUTPATIENT
Start: 2022-12-13

## 2022-12-13 RX ORDER — PREDNISONE 10 MG/1
TABLET ORAL
Qty: 30 TABLET | Refills: 0 | Status: SHIPPED | OUTPATIENT
Start: 2022-12-13 | End: 2022-12-25

## 2022-12-13 NOTE — PROGRESS NOTES
Oxygen tested in office:    91% RA rest  1 min of walking - 89% on room air   2 min of walking - 85% added 1 liter  3 min of walking - 87% increased to 2 liters  4 min of walking - 89% increased to 2 liters  5 min of walking - 91%   6 min of walking - 91% on 2 liters  During 2 minutes of rest, patient maintained saturation of 90% on 91%.

## 2022-12-13 NOTE — TELEPHONE ENCOUNTER
Per Dr. Mirna Blackwood - pt needs f/u OV with [facility]. Schedules not open yet. Pt advised we'll call him in January once her schedule is open.

## 2022-12-13 NOTE — PROGRESS NOTES
Pulmonary & Critical Care Medicine ICU Progress Note  CC: Pulmonary Nodule   Referring provider: Hospital f/u    Interval History 12/13/22  - 2 weeks much more shortness of breath with cough and sputum, saw Dr. Nimesh Zhao yesterday. Desats with activity. Would like POC. Smoking 4-5 cig/day, trying to quit. Interval History: 2/16/2022  - Qualified for 2 L O2 after 2 mins ambulation LOV  - Dr. Nimesh Zhao d/c'd Amiodarone & Valsartan. Pt did not show for ECHO. - Here for 3 mo f/u CT Chest completed 2/11/22. Presenting HPI: 62 yo male with PMHx of Afib & recent multifocal CAP causing COPD exacerbation & subsequent intubation (admitted 9/14 - 9/23/21) who presents today for f/u of abnormal CT imaging during admission--irregular 2.4 cm RUL Pulmonary Nodule with right hilar lymphadenopathy, concerning for malignancy. Was d/c'd on 2 L O2, prednisone, & with nicotine patches. Required home health care d/t deconditioning in hospital.      reports that he has been smoking. He has never used smokeless tobacco.    PHYSICAL EXAM:  Blood pressure 130/65, pulse 53, resp. rate 18, height 5' 9.5\" (1.765 m), SpO2 95 %.' RA   Constitutional:  No acute distress. HENT:  Oropharynx is clear and moist.   Neck: No tracheal deviation present. Cardiovascular: Normal heart sounds. No lower extremity edema. Pulmonary/Chest: ++ wheezes. No rhonchi. No rales. No decreased breath sounds. No accessory muscle usage or stridor. Musculoskeletal: No cyanosis. No clubbing. Skin: Skin is warm and dry. Psychiatric: Normal mood and affect. Neurologic: speech fluent, alert and oriented, strength symmetric        DATA:  9/14/2021 SARS-CoV-2 NAAT and PCR negative  9/14/2021 blood NGTD  9/15/2021 urine antigens are negative  9/15/2021 tracheal aspirate strep pneumonia    Stress & TTE 9/17/2021  No evidence of myocardial ischemia, low risk study.   EF 35-40%  Elevated LV diastolic filling pressure  Normal SPAP estimated at 33 mmHg    CT PET 10/26/2021  HEAD/NECK: Severe atherosclerotic change seen in the carotid arteries. No hypermetabolic nodule in the thyroid gland. CHEST: There is underlying emphysema seen. When compared to prior chest CT there is seen improved aeration of the left lung base and right lung base with no significant consolidation remaining. There remains a dominant irregular nodule in the right upper lobe, which measures 2.4 cm by 1.6 cm, similar to prior, when measured at a similar level. Maximum SUV measures 1.71   No hypermetabolic mediastinal adenopathy. Calcified mediastinal nodes are noted moderate coronary artery calcification is seen. Aortic valve calcification is seen. ABDOMEN/PELVIS: Thickening and hypodensity seen in the adrenal glands bilaterally, likely due to adenoma or hyperplasia. No hypermetabolic adrenal mass. Atherosclerotic change seen in abdominal aorta. No hypermetabolic retroperitoneal adenopathy. There is subtle lobular contour to the liver. Atherosclerotic change seen in aorta. No hypermetabolic retroperitoneal adenopathy. There is a focal area of hypermetabolic uptake seen in the prostate posteriorly on the right. Maximum SUV measures 3.63. Spurring is seen in the spine. Spurring is seen in the hips   Focal area of increased uptake seen in the soft tissues, posterior to C7-T1, likely due to prior trauma-low-grade strain. .  Similar area of muscular uptake marginates the greater trochanter on the right. Impression   Right upper lobe irregular nodule persists. While it is not significantly hypermetabolic with maximum SUV measuring 1.71, its underlying appearance is suspicious for an early finding of lung carcinoma. Focal area of hypermetabolic uptake in the prostate posteriorly on the right. Recommend correlation with PSA level.      CT Chest 12/13/22 personally reviewed   Impression   Similar size and appearance of an irregular spiculated nodular opacity in the   right upper lobe, which extends to the pleura, dating back to a CT from   09/14/2021. Given stability greater than 1 year, a focal area of scarring is   favored, rather than a primary lung malignancy.         PFT 6/7/22 FEV1 0.77 L 23% TLC 3.89 L 56% DLCO 8.06 27%  6MWT 90%  ft    ASSESSMENT:  Severe COPD, emphysema with acute exacerbation   Pulmonary Nodule: RUL 2.4 X 1.6 cm nodule, mild uptake on PET imaging, stable Sept 2021 to Dec 2022  Right adrenal nodule 1.6 cm, PET imaging reassuring  Atrial fibrillation    Cardiomyopathy, biventricular dysfunction, improved to 55% from 35% under Dr. Megha Osborn excellent care   Tobacco abuse, 45 pack year history, (smoking 60+ years), cutting down but not ready to quit completely   H/o alcohol abuse   [place]    PLAN:  Prednisone taper today  Trelegy daily, albuterol PRN   Add flonase for sinus congestion   Tobacco cessation recommended again today   CT CHEST no IV dye in12 months, see me after  On Xarelto per cardiology   NOK brother (sister-in-law) [address] [phone]   Qualify for POC today please -- [facility]   See CMT in 6 months

## 2022-12-14 ENCOUNTER — TELEPHONE (OUTPATIENT)
Dept: PULMONOLOGY | Age: 67
End: 2022-12-14

## 2022-12-14 NOTE — TELEPHONE ENCOUNTER
Returned call to [facility] and L/CRISELDA informing that we will send order to [employer] to try to get POC covered and to call office if they don't get a call from [employer] to get pt set up.

## 2022-12-14 NOTE — TELEPHONE ENCOUNTER
Received correspondence from [address] [place] [address] about POC. Unable to do due to current smoker. Scanned correspondence attached to encounter.

## 2022-12-21 NOTE — TELEPHONE ENCOUNTER
Spoke with Artem at [employer] to confirm that orders were rec'd and pt is ready to be set up. [employer] attempted to reach pt to discuss benefits without success, but will reach back out to pt. Spoke with [facility] and informed.

## 2022-12-29 DIAGNOSIS — I10 ESSENTIAL HYPERTENSION: ICD-10-CM

## 2022-12-29 DIAGNOSIS — I42.9 CARDIOMYOPATHY, UNSPECIFIED TYPE (HCC): ICD-10-CM

## 2022-12-29 DIAGNOSIS — I48.91 ATRIAL FIBRILLATION WITH RVR (HCC): ICD-10-CM

## 2022-12-29 RX ORDER — FUROSEMIDE 20 MG/1
20 TABLET ORAL DAILY
Qty: 90 TABLET | Refills: 2 | Status: SHIPPED | OUTPATIENT
Start: 2022-12-29

## 2022-12-29 RX ORDER — METOPROLOL SUCCINATE 200 MG/1
200 TABLET, EXTENDED RELEASE ORAL DAILY
Qty: 90 TABLET | Refills: 2 | Status: SHIPPED | OUTPATIENT
Start: 2022-12-29 | End: 2023-03-29

## 2022-12-29 NOTE — TELEPHONE ENCOUNTER
Medication Refill    Medication needing refilled:  rivaroxaban (XARELTO)       Dosage of the medication: 20 mg     How are you taking this medication (QD, BID, TID, QID, PRN): QD with breakfast     30 or 90 day supply called in: 90     When will you run out of your medication: OUT NOW     Which Pharmacy are we sending the medication to?:      [address] [place] [address] [facility] [phone] - F [phone]

## 2022-12-29 NOTE — TELEPHONE ENCOUNTER
Medication Refill    Medication needing refilled: metoprolol succinate (TOPROL XL)      Dosage of the medication: 200 mg     How are you taking this medication (QD, BID, TID, QID, PRN): QD     30 or 90 day supply called in: 90     When will you run out of your medication: OUT NOW    Which Pharmacy are we sending the medication to?:    [address] [place] [address] [phone] - f [phone]

## 2023-02-06 ENCOUNTER — TELEPHONE (OUTPATIENT)
Dept: PULMONOLOGY | Age: 68
End: 2023-02-06

## 2023-02-06 RX ORDER — PREDNISONE 10 MG/1
TABLET ORAL
Qty: 30 TABLET | Refills: 0 | Status: SHIPPED | OUTPATIENT
Start: 2023-02-06 | End: 2023-02-18

## 2023-02-06 NOTE — TELEPHONE ENCOUNTER
Rec'd fax from [facility] asking for Prednisone taper refill on pt. Spoke with [facility] asking if pt is having symptoms. [facility] indicates that pt is having his normal cough with sputum, but nothing more than than. Pt is wanting the Prednisone Rx to keep on hand if needed as \"the kids keep bringing in sickness\". Script pending if appropriate.      OV 12/13/22:    ASSESSMENT:  Severe COPD, emphysema with acute exacerbation   Pulmonary Nodule: RUL 2.4 X 1.6 cm nodule, mild uptake on PET imaging, stable Sept 2021 to Dec 2022  Right adrenal nodule 1.6 cm, PET imaging reassuring  Atrial fibrillation    Cardiomyopathy, biventricular dysfunction, improved to 55% from 35% under Dr. Alejandra Clarke excellent care   Tobacco abuse, 45 pack year history, (smoking 60+ years), cutting down but not ready to quit completely   H/o alcohol abuse   [place]     PLAN:  Prednisone taper today  Trelegy daily, albuterol PRN   Add flonase for sinus congestion   Tobacco cessation recommended again today   CT CHEST no IV dye in12 months, see me after  On Xarelto per cardiology   NOK brother (sister-in-law) [address] [phone]   Qualify for POC today please -- [facility]   See CMT in 6 months

## 2023-04-27 DIAGNOSIS — J44.9 CHRONIC OBSTRUCTIVE PULMONARY DISEASE, UNSPECIFIED COPD TYPE (HCC): Primary | ICD-10-CM

## 2023-04-27 RX ORDER — ALBUTEROL SULFATE 90 UG/1
2 AEROSOL, METERED RESPIRATORY (INHALATION) EVERY 4 HOURS PRN
Qty: 18 G | Refills: 5 | Status: SHIPPED | OUTPATIENT
Start: 2023-04-27

## 2023-05-12 DIAGNOSIS — I48.91 ATRIAL FIBRILLATION WITH RVR (HCC): ICD-10-CM

## 2023-05-12 DIAGNOSIS — I42.9 CARDIOMYOPATHY, UNSPECIFIED TYPE (HCC): ICD-10-CM

## 2023-05-12 DIAGNOSIS — I10 ESSENTIAL HYPERTENSION: ICD-10-CM

## 2023-05-12 RX ORDER — ATORVASTATIN CALCIUM 40 MG/1
40 TABLET, FILM COATED ORAL NIGHTLY
Qty: 90 TABLET | Refills: 0 | Status: SHIPPED | OUTPATIENT
Start: 2023-05-12

## 2023-05-12 RX ORDER — FUROSEMIDE 20 MG/1
20 TABLET ORAL DAILY
Qty: 90 TABLET | Refills: 0 | Status: SHIPPED | OUTPATIENT
Start: 2023-05-12

## 2023-05-12 RX ORDER — METOPROLOL SUCCINATE 200 MG/1
200 TABLET, EXTENDED RELEASE ORAL DAILY
Qty: 90 TABLET | Refills: 0 | Status: SHIPPED | OUTPATIENT
Start: 2023-05-12 | End: 2023-08-10

## 2023-05-12 RX ORDER — SACUBITRIL AND VALSARTAN 24; 26 MG/1; MG/1
TABLET, FILM COATED ORAL
Qty: 180 TABLET | Refills: 0 | Status: SHIPPED | OUTPATIENT
Start: 2023-05-12

## 2023-05-12 NOTE — TELEPHONE ENCOUNTER
Received a fax from [address] [place] [address] that pt needs refills.   Last ov- 12/12/22 rjm  Upcoming ov- 06/06/23rjm  Medications have been pended for sign off

## 2023-05-30 RX ORDER — FLUTICASONE PROPIONATE 50 MCG
2 SPRAY, SUSPENSION (ML) NASAL DAILY
Qty: 1 EACH | Refills: 5 | Status: SHIPPED | OUTPATIENT
Start: 2023-05-30

## 2023-06-06 ENCOUNTER — OFFICE VISIT (OUTPATIENT)
Dept: CARDIOLOGY CLINIC | Age: 68
End: 2023-06-06
Payer: COMMERCIAL

## 2023-06-06 VITALS
WEIGHT: 242 LBS | HEART RATE: 83 BPM | SYSTOLIC BLOOD PRESSURE: 118 MMHG | BODY MASS INDEX: 35.84 KG/M2 | OXYGEN SATURATION: 94 % | DIASTOLIC BLOOD PRESSURE: 70 MMHG | HEIGHT: 69 IN

## 2023-06-06 DIAGNOSIS — I42.9 CARDIOMYOPATHY, UNSPECIFIED TYPE (HCC): ICD-10-CM

## 2023-06-06 DIAGNOSIS — I48.0 PAROXYSMAL ATRIAL FIBRILLATION (HCC): ICD-10-CM

## 2023-06-06 DIAGNOSIS — I25.10 CORONARY ARTERY DISEASE INVOLVING NATIVE CORONARY ARTERY OF NATIVE HEART WITHOUT ANGINA PECTORIS: Primary | ICD-10-CM

## 2023-06-06 DIAGNOSIS — I50.32 CHRONIC HEART FAILURE WITH PRESERVED EJECTION FRACTION (HCC): ICD-10-CM

## 2023-06-06 PROCEDURE — 99214 OFFICE O/P EST MOD 30 MIN: CPT | Performed by: INTERNAL MEDICINE

## 2023-06-06 PROCEDURE — 1123F ACP DISCUSS/DSCN MKR DOCD: CPT | Performed by: INTERNAL MEDICINE

## 2023-06-06 PROCEDURE — 3078F DIAST BP <80 MM HG: CPT | Performed by: INTERNAL MEDICINE

## 2023-06-06 PROCEDURE — 3074F SYST BP LT 130 MM HG: CPT | Performed by: INTERNAL MEDICINE

## 2023-06-06 NOTE — PROGRESS NOTES
CARDIOLOGY FOLLOW-UP VISIT        Patient Name: Steven Arias  Primary Care physician: Leonor Carrera DO  Reason for Referral/Chief complaint: Curly Shape  1955 is here to cardiac clinic for follow up of atrial fibrillation and cardiomyopathy. Subjective:     Steven Arias is a 79 y.o. with a past medical history notable for atrial fibrillation, congestive heart failure, cardiomyopathy with recovered EF, and COPD. Prior echocardiogram showed moderate LV dysfunction with EF 35-40 %. Stress test was negative for inducible ischemia. This was controlled with amiodarone and beta-blocker. In the [facility] he had an Echocardiogram on 7/14/22 that demonstrated recovery of LV function with EF 55%, mild concentric left ventricular hypertrophy, aortic root mildly dilated. Moderate localized pericardial effusion vs. Fat pad, no evidence of tamponade. LOV 12/12/22 presented with his Sister Dary Santiago. He reported continued smoking 5 cigarettes/day. Today he presents in a wheelchair with his sister. He states his breathing is no better, no worse. He has his oxygen with him, but is not wearing it during the visit. He states he has not had anymore nose bleeds in quite some time. He states he is tolerating his cardiac medications. He continues to follow with Dr. Nina Etienne for his pulmonary care. He states he is cold all the time. Denies chest pain/pressure/heaviness with exertion. Denies severe shortness of breath at rest.  Denies palpitations, dizziness, near-syncope or luis syncope. Denies paroxysmal nocturnal dyspnea, orthopnea, increasing lower extremity edema or rapid weight gain. Continues to smoke 5 cigarettes daily. He denies any evidence of hematemesis, hemoptysis, melena, hematochezia or hematuria with blood thinner. The patient is compliant with medications. Cost of medications is affordable. No endorsed side effects.       Home Medications:  Were reviewed

## 2023-06-06 NOTE — PATIENT INSTRUCTIONS
PLAN:  1. No change in medications today. Continue atorvastatin, furosemide, metoprolol, Xarelto and Entresto as well as your other medications. 2.  No cardiac testing warranted at this time.    3.  Highly recommend you STOP smoking all together   1-800-QUIT-NOW       Follow up 6 months with ALEX Busch

## 2023-06-09 RX ORDER — FLUTICASONE FUROATE, UMECLIDINIUM BROMIDE AND VILANTEROL TRIFENATATE 100; 62.5; 25 UG/1; UG/1; UG/1
1 POWDER RESPIRATORY (INHALATION) DAILY
Qty: 60 EACH | Refills: 5 | Status: SHIPPED | OUTPATIENT
Start: 2023-06-09

## 2023-06-16 PROBLEM — A41.9 SEPTICEMIA (HCC): Status: RESOLVED | Noted: 2021-09-14 | Resolved: 2023-06-16

## 2023-06-16 PROBLEM — Z20.822 SUSPECTED COVID-19 VIRUS INFECTION: Status: RESOLVED | Noted: 2021-09-14 | Resolved: 2023-06-16

## 2023-06-16 PROBLEM — J96.21 ACUTE ON CHRONIC RESPIRATORY FAILURE WITH HYPOXIA (HCC): Status: RESOLVED | Noted: 2021-09-14 | Resolved: 2023-06-16

## 2023-06-16 PROBLEM — J96.11 CHRONIC RESPIRATORY FAILURE WITH HYPOXIA (HCC): Status: ACTIVE | Noted: 2021-09-14

## 2023-06-16 PROBLEM — J96.11 CHRONIC RESPIRATORY FAILURE WITH HYPOXIA (HCC): Chronic | Status: ACTIVE | Noted: 2021-09-14

## 2023-06-16 PROBLEM — J18.9 MULTIFOCAL PNEUMONIA: Status: RESOLVED | Noted: 2021-09-14 | Resolved: 2023-06-16

## 2023-08-16 DIAGNOSIS — I10 ESSENTIAL HYPERTENSION: ICD-10-CM

## 2023-08-16 DIAGNOSIS — I42.9 CARDIOMYOPATHY, UNSPECIFIED TYPE (HCC): ICD-10-CM

## 2023-08-16 RX ORDER — SACUBITRIL AND VALSARTAN 24; 26 MG/1; MG/1
TABLET, FILM COATED ORAL
Qty: 180 TABLET | Refills: 0 | Status: SHIPPED | OUTPATIENT
Start: 2023-08-16

## 2023-08-29 DIAGNOSIS — I10 ESSENTIAL HYPERTENSION: ICD-10-CM

## 2023-08-29 DIAGNOSIS — I42.9 CARDIOMYOPATHY, UNSPECIFIED TYPE (HCC): ICD-10-CM

## 2023-08-29 RX ORDER — SACUBITRIL AND VALSARTAN 24; 26 MG/1; MG/1
TABLET, FILM COATED ORAL
Qty: 180 TABLET | Refills: 1 | OUTPATIENT
Start: 2023-08-29

## 2023-08-29 NOTE — TELEPHONE ENCOUNTER
Medication Refill    Medication needing refilled:Entresto    Dosage of the medication:24-26mg    30 or 90 day supply called in:90    When will you run out of your medication:Out    Which Pharmacy are we sending the medication to?:  [address] Meaghan Loza [phone] Margarito Louise [phone] (Pharmacy) contacted Courtney Renee MA (Fax)

## 2023-09-01 DIAGNOSIS — I10 ESSENTIAL HYPERTENSION: ICD-10-CM

## 2023-09-01 DIAGNOSIS — I42.9 CARDIOMYOPATHY, UNSPECIFIED TYPE (HCC): ICD-10-CM

## 2023-09-01 RX ORDER — SACUBITRIL AND VALSARTAN 24; 26 MG/1; MG/1
TABLET, FILM COATED ORAL
Qty: 180 TABLET | Refills: 0 | OUTPATIENT
Start: 2023-09-01

## 2023-09-01 NOTE — TELEPHONE ENCOUNTER
Last OV:06/08/2023  Future OV:12/11/2023 NMDE  BMP:03/01/2022  CBC:10/19/2021  Rx needs resent to correct pharmacy

## 2023-09-11 NOTE — TELEPHONE ENCOUNTER
Pt states pharmacy never received medication. Pt would like it sent to [address] Please advise.

## 2023-09-13 DIAGNOSIS — I42.9 CARDIOMYOPATHY, UNSPECIFIED TYPE (HCC): ICD-10-CM

## 2023-09-13 DIAGNOSIS — J44.9 CHRONIC OBSTRUCTIVE PULMONARY DISEASE, UNSPECIFIED COPD TYPE (HCC): ICD-10-CM

## 2023-09-13 DIAGNOSIS — I10 ESSENTIAL HYPERTENSION: ICD-10-CM

## 2023-09-13 DIAGNOSIS — I48.91 ATRIAL FIBRILLATION WITH RVR (HCC): ICD-10-CM

## 2023-09-13 RX ORDER — ATORVASTATIN CALCIUM 40 MG/1
40 TABLET, FILM COATED ORAL NIGHTLY
Qty: 90 TABLET | Refills: 0 | Status: SHIPPED | OUTPATIENT
Start: 2023-09-13

## 2023-09-13 RX ORDER — SACUBITRIL AND VALSARTAN 24; 26 MG/1; MG/1
TABLET, FILM COATED ORAL
Qty: 180 TABLET | Refills: 0 | Status: SHIPPED | OUTPATIENT
Start: 2023-09-13

## 2023-09-13 RX ORDER — FUROSEMIDE 20 MG/1
20 TABLET ORAL DAILY
Qty: 90 TABLET | Refills: 0 | Status: SHIPPED | OUTPATIENT
Start: 2023-09-13

## 2023-09-13 RX ORDER — METOPROLOL SUCCINATE 200 MG/1
200 TABLET, EXTENDED RELEASE ORAL DAILY
Qty: 90 TABLET | Refills: 0 | Status: SHIPPED | OUTPATIENT
Start: 2023-09-13 | End: 2023-12-12

## 2023-09-13 NOTE — TELEPHONE ENCOUNTER
Pt has been out of Entresto for a few weeks.  Prescription needs to be sent to:    [facility] [phone]

## 2023-09-13 NOTE — TELEPHONE ENCOUNTER
Pts sister called for refills of     furosemide (LASIX) 20 MG tablet  atorvastatin (LIPITOR) 40 MG tablet [  rivaroxaban (XARELTO) 20 MG TABS tablet [  metoprolol succinate (TOPROL XL) 200 MG extended release tablet     Preferred pharmacy is   [facility] [phone]     Last ov 06/06/2023 june  Next ov 12/11/2023 collin

## 2023-09-13 NOTE — TELEPHONE ENCOUNTER
Please resend medication. Rx was originally sent to wrong pharmacy.      Last OV:6/8/2023  Future OV:12/11/2023

## 2023-12-07 DIAGNOSIS — I42.9 CARDIOMYOPATHY, UNSPECIFIED TYPE (HCC): ICD-10-CM

## 2023-12-07 DIAGNOSIS — I10 ESSENTIAL HYPERTENSION: ICD-10-CM

## 2023-12-07 RX ORDER — ATORVASTATIN CALCIUM 40 MG/1
40 TABLET, FILM COATED ORAL NIGHTLY
Qty: 90 TABLET | Refills: 0 | Status: SHIPPED | OUTPATIENT
Start: 2023-12-07

## 2023-12-07 RX ORDER — SACUBITRIL AND VALSARTAN 24; 26 MG/1; MG/1
TABLET, FILM COATED ORAL
Qty: 180 TABLET | Refills: 0 | Status: SHIPPED | OUTPATIENT
Start: 2023-12-07

## 2023-12-12 ENCOUNTER — TELEPHONE (OUTPATIENT)
Dept: PULMONOLOGY | Age: 68
End: 2023-12-12

## 2023-12-12 DIAGNOSIS — R91.1 RIGHT UPPER LOBE PULMONARY NODULE: ICD-10-CM

## 2023-12-12 DIAGNOSIS — R91.1 PULMONARY NODULE: Primary | ICD-10-CM

## 2023-12-12 NOTE — TELEPHONE ENCOUNTER
Pt had to cancel CT for  due to illness. Pt will need a new order because current order will  after . Please advise.

## 2023-12-22 NOTE — TELEPHONE ENCOUNTER
Order signed.  If pt had a respiratory illness then he should wait ~3-4 weeks after until performing CT scan.  Visit with Mac will need scheduled as well.

## 2024-01-25 RX ORDER — FLUTICASONE PROPIONATE 50 MCG
SPRAY, SUSPENSION (ML) NASAL
Qty: 16 G | Refills: 5 | Status: SHIPPED | OUTPATIENT
Start: 2024-01-25

## 2024-01-25 NOTE — TELEPHONE ENCOUNTER
Refill Request     CONFIRM preferrred pharmacy with the patient.    If Mail Order Rx - Pend for 90 day refill.      Last Seen: Last Seen Department: 6/16/2023  Last Seen by PCP: 12/13/2022    Last Written: 05/30/2023    If no future appointment scheduled, route STAFF MESSAGE with patient name to the  Pool for scheduling.      Next Appointment:   Future Appointments   Date Time Provider Department Center   2/13/2024  3:00 PM [facility]   2/13/2024  3:30 PM Eliseo Watts MD [facility]       Message sent to  to schedule appt with patient?  NO      Requested Prescriptions     Pending Prescriptions Disp Refills    fluticasone (FLONASE) 50 MCG/ACT nasal spray [Pharmacy Med Name: fluticasone propionate 50 mcg/actuation nasal spray,suspension] 16 g 5     Sig: inhale TWO SPRAYS into each nostril ONCE daily      ASSESSMENT:  Severe COPD, emphysema   Pulmonary Nodule: RUL 2.4 x 1.6 cm nodule, mild uptake on PET imaging, stable Sept 2021 to Dec 2022  Chronic hypoxemic respiratory failure - baseline 2 L with exertion & HS  Right adrenal nodule 1.6 cm, PET imaging reassuring  AFIB   Cardiomyopathy, biventricular dysfunction, improved to 55% from 35% under Dr. Escamilla excellent care   Ongoing tobacco abuse, 45 pack years, (smoking 60+ years), down to 5-6 daily  H/o alcohol abuse        PLAN:  Trelegy daily  Albuterol PRN, has nebulizer  Supplemental O2 uses 2 L with exertion & HS.  Pt uses with benefit & is mobile within his home.  Has POC from [employer] ([facility] denied d/t smoking status)  Continue Flonase for sinus congestion   Tobacco cessation recommended again today   On Xarelto per cardiology   Keep scheduled 12 month CT CHEST no IV dye & f/u with Dr. Watts (6 months from now)  Fluconazole x7 days -- pt to call with resulting effect on hoarseness. Will need referral to ENT if persistent hoarseness ongoing x6 months.   MAXINE brother (sister-in-law) Hitesh Cagle [phone]

## 2024-02-01 NOTE — PROGRESS NOTES
noted. There is   mild concentric left ventricular hypertrophy.   -- Right ventricular systolic function is normal.   -- The aortic root is mildly dilated.   There is a moderate localized pericardial effusion near right ventricle and   apex. Possible large fat pad but pericardial space not well seen. There is   no evidence of tamponade.   9-, 35-40% global , LVH, mild to mod RV dysf.     CT 6/2022  Mediastinum: No new lymphadenopathy.  There is a similar borderline enlarged  right hilar lymph node.  There are calcified mediastinal lymph nodes.  No  pericardial effusion.  Coronary artery calcifications are seen.  The main  pulmonary artery measures 4.1 cm in diameter.  The ascending thoracic aorta  measures 3.8 cm in diameter.     Lungs/pleura: The central airways are patent.  No pleural effusion or  pneumothorax is seen.  Emphysema.  Again seen is an irregular spiculated  nodular opacity in the right upper lobe, with extends to abut the pleura.  Allowing for differences in measuring technique, this appears overall similar  to the prior CTs.  This measures approximally 2.6 x 3.4 x 2.9 cm in greatest  dimensions.  No new focal lung consolidation is identified.     ECHO 9/14/2021:   Summary  Right ventricular systolic function is mildly to moderately reduced .  The right atrium is mildly dilated.  Mild posterior mitral annular calcification is present.  Aortic valve sclerosis without aortic stenosis.  Normal systolic pulmonary artery pressure (SPAP) estimated at 33 mmHg (RA pressure 15 mmHg).  Left ventricular systolic function is moderately reduced with ejection fraction estimated at 35-40 %.  There is moderate global hypokinesis present.  Left ventricle size is normal.  There is mild concentric left ventricular hypertrophy.  Elevated left ventricular diastolic filling pressure: Septal E/e'' = 12.2.     LEXISCAN: 9/14/21  Summary  Normal LV function.  There is normal isotope uptake at stress and rest. There is

## 2024-02-06 ENCOUNTER — OFFICE VISIT (OUTPATIENT)
Dept: CARDIOLOGY CLINIC | Age: 69
End: 2024-02-06
Payer: MEDICARE

## 2024-02-06 VITALS
DIASTOLIC BLOOD PRESSURE: 60 MMHG | HEART RATE: 97 BPM | WEIGHT: 232 LBS | SYSTOLIC BLOOD PRESSURE: 120 MMHG | HEIGHT: 69 IN | OXYGEN SATURATION: 94 % | BODY MASS INDEX: 34.36 KG/M2

## 2024-02-06 DIAGNOSIS — I48.0 PAROXYSMAL ATRIAL FIBRILLATION (HCC): ICD-10-CM

## 2024-02-06 DIAGNOSIS — I42.8 OTHER CARDIOMYOPATHY (HCC): Primary | Chronic | ICD-10-CM

## 2024-02-06 DIAGNOSIS — I50.82 BIVENTRICULAR HEART FAILURE (HCC): ICD-10-CM

## 2024-02-06 DIAGNOSIS — J44.9 CHRONIC OBSTRUCTIVE PULMONARY DISEASE, UNSPECIFIED COPD TYPE (HCC): ICD-10-CM

## 2024-02-06 DIAGNOSIS — E78.2 MIXED HYPERLIPIDEMIA: ICD-10-CM

## 2024-02-06 DIAGNOSIS — I50.32 CHRONIC HEART FAILURE WITH PRESERVED EJECTION FRACTION (HCC): ICD-10-CM

## 2024-02-06 DIAGNOSIS — I25.10 CORONARY ARTERY CALCIFICATION SEEN ON CT SCAN: ICD-10-CM

## 2024-02-06 PROCEDURE — G8417 CALC BMI ABV UP PARAM F/U: HCPCS | Performed by: NURSE PRACTITIONER

## 2024-02-06 PROCEDURE — 3074F SYST BP LT 130 MM HG: CPT | Performed by: NURSE PRACTITIONER

## 2024-02-06 PROCEDURE — G8484 FLU IMMUNIZE NO ADMIN: HCPCS | Performed by: NURSE PRACTITIONER

## 2024-02-06 PROCEDURE — 3023F SPIROM DOC REV: CPT | Performed by: NURSE PRACTITIONER

## 2024-02-06 PROCEDURE — G8427 DOCREV CUR MEDS BY ELIG CLIN: HCPCS | Performed by: NURSE PRACTITIONER

## 2024-02-06 PROCEDURE — 3078F DIAST BP <80 MM HG: CPT | Performed by: NURSE PRACTITIONER

## 2024-02-06 PROCEDURE — 99214 OFFICE O/P EST MOD 30 MIN: CPT | Performed by: NURSE PRACTITIONER

## 2024-02-06 PROCEDURE — 1123F ACP DISCUSS/DSCN MKR DOCD: CPT | Performed by: NURSE PRACTITIONER

## 2024-02-06 PROCEDURE — 3017F COLORECTAL CA SCREEN DOC REV: CPT | Performed by: NURSE PRACTITIONER

## 2024-02-06 PROCEDURE — 4004F PT TOBACCO SCREEN RCVD TLK: CPT | Performed by: NURSE PRACTITIONER

## 2024-02-06 RX ORDER — M-VIT,TX,IRON,MINS/CALC/FOLIC 27MG-0.4MG
1 TABLET ORAL DAILY
COMMUNITY

## 2024-02-06 NOTE — PATIENT INSTRUCTIONS
Call [phone]- [phone] ([phone]) to schedule  -call and schedule echocardiogram (ultrasound of the heart)    Lab work: CMP, CBC and lipids (Fasting labs)    Continue same medications

## 2024-02-13 ENCOUNTER — TELEPHONE (OUTPATIENT)
Dept: PULMONOLOGY | Age: 69
End: 2024-02-13

## 2024-02-13 NOTE — TELEPHONE ENCOUNTER
Patient did not show for follow up ct  appointment  with Dr. Watts on 2/13/24    Same Day Cancellation: No    Patient rescheduled:  No    New appointment:     Patient was also no show on: n/a    LOV 6/16/23    ASSESSMENT:  Severe COPD, emphysema   Pulmonary Nodule: RUL 2.4 x 1.6 cm nodule, mild uptake on PET imaging, stable Sept 2021 to Dec 2022  Chronic hypoxemic respiratory failure - baseline 2 L with exertion & HS  Right adrenal nodule 1.6 cm, PET imaging reassuring  AFIB   Cardiomyopathy, biventricular dysfunction, improved to 55% from 35% under Dr. Escamilla excellent care   Ongoing tobacco abuse, 45 pack years, (smoking 60+ years), down to 5-6 daily  H/o alcohol abuse   [place]     PLAN:  Trelegy daily  Albuterol PRN, has nebulizer  Supplemental O2 uses 2 L with exertion & HS.  Pt uses with benefit & is mobile within his home.  Has POC from [employer] ([facility] denied d/t smoking status)  Continue Flonase for sinus congestion   Tobacco cessation recommended again today   On Xarelto per cardiology   Keep scheduled 12 month CT CHEST no IV dye & f/u with Dr. Watts (6 months from now)  Fluconazole x7 days -- pt to call with resulting effect on hoarseness. Will need referral to ENT if persistent hoarseness ongoing x6 months.   MAXINE brother (sister-in-law) Hitesh Cagle [phone]

## 2024-02-23 DIAGNOSIS — I42.9 CARDIOMYOPATHY, UNSPECIFIED TYPE (HCC): ICD-10-CM

## 2024-02-23 DIAGNOSIS — I10 ESSENTIAL HYPERTENSION: ICD-10-CM

## 2024-02-23 RX ORDER — SACUBITRIL AND VALSARTAN 24; 26 MG/1; MG/1
TABLET, FILM COATED ORAL
Qty: 180 TABLET | Refills: 2 | Status: SHIPPED | OUTPATIENT
Start: 2024-02-23

## 2024-02-23 RX ORDER — ATORVASTATIN CALCIUM 40 MG/1
40 TABLET, FILM COATED ORAL NIGHTLY
Qty: 90 TABLET | Refills: 2 | Status: SHIPPED | OUTPATIENT
Start: 2024-02-23

## 2024-02-27 DIAGNOSIS — I42.9 CARDIOMYOPATHY, UNSPECIFIED TYPE (HCC): ICD-10-CM

## 2024-02-27 DIAGNOSIS — I10 ESSENTIAL HYPERTENSION: ICD-10-CM

## 2024-02-27 DIAGNOSIS — I48.91 ATRIAL FIBRILLATION WITH RVR (HCC): ICD-10-CM

## 2024-02-29 RX ORDER — RIVAROXABAN 20 MG/1
TABLET, FILM COATED ORAL
Qty: 90 TABLET | Refills: 1 | Status: SHIPPED | OUTPATIENT
Start: 2024-02-29

## 2024-02-29 RX ORDER — METOPROLOL SUCCINATE 200 MG/1
200 TABLET, EXTENDED RELEASE ORAL DAILY
Qty: 90 TABLET | Refills: 1 | Status: SHIPPED | OUTPATIENT
Start: 2024-02-29

## 2024-04-01 ENCOUNTER — TELEPHONE (OUTPATIENT)
Dept: PULMONOLOGY | Age: 69
End: 2024-04-01

## 2024-04-01 DIAGNOSIS — J44.9 COPD, SEVERE (HCC): Primary | ICD-10-CM

## 2024-04-03 DIAGNOSIS — J44.9 COPD, SEVERE (HCC): Primary | ICD-10-CM

## 2024-04-04 RX ORDER — FLUTICASONE FUROATE, UMECLIDINIUM BROMIDE AND VILANTEROL TRIFENATATE 100; 62.5; 25 UG/1; UG/1; UG/1
1 POWDER RESPIRATORY (INHALATION) DAILY
Qty: 60 EACH | Refills: 5 | Status: SHIPPED | OUTPATIENT
Start: 2024-04-04

## 2024-04-12 ENCOUNTER — TELEPHONE (OUTPATIENT)
Dept: PULMONOLOGY | Age: 69
End: 2024-04-12

## 2024-04-18 ENCOUNTER — HOSPITAL ENCOUNTER (OUTPATIENT)
Dept: NON INVASIVE DIAGNOSTICS | Age: 69
Discharge: HOME | End: 2024-04-18
Payer: MEDICARE

## 2024-04-18 DIAGNOSIS — I42.8 OTHER CARDIOMYOPATHY (HCC): Chronic | ICD-10-CM

## 2024-04-18 DIAGNOSIS — I50.32 CHRONIC HEART FAILURE WITH PRESERVED EJECTION FRACTION (HCC): ICD-10-CM

## 2024-04-18 DIAGNOSIS — I50.82 BIVENTRICULAR HEART FAILURE (HCC): ICD-10-CM

## 2024-04-18 PROCEDURE — 93306 TTE W/DOPPLER COMPLETE: CPT

## 2024-04-23 ENCOUNTER — TELEPHONE (OUTPATIENT)
Dept: PULMONOLOGY | Age: 69
End: 2024-04-23

## 2024-04-23 DIAGNOSIS — J96.11 CHRONIC HYPOXEMIC RESPIRATORY FAILURE (HCC): Primary | ICD-10-CM

## 2024-04-24 ENCOUNTER — TELEPHONE (OUTPATIENT)
Dept: CARDIOLOGY CLINIC | Age: 69
End: 2024-04-24

## 2024-04-24 NOTE — TELEPHONE ENCOUNTER
----- Message from Diane M Enzweiler, APRN - CNP sent at 4/22/2024  5:06 PM EDT -----  RN: Reviewed with Dr. Escamilla    LVEF 55% and stable from last echo. RV function mildly reduced and with mild R sided enlargement, mod TR. Right ventricle suggests some volume overload; moderate pulmonary HTN (2/2 chronic lung disease).  Continue same medications.  Please call.

## 2024-04-29 DIAGNOSIS — I10 ESSENTIAL HYPERTENSION: ICD-10-CM

## 2024-04-29 RX ORDER — FUROSEMIDE 20 MG/1
20 TABLET ORAL DAILY
Qty: 90 TABLET | Refills: 1 | Status: SHIPPED | OUTPATIENT
Start: 2024-04-29

## 2024-04-29 NOTE — TELEPHONE ENCOUNTER
Medication Refill    Medication needing refilled:Lasix     Dosage of the medication:20MG    How are you taking this medication (QD, BID, TID, QID, PRN):BID    30 or 90 day supply called in:90    When will you run out of your medication:2 days    Which Pharmacy are we sending the medication to?:    [facility] [place] [address] Kaelyn HAGAN [phone] - F [phone]  [address] [place] [address]  Phone: [phone]  Fax: [phone]       Last OV:   24 NPDE  23 DARYL    Upcomin24 DARYL

## 2024-05-08 ENCOUNTER — HOSPITAL ENCOUNTER (OUTPATIENT)
Dept: CT IMAGING | Age: 69
Discharge: HOME | End: 2024-05-08
Attending: INTERNAL MEDICINE
Payer: MEDICARE

## 2024-05-08 DIAGNOSIS — R91.1 PULMONARY NODULE: ICD-10-CM

## 2024-05-08 DIAGNOSIS — R91.1 RIGHT UPPER LOBE PULMONARY NODULE: ICD-10-CM

## 2024-05-08 PROCEDURE — 71250 CT THORAX DX C-: CPT

## 2024-05-13 ENCOUNTER — TELEPHONE (OUTPATIENT)
Dept: PULMONOLOGY | Age: 69
End: 2024-05-13

## 2024-05-13 NOTE — TELEPHONE ENCOUNTER
Britni Gong called notifying the office that the CT performed on 5/8 was denied by the patient's insurance.   Case #: [id number]    [facility]#: [phone]

## 2024-05-18 ENCOUNTER — TELEPHONE (OUTPATIENT)
Dept: PULMONOLOGY | Age: 69
End: 2024-05-18

## 2024-05-23 ENCOUNTER — OFFICE VISIT (OUTPATIENT)
Dept: PULMONOLOGY | Age: 69
End: 2024-05-23
Payer: MEDICARE

## 2024-05-23 VITALS
HEIGHT: 69 IN | BODY MASS INDEX: 34.36 KG/M2 | HEART RATE: 78 BPM | RESPIRATION RATE: 20 BRPM | DIASTOLIC BLOOD PRESSURE: 70 MMHG | OXYGEN SATURATION: 90 % | SYSTOLIC BLOOD PRESSURE: 112 MMHG | WEIGHT: 232 LBS

## 2024-05-23 DIAGNOSIS — J96.11 CHRONIC HYPOXEMIC RESPIRATORY FAILURE (HCC): ICD-10-CM

## 2024-05-23 DIAGNOSIS — J44.9 CHRONIC OBSTRUCTIVE PULMONARY DISEASE, UNSPECIFIED COPD TYPE (HCC): Primary | ICD-10-CM

## 2024-05-23 PROCEDURE — 1123F ACP DISCUSS/DSCN MKR DOCD: CPT | Performed by: INTERNAL MEDICINE

## 2024-05-23 PROCEDURE — 3074F SYST BP LT 130 MM HG: CPT | Performed by: INTERNAL MEDICINE

## 2024-05-23 PROCEDURE — 99214 OFFICE O/P EST MOD 30 MIN: CPT | Performed by: INTERNAL MEDICINE

## 2024-05-23 PROCEDURE — 3078F DIAST BP <80 MM HG: CPT | Performed by: INTERNAL MEDICINE

## 2024-08-04 DIAGNOSIS — I10 ESSENTIAL HYPERTENSION: ICD-10-CM

## 2024-08-04 DIAGNOSIS — I42.9 CARDIOMYOPATHY, UNSPECIFIED TYPE (HCC): ICD-10-CM

## 2024-08-04 DIAGNOSIS — I48.91 ATRIAL FIBRILLATION WITH RVR (HCC): ICD-10-CM

## 2024-08-05 RX ORDER — METOPROLOL SUCCINATE 200 MG/1
200 TABLET, EXTENDED RELEASE ORAL DAILY
Qty: 90 TABLET | Refills: 1 | Status: SHIPPED | OUTPATIENT
Start: 2024-08-05

## 2024-08-05 RX ORDER — RIVAROXABAN 20 MG/1
TABLET, FILM COATED ORAL
Qty: 90 TABLET | Refills: 1 | Status: SHIPPED | OUTPATIENT
Start: 2024-08-05

## 2024-08-06 ENCOUNTER — OFFICE VISIT (OUTPATIENT)
Dept: CARDIOLOGY CLINIC | Age: 69
End: 2024-08-06
Payer: MEDICARE

## 2024-08-06 VITALS
WEIGHT: 231.92 LBS | HEART RATE: 93 BPM | HEIGHT: 69 IN | BODY MASS INDEX: 34.35 KG/M2 | OXYGEN SATURATION: 89 % | SYSTOLIC BLOOD PRESSURE: 118 MMHG | DIASTOLIC BLOOD PRESSURE: 48 MMHG

## 2024-08-06 DIAGNOSIS — I27.81 COR PULMONALE (CHRONIC) (HCC): ICD-10-CM

## 2024-08-06 DIAGNOSIS — I42.9 CARDIOMYOPATHY, UNSPECIFIED TYPE (HCC): Primary | Chronic | ICD-10-CM

## 2024-08-06 DIAGNOSIS — I10 ESSENTIAL HYPERTENSION: Chronic | ICD-10-CM

## 2024-08-06 DIAGNOSIS — I48.0 PAROXYSMAL ATRIAL FIBRILLATION (HCC): ICD-10-CM

## 2024-08-06 PROCEDURE — 3078F DIAST BP <80 MM HG: CPT | Performed by: INTERNAL MEDICINE

## 2024-08-06 PROCEDURE — 93000 ELECTROCARDIOGRAM COMPLETE: CPT | Performed by: INTERNAL MEDICINE

## 2024-08-06 PROCEDURE — 3074F SYST BP LT 130 MM HG: CPT | Performed by: INTERNAL MEDICINE

## 2024-08-06 PROCEDURE — 1123F ACP DISCUSS/DSCN MKR DOCD: CPT | Performed by: INTERNAL MEDICINE

## 2024-08-06 PROCEDURE — 99214 OFFICE O/P EST MOD 30 MIN: CPT | Performed by: INTERNAL MEDICINE

## 2024-08-06 NOTE — PATIENT INSTRUCTIONS
PLAN:  Important to remember to take the Entresto twice daily.  This is important for your heart.   No change in medications today  No cardiac testing warranted at this time   I Strongly advised complete smoking cessation. Resources given to assist quitting including the followin-800-QUIT NOW.

## 2024-08-06 NOTE — PROGRESS NOTES
scar.  Low risk study       Impression and Plan        Chronic congestive heart failure with recovered LVEF  RV dilation/cor pulmonale  -moderate LV dysfunction with recovery of LVEF to 55% by most recent Echo   -Compensated by exam today , stable dyspnea with activity     Coronary artery disease by CT    -no angina by history    -Normal EF   -Forgetting evening pills including statin often    Atrial fibrillation, paroxysmal, maintaining sinus by exam  -Mild proximal ascending aortic enlargement - 3.8 by CT 6/2022    COPD, severe, with chronic hypoxic respiratory failure, 2.5 L nasal cannula  PA enlargement by CT  Pulmonary hypertension-moderate, mixed picture group 2/3  Right upper lobe pulmonary nodule follows with pulmonary    -transitioning care to Dr. Will with Dr. Watts transitioning to sleep medicine     The 10-year ASCVD risk score (Brian DK, et al., 2019) is: 24.5%    Values used to calculate the score:      Age: 69 years      Sex: Male      Is Non- : No      Diabetic: No      Tobacco smoker: Yes      Systolic Blood Pressure: 118 mmHg      Is BP treated: Yes      HDL Cholesterol: 35 mg/dL      Total Cholesterol: 222 mg/dL    [id number] Score for Atrial Fibrillation Stroke Risk   Risk   Factors  Component Value   C CHF No 0   H HTN Yes 1   A2 Age >= 75 No,  (69 y.o.) 0   D DM No 0   S2 Prior Stroke/TIA No 0   V Vascular Disease No 0   A Age 65-74 Yes,  (69 y.o.) 1   Sc Sex male 0    [id number]  Score  2   Score last updated 6/8/23 10:22 PM EDT    Click here for a link to the UpToDate guideline \"Atrial Fibrillation: Anticoagulation therapy to prevent embolization    Disclaimer: Risk Score calculation is dependent on accuracy of patient problem list and past encounter diagnosis.    Patient Active Problem List   Diagnosis    Chronic hypoxemic respiratory failure (HCC)    Atrial fibrillation with RVR (HCC)    Cardiomyopathy (HCC)    Biventricular heart failure (HCC)    Essential

## 2024-09-25 ENCOUNTER — TELEPHONE (OUTPATIENT)
Dept: PULMONOLOGY | Age: 69
End: 2024-09-25

## 2024-09-25 DIAGNOSIS — J44.9 CHRONIC OBSTRUCTIVE PULMONARY DISEASE, UNSPECIFIED COPD TYPE (HCC): ICD-10-CM

## 2024-09-25 RX ORDER — FLUTICASONE PROPIONATE 50 MCG
2 SPRAY, SUSPENSION (ML) NASAL DAILY
Qty: 16 G | Refills: 5 | Status: SHIPPED | OUTPATIENT
Start: 2024-09-25

## 2024-09-25 RX ORDER — ALBUTEROL SULFATE 90 UG/1
2 INHALANT RESPIRATORY (INHALATION) EVERY 4 HOURS PRN
Qty: 18 G | Refills: 5 | Status: SHIPPED | OUTPATIENT
Start: 2024-09-25

## 2024-09-29 DIAGNOSIS — I10 ESSENTIAL HYPERTENSION: ICD-10-CM

## 2024-09-30 NOTE — TELEPHONE ENCOUNTER
Last OV 8/6/24  No upcoming OV  BMP 3/1/22      Attempted to call pt, no answer. LMOVM for pt to return call back. Pt needs updated lab work, labs already ordered in chart.

## 2024-10-01 RX ORDER — FUROSEMIDE 20 MG
20 TABLET ORAL DAILY
Qty: 90 TABLET | Refills: 0 | OUTPATIENT
Start: 2024-10-01

## 2024-10-01 NOTE — TELEPHONE ENCOUNTER
Called and spoke to daniel Cagle per HIPAA. Tsering will inform pt he needs blood work completed.

## 2024-10-01 NOTE — TELEPHONE ENCOUNTER
This was just filled on 9/9/24.  Does not need refills.  Pharmacy stating refill will be placed on file.  No refills sent at this time since patient needs blood work and has medication at present time.

## 2024-12-16 DIAGNOSIS — I10 ESSENTIAL HYPERTENSION: ICD-10-CM

## 2024-12-16 RX ORDER — FUROSEMIDE 20 MG/1
20 TABLET ORAL DAILY
Qty: 30 TABLET | Refills: 0 | Status: SHIPPED | OUTPATIENT
Start: 2024-12-16

## 2024-12-16 NOTE — TELEPHONE ENCOUNTER
12/16 Called [phone]. LVM advising pt appt needed with RJM or ADRIANE for 6mos fu (2/2025) for medication refill

## 2024-12-16 NOTE — TELEPHONE ENCOUNTER
Evaluated in August and doing well. He should indeed be scheduled in feb 2025 for follow up. Bigger issue is labs. Needs labs ordered previously by Ariella to ensure lasix is not causing harm with electrolyte balance and kidney function. Will write for 30 days. Needs labs done prior to further refills. Pls let the patient know to have them done. TY

## 2024-12-16 NOTE — TELEPHONE ENCOUNTER
LOV 8/6/24  Needs follow up in February with DE NP or DARYL    Lab Results   Component Value Date/Time     03/01/2022 02:05 PM    K 4.3 03/01/2022 02:05 PM    K 4.2 09/23/2021 06:14 AM    CL 98 03/01/2022 02:05 PM    CO2 26 03/01/2022 02:05 PM    BUN 11 03/01/2022 02:05 PM    CREATININE 1.0 03/01/2022 02:05 PM    GLUCOSE 115 03/01/2022 02:05 PM    CALCIUM 9.3 03/01/2022 02:05 PM    LABGLOM >60 03/01/2022 02:05 PM

## 2024-12-18 NOTE — TELEPHONE ENCOUNTER
Spoke with Tsering per HIPAA and relayed [facility] message. Pt was called by NESTOR Engle yesterday and this message was relayed and appt made for 2/27/25

## 2025-02-01 ENCOUNTER — APPOINTMENT (OUTPATIENT)
Dept: CT IMAGING | Age: 70
End: 2025-02-01
Payer: MEDICARE

## 2025-02-01 ENCOUNTER — HOSPITAL ENCOUNTER (INPATIENT)
Age: 70
LOS: 7 days | Discharge: HOME | End: 2025-02-08
Attending: STUDENT IN AN ORGANIZED HEALTH CARE EDUCATION/TRAINING PROGRAM | Admitting: PEDIATRICS
Payer: MEDICARE

## 2025-02-01 ENCOUNTER — APPOINTMENT (OUTPATIENT)
Dept: GENERAL RADIOLOGY | Age: 70
End: 2025-02-01
Payer: MEDICARE

## 2025-02-01 DIAGNOSIS — J10.1 INFLUENZA A: Primary | ICD-10-CM

## 2025-02-01 DIAGNOSIS — R79.89 ELEVATED TROPONIN: ICD-10-CM

## 2025-02-01 DIAGNOSIS — I48.91 ATRIAL FIBRILLATION WITH RVR (HCC): ICD-10-CM

## 2025-02-01 LAB
ALBUMIN SERPL-MCNC: 3.5 G/DL (ref 3.4–5)
ALBUMIN/GLOB SERPL: 0.9 {RATIO} (ref 1.1–2.2)
ALP SERPL-CCNC: 74 U/L (ref 40–129)
ALT SERPL-CCNC: 18 U/L (ref 10–40)
ANION GAP SERPL CALCULATED.3IONS-SCNC: 10 MMOL/L (ref 3–16)
AST SERPL-CCNC: 38 U/L (ref 15–37)
BASE EXCESS BLDV CALC-SCNC: 4 MMOL/L (ref -3–3)
BASOPHILS # BLD: 0 K/UL (ref 0–0.2)
BASOPHILS NFR BLD: 0.3 %
BILIRUB SERPL-MCNC: 0.4 MG/DL (ref 0–1)
BUN SERPL-MCNC: 10 MG/DL (ref 7–20)
CALCIUM SERPL-MCNC: 8.4 MG/DL (ref 8.3–10.6)
CHLORIDE SERPL-SCNC: 95 MMOL/L (ref 99–110)
CO2 BLDV-SCNC: 36 MMOL/L
CO2 SERPL-SCNC: 33 MMOL/L (ref 21–32)
COHGB MFR BLDV: 2 % (ref 0–1.5)
CREAT SERPL-MCNC: 0.9 MG/DL (ref 0.8–1.3)
D-DIMER QUANTITATIVE: 1.24 UG/ML FEU (ref 0–0.6)
DEPRECATED RDW RBC AUTO: 13.4 % (ref 12.4–15.4)
EOSINOPHIL # BLD: 0 K/UL (ref 0–0.6)
EOSINOPHIL NFR BLD: 0.1 %
FLUAV RNA RESP QL NAA+PROBE: DETECTED
FLUBV RNA RESP QL NAA+PROBE: NOT DETECTED
GFR SERPLBLD CREATININE-BSD FMLA CKD-EPI: >90 ML/MIN/{1.73_M2}
GLUCOSE BLD-MCNC: 106 MG/DL (ref 70–99)
GLUCOSE SERPL-MCNC: 106 MG/DL (ref 70–99)
HCO3 BLDV-SCNC: 33.9 MMOL/L (ref 23–29)
HCT VFR BLD AUTO: 43.3 % (ref 40.5–52.5)
HGB BLD-MCNC: 14.6 G/DL (ref 13.5–17.5)
LACTATE BLDV-SCNC: 1.3 MMOL/L (ref 0.4–1.9)
LACTATE BLDV-SCNC: 2.4 MMOL/L (ref 0.4–1.9)
LYMPHOCYTES # BLD: 1.1 K/UL (ref 1–5.1)
LYMPHOCYTES NFR BLD: 13.4 %
MAGNESIUM SERPL-MCNC: 1.25 MG/DL (ref 1.8–2.4)
MCH RBC QN AUTO: 32.1 PG (ref 26–34)
MCHC RBC AUTO-ENTMCNC: 33.7 G/DL (ref 31–36)
MCV RBC AUTO: 95.3 FL (ref 80–100)
METHGB MFR BLDV: 0.3 %
MONOCYTES # BLD: 1.2 K/UL (ref 0–1.3)
MONOCYTES NFR BLD: 14.8 %
NEUTROPHILS # BLD: 5.9 K/UL (ref 1.7–7.7)
NEUTROPHILS NFR BLD: 71.4 %
NT-PROBNP SERPL-MCNC: 4368 PG/ML (ref 0–124)
O2 THERAPY: ABNORMAL
PCO2 BLDV: 75.9 MMHG (ref 40–50)
PERFORMED ON: ABNORMAL
PH BLDV: 7.27 [PH] (ref 7.35–7.45)
PLATELET # BLD AUTO: 187 K/UL (ref 135–450)
PMV BLD AUTO: 9.5 FL (ref 5–10.5)
PO2 BLDV: 32.3 MMHG (ref 25–40)
POTASSIUM SERPL-SCNC: 4.9 MMOL/L (ref 3.5–5.1)
PROT SERPL-MCNC: 7.4 G/DL (ref 6.4–8.2)
RBC # BLD AUTO: 4.54 M/UL (ref 4.2–5.9)
RSV AG NOSE QL: NEGATIVE
SAO2 % BLDV: 52 %
SARS-COV-2 RNA RESP QL NAA+PROBE: NOT DETECTED
SODIUM SERPL-SCNC: 138 MMOL/L (ref 136–145)
TROPONIN, HIGH SENSITIVITY: 40 NG/L (ref 0–22)
TROPONIN, HIGH SENSITIVITY: 57 NG/L (ref 0–22)
WBC # BLD AUTO: 8.2 K/UL (ref 4–11)

## 2025-02-01 PROCEDURE — 83605 ASSAY OF LACTIC ACID: CPT

## 2025-02-01 PROCEDURE — 96365 THER/PROPH/DIAG IV INF INIT: CPT

## 2025-02-01 PROCEDURE — 6360000004 HC RX CONTRAST MEDICATION: Performed by: STUDENT IN AN ORGANIZED HEALTH CARE EDUCATION/TRAINING PROGRAM

## 2025-02-01 PROCEDURE — 93005 ELECTROCARDIOGRAM TRACING: CPT | Performed by: STUDENT IN AN ORGANIZED HEALTH CARE EDUCATION/TRAINING PROGRAM

## 2025-02-01 PROCEDURE — 6370000000 HC RX 637 (ALT 250 FOR IP): Performed by: STUDENT IN AN ORGANIZED HEALTH CARE EDUCATION/TRAINING PROGRAM

## 2025-02-01 PROCEDURE — 85025 COMPLETE CBC W/AUTO DIFF WBC: CPT

## 2025-02-01 PROCEDURE — 36415 COLL VENOUS BLD VENIPUNCTURE: CPT

## 2025-02-01 PROCEDURE — 71045 X-RAY EXAM CHEST 1 VIEW: CPT

## 2025-02-01 PROCEDURE — 83880 ASSAY OF NATRIURETIC PEPTIDE: CPT

## 2025-02-01 PROCEDURE — 96375 TX/PRO/DX INJ NEW DRUG ADDON: CPT

## 2025-02-01 PROCEDURE — 87807 RSV ASSAY W/OPTIC: CPT

## 2025-02-01 PROCEDURE — 2060000000 HC ICU INTERMEDIATE R&B

## 2025-02-01 PROCEDURE — 84484 ASSAY OF TROPONIN QUANT: CPT

## 2025-02-01 PROCEDURE — 85379 FIBRIN DEGRADATION QUANT: CPT

## 2025-02-01 PROCEDURE — 71260 CT THORAX DX C+: CPT

## 2025-02-01 PROCEDURE — 82803 BLOOD GASES ANY COMBINATION: CPT

## 2025-02-01 PROCEDURE — 83735 ASSAY OF MAGNESIUM: CPT

## 2025-02-01 PROCEDURE — 99285 EMERGENCY DEPT VISIT HI MDM: CPT

## 2025-02-01 PROCEDURE — 2500000003 HC RX 250 WO HCPCS: Performed by: STUDENT IN AN ORGANIZED HEALTH CARE EDUCATION/TRAINING PROGRAM

## 2025-02-01 PROCEDURE — 87636 SARSCOV2 & INF A&B AMP PRB: CPT

## 2025-02-01 PROCEDURE — 2700000000 HC OXYGEN THERAPY PER DAY

## 2025-02-01 PROCEDURE — 94761 N-INVAS EAR/PLS OXIMETRY MLT: CPT

## 2025-02-01 PROCEDURE — 87040 BLOOD CULTURE FOR BACTERIA: CPT

## 2025-02-01 PROCEDURE — 80053 COMPREHEN METABOLIC PANEL: CPT

## 2025-02-01 PROCEDURE — 6360000002 HC RX W HCPCS: Performed by: STUDENT IN AN ORGANIZED HEALTH CARE EDUCATION/TRAINING PROGRAM

## 2025-02-01 RX ORDER — PROCHLORPERAZINE MALEATE 10 MG
10 TABLET ORAL EVERY 8 HOURS PRN
Status: DISCONTINUED | OUTPATIENT
Start: 2025-02-01 | End: 2025-02-08 | Stop reason: HOSPADM

## 2025-02-01 RX ORDER — SODIUM CHLORIDE 0.9 % (FLUSH) 0.9 %
5-40 SYRINGE (ML) INJECTION EVERY 12 HOURS SCHEDULED
Status: DISCONTINUED | OUTPATIENT
Start: 2025-02-02 | End: 2025-02-01 | Stop reason: SDUPTHER

## 2025-02-01 RX ORDER — ENOXAPARIN SODIUM 100 MG/ML
30 INJECTION SUBCUTANEOUS 2 TIMES DAILY
Status: DISCONTINUED | OUTPATIENT
Start: 2025-02-01 | End: 2025-02-01

## 2025-02-01 RX ORDER — SODIUM CHLORIDE 9 MG/ML
INJECTION, SOLUTION INTRAVENOUS PRN
Status: DISCONTINUED | OUTPATIENT
Start: 2025-02-01 | End: 2025-02-08 | Stop reason: HOSPADM

## 2025-02-01 RX ORDER — ONDANSETRON 4 MG/1
4 TABLET, ORALLY DISINTEGRATING ORAL EVERY 8 HOURS PRN
Status: DISCONTINUED | OUTPATIENT
Start: 2025-02-01 | End: 2025-02-01

## 2025-02-01 RX ORDER — LORAZEPAM 2 MG/1
4 TABLET ORAL
Status: DISCONTINUED | OUTPATIENT
Start: 2025-02-01 | End: 2025-02-02 | Stop reason: ALTCHOICE

## 2025-02-01 RX ORDER — LORAZEPAM 2 MG/ML
3 INJECTION INTRAMUSCULAR
Status: DISCONTINUED | OUTPATIENT
Start: 2025-02-01 | End: 2025-02-02 | Stop reason: ALTCHOICE

## 2025-02-01 RX ORDER — ONDANSETRON 2 MG/ML
4 INJECTION INTRAMUSCULAR; INTRAVENOUS EVERY 6 HOURS PRN
Status: DISCONTINUED | OUTPATIENT
Start: 2025-02-01 | End: 2025-02-01

## 2025-02-01 RX ORDER — MAGNESIUM SULFATE IN WATER 40 MG/ML
2000 INJECTION, SOLUTION INTRAVENOUS PRN
Status: DISCONTINUED | OUTPATIENT
Start: 2025-02-01 | End: 2025-02-08 | Stop reason: HOSPADM

## 2025-02-01 RX ORDER — IPRATROPIUM BROMIDE AND ALBUTEROL SULFATE 2.5; .5 MG/3ML; MG/3ML
1 SOLUTION RESPIRATORY (INHALATION) ONCE
Status: COMPLETED | OUTPATIENT
Start: 2025-02-01 | End: 2025-02-01

## 2025-02-01 RX ORDER — NICOTINE 21 MG/24HR
1 PATCH, TRANSDERMAL 24 HOURS TRANSDERMAL DAILY
Status: DISCONTINUED | OUTPATIENT
Start: 2025-02-02 | End: 2025-02-08 | Stop reason: HOSPADM

## 2025-02-01 RX ORDER — LORAZEPAM 2 MG/ML
1 INJECTION INTRAMUSCULAR
Status: DISCONTINUED | OUTPATIENT
Start: 2025-02-01 | End: 2025-02-02 | Stop reason: ALTCHOICE

## 2025-02-01 RX ORDER — LORAZEPAM 2 MG/ML
2 INJECTION INTRAMUSCULAR
Status: DISCONTINUED | OUTPATIENT
Start: 2025-02-01 | End: 2025-02-02 | Stop reason: ALTCHOICE

## 2025-02-01 RX ORDER — POLYETHYLENE GLYCOL 3350 17 G/17G
17 POWDER, FOR SOLUTION ORAL DAILY PRN
Status: DISCONTINUED | OUTPATIENT
Start: 2025-02-01 | End: 2025-02-08 | Stop reason: HOSPADM

## 2025-02-01 RX ORDER — FOLIC ACID 1 MG/1
1 TABLET ORAL DAILY
Status: DISCONTINUED | OUTPATIENT
Start: 2025-02-02 | End: 2025-02-08 | Stop reason: HOSPADM

## 2025-02-01 RX ORDER — MAGNESIUM SULFATE IN WATER 40 MG/ML
4000 INJECTION, SOLUTION INTRAVENOUS ONCE
Status: COMPLETED | OUTPATIENT
Start: 2025-02-01 | End: 2025-02-02

## 2025-02-01 RX ORDER — SODIUM CHLORIDE 0.9 % (FLUSH) 0.9 %
5-40 SYRINGE (ML) INJECTION PRN
Status: DISCONTINUED | OUTPATIENT
Start: 2025-02-01 | End: 2025-02-01 | Stop reason: SDUPTHER

## 2025-02-01 RX ORDER — SODIUM CHLORIDE 9 MG/ML
INJECTION, SOLUTION INTRAVENOUS PRN
Status: DISCONTINUED | OUTPATIENT
Start: 2025-02-01 | End: 2025-02-01 | Stop reason: SDUPTHER

## 2025-02-01 RX ORDER — SODIUM CHLORIDE 0.9 % (FLUSH) 0.9 %
5-40 SYRINGE (ML) INJECTION EVERY 12 HOURS SCHEDULED
Status: DISCONTINUED | OUTPATIENT
Start: 2025-02-01 | End: 2025-02-08 | Stop reason: HOSPADM

## 2025-02-01 RX ORDER — LORAZEPAM 2 MG/1
2 TABLET ORAL
Status: DISCONTINUED | OUTPATIENT
Start: 2025-02-01 | End: 2025-02-02 | Stop reason: ALTCHOICE

## 2025-02-01 RX ORDER — LORAZEPAM 2 MG/ML
4 INJECTION INTRAMUSCULAR
Status: DISCONTINUED | OUTPATIENT
Start: 2025-02-01 | End: 2025-02-02 | Stop reason: ALTCHOICE

## 2025-02-01 RX ORDER — MULTIVITAMIN WITH IRON
1 TABLET ORAL DAILY
Status: DISCONTINUED | OUTPATIENT
Start: 2025-02-02 | End: 2025-02-08 | Stop reason: HOSPADM

## 2025-02-01 RX ORDER — ACETAMINOPHEN 325 MG/1
650 TABLET ORAL EVERY 6 HOURS PRN
Status: DISCONTINUED | OUTPATIENT
Start: 2025-02-01 | End: 2025-02-08 | Stop reason: HOSPADM

## 2025-02-01 RX ORDER — POTASSIUM CHLORIDE 1500 MG/1
40 TABLET, EXTENDED RELEASE ORAL PRN
Status: DISCONTINUED | OUTPATIENT
Start: 2025-02-01 | End: 2025-02-08 | Stop reason: HOSPADM

## 2025-02-01 RX ORDER — LORAZEPAM 1 MG/1
1 TABLET ORAL
Status: DISCONTINUED | OUTPATIENT
Start: 2025-02-01 | End: 2025-02-02 | Stop reason: ALTCHOICE

## 2025-02-01 RX ORDER — POTASSIUM CHLORIDE 7.45 MG/ML
10 INJECTION INTRAVENOUS PRN
Status: DISCONTINUED | OUTPATIENT
Start: 2025-02-01 | End: 2025-02-08 | Stop reason: HOSPADM

## 2025-02-01 RX ORDER — LANOLIN ALCOHOL/MO/W.PET/CERES
100 CREAM (GRAM) TOPICAL DAILY
Status: DISCONTINUED | OUTPATIENT
Start: 2025-02-02 | End: 2025-02-08 | Stop reason: HOSPADM

## 2025-02-01 RX ORDER — OSELTAMIVIR PHOSPHATE 75 MG/1
75 CAPSULE ORAL 2 TIMES DAILY
Status: DISCONTINUED | OUTPATIENT
Start: 2025-02-02 | End: 2025-02-02 | Stop reason: SDUPTHER

## 2025-02-01 RX ORDER — ACETAMINOPHEN 650 MG/1
650 SUPPOSITORY RECTAL EVERY 6 HOURS PRN
Status: DISCONTINUED | OUTPATIENT
Start: 2025-02-01 | End: 2025-02-08 | Stop reason: HOSPADM

## 2025-02-01 RX ORDER — SODIUM CHLORIDE 0.9 % (FLUSH) 0.9 %
5-40 SYRINGE (ML) INJECTION PRN
Status: DISCONTINUED | OUTPATIENT
Start: 2025-02-01 | End: 2025-02-08 | Stop reason: HOSPADM

## 2025-02-01 RX ORDER — PROCHLORPERAZINE EDISYLATE 5 MG/ML
10 INJECTION INTRAMUSCULAR; INTRAVENOUS EVERY 6 HOURS PRN
Status: DISCONTINUED | OUTPATIENT
Start: 2025-02-01 | End: 2025-02-08 | Stop reason: HOSPADM

## 2025-02-01 RX ORDER — IOPAMIDOL 755 MG/ML
75 INJECTION, SOLUTION INTRAVASCULAR
Status: COMPLETED | OUTPATIENT
Start: 2025-02-01 | End: 2025-02-01

## 2025-02-01 RX ADMIN — IOPAMIDOL 75 ML: 755 INJECTION, SOLUTION INTRAVENOUS at 19:39

## 2025-02-01 RX ADMIN — MAGNESIUM SULFATE HEPTAHYDRATE 4000 MG: 40 INJECTION, SOLUTION INTRAVENOUS at 20:09

## 2025-02-01 RX ADMIN — METHYLPREDNISOLONE SODIUM SUCCINATE 125 MG: 125 INJECTION INTRAMUSCULAR; INTRAVENOUS at 18:31

## 2025-02-01 RX ADMIN — IPRATROPIUM BROMIDE AND ALBUTEROL SULFATE 1 DOSE: 2.5; .5 SOLUTION RESPIRATORY (INHALATION) at 18:28

## 2025-02-01 ASSESSMENT — LIFESTYLE VARIABLES
HOW OFTEN DO YOU HAVE A DRINK CONTAINING ALCOHOL: 4 OR MORE TIMES A WEEK
HOW OFTEN DO YOU HAVE A DRINK CONTAINING ALCOHOL: 2-3 TIMES A WEEK
HOW MANY STANDARD DRINKS CONTAINING ALCOHOL DO YOU HAVE ON A TYPICAL DAY: 3 OR 4

## 2025-02-01 ASSESSMENT — PAIN - FUNCTIONAL ASSESSMENT: PAIN_FUNCTIONAL_ASSESSMENT: NONE - DENIES PAIN

## 2025-02-01 NOTE — ED PROVIDER NOTES
[facility] EMERGENCY DEPARTMENT     EMERGENCY DEPARTMENT ENCOUNTER            Pt Name: Severino Lozano   MRN: [id number]   Birthdate 1955   Date of evaluation: 2/1/2025   Provider: Brandi Castillo MD   PCP: Amanda Rosales APRN - NP   Note Started: 5:52 PM EST 2/1/25          CHIEF COMPLAINT     Chief Complaint   Patient presents with    Shortness of Breath     Patient wears 2L O2 at baseline and was not connected when EMS arrived. SpO2 in the 70's. Family has the flu. -160's.      HISTORY OF PRESENT ILLNESS:   History from : Patient and Family (sister-in-law)    Limitations to history : None     Severino Lozano is a 70 y.o. male who presents complaining of 2-day history of cough, shortness of breath, generalized malaise.  Patient's family member states that she cares for him normally and went to check on him earlier and found that he was having difficulty breathing.  He normally wears 2 L of oxygen at home.  He states that he feels severely short of breath.  Denies pain.  Multiple sick contacts at home.  Did have some left hand swelling a few days ago and has intermittent bilateral lower extremity swelling.  They deny any other complaints or concerns.  Reports that he has been compliant with his medications.    Nursing Notes were all reviewed and agreed with, or any disagreements were addressed in the HPI.     REVIEW OF SYSTEMS :    Positives and Pertinent negatives as per HPI.      MEDICAL HISTORY   has a past medical history of CAD (coronary artery disease), Cardiomyopathy ([facility]), CHF (congestive heart failure) ([facility]), COPD (chronic obstructive pulmonary disease) ([facility]), PAF (paroxysmal atrial fibrillation) ([facility]), and Pulmonary nodules.    History reviewed. No pertinent surgical history.   CURRENTMEDICATIONS       Previous Medications    ALBUTEROL SULFATE HFA (PROVENTIL;VENTOLIN;PROAIR) 108 (90 BASE) MCG/ACT INHALER    Inhale 2 puffs into the lungs every 4 hours as needed for  to severe sepsis or septic shock?   No   Exclusion criteria - the patient is NOT to be included for SEP-1 Core Measure due to:  Viral etiology found or highly suspected (including COVID-19) without concomitant bacterial infection       CC/HPI Summary, DDx, ED Course, and Reassessment: Patient is a 70-year-old male, presenting with concerns for difficulty breathing, respiratory distress.  He is normally on 2 L of oxygen at baseline and has not been feeling well, has been feeling extreme shortness of breath for the past day or so.  Family called EMS when they found him to be struggling to breathe.  When EMS arrived, he was not connected to his oxygen was placed back on his 2 L with improvement of his saturations.  Upon arrival in the ED, he is tachycardic and tachypneic.  Diminished breath sounds bilaterally.  Was treated with breathing treatment and steroids.  He was found to be in A-fib with RVR, however rate was around 120 or lower so was not started on rate control medication.  He was found to be hypomagnesemic and was given IV magnesium repletion.  He is flu a positive.  His troponin was initially elevated at 40, uptrending to 57 which I felt was likely to be secondary to demand ischemia.  I did consult cardiology and spoke to Dr. Escamilla to review the patient's case and he is in agreement that this is likely secondary due to demand ischemia and patient is appropriate to stay at [facility].  Patient will be hospitalized for further workup and treatment of his condition.  He is comfortable and in agreement with plan of care.       Patient was given the following medications:   Medications   magnesium sulfate 4000 mg in 100 mL IVPB premix (4,000 mg IntraVENous New Bag 2/1/25 2009)   ipratropium 0.5 mg-albuterol 2.5 mg (DUONEB) nebulizer solution 1 Dose (1 Dose Inhalation Given 2/1/25 1828)   methylPREDNISolone sodium succ (SOLU-MEDROL) 125 mg in sterile water 2 mL injection (125 mg IntraVENous Given 2/1/25 1831)

## 2025-02-02 LAB
ANION GAP SERPL CALCULATED.3IONS-SCNC: 8 MMOL/L (ref 3–16)
BASOPHILS # BLD: 0 K/UL (ref 0–0.2)
BASOPHILS NFR BLD: 0.1 %
BUN SERPL-MCNC: 13 MG/DL (ref 7–20)
CALCIUM SERPL-MCNC: 8.4 MG/DL (ref 8.3–10.6)
CHLORIDE SERPL-SCNC: 96 MMOL/L (ref 99–110)
CO2 SERPL-SCNC: 33 MMOL/L (ref 21–32)
CREAT SERPL-MCNC: 0.8 MG/DL (ref 0.8–1.3)
DEPRECATED RDW RBC AUTO: 13.5 % (ref 12.4–15.4)
EKG DIAGNOSIS: NORMAL
EKG Q-T INTERVAL: 342 MS
EKG QRS DURATION: 88 MS
EKG QTC CALCULATION (BAZETT): 536 MS
EKG R AXIS: 268 DEGREES
EKG T AXIS: 102 DEGREES
EKG VENTRICULAR RATE: 148 BPM
EOSINOPHIL # BLD: 0 K/UL (ref 0–0.6)
EOSINOPHIL NFR BLD: 0.1 %
GFR SERPLBLD CREATININE-BSD FMLA CKD-EPI: >90 ML/MIN/{1.73_M2}
GLUCOSE BLD-MCNC: 161 MG/DL (ref 70–99)
GLUCOSE SERPL-MCNC: 149 MG/DL (ref 70–99)
HCT VFR BLD AUTO: 44.4 % (ref 40.5–52.5)
HGB BLD-MCNC: 14.9 G/DL (ref 13.5–17.5)
LYMPHOCYTES # BLD: 0.6 K/UL (ref 1–5.1)
LYMPHOCYTES NFR BLD: 13.2 %
MCH RBC QN AUTO: 31.9 PG (ref 26–34)
MCHC RBC AUTO-ENTMCNC: 33.5 G/DL (ref 31–36)
MCV RBC AUTO: 95.2 FL (ref 80–100)
MONOCYTES # BLD: 0.2 K/UL (ref 0–1.3)
MONOCYTES NFR BLD: 4.4 %
NEUTROPHILS # BLD: 3.7 K/UL (ref 1.7–7.7)
NEUTROPHILS NFR BLD: 82.2 %
PERFORMED ON: ABNORMAL
PLATELET # BLD AUTO: 165 K/UL (ref 135–450)
PMV BLD AUTO: 8.9 FL (ref 5–10.5)
POTASSIUM SERPL-SCNC: 5.1 MMOL/L (ref 3.5–5.1)
RBC # BLD AUTO: 4.67 M/UL (ref 4.2–5.9)
SODIUM SERPL-SCNC: 137 MMOL/L (ref 136–145)
TROPONIN, HIGH SENSITIVITY: 41 NG/L (ref 0–22)
WBC # BLD AUTO: 4.5 K/UL (ref 4–11)

## 2025-02-02 PROCEDURE — 6370000000 HC RX 637 (ALT 250 FOR IP): Performed by: INTERNAL MEDICINE

## 2025-02-02 PROCEDURE — 93005 ELECTROCARDIOGRAM TRACING: CPT | Performed by: PEDIATRICS

## 2025-02-02 PROCEDURE — 6370000000 HC RX 637 (ALT 250 FOR IP): Performed by: PEDIATRICS

## 2025-02-02 PROCEDURE — 84484 ASSAY OF TROPONIN QUANT: CPT

## 2025-02-02 PROCEDURE — 93010 ELECTROCARDIOGRAM REPORT: CPT | Performed by: INTERNAL MEDICINE

## 2025-02-02 PROCEDURE — 80048 BASIC METABOLIC PNL TOTAL CA: CPT

## 2025-02-02 PROCEDURE — 36415 COLL VENOUS BLD VENIPUNCTURE: CPT

## 2025-02-02 PROCEDURE — 94761 N-INVAS EAR/PLS OXIMETRY MLT: CPT

## 2025-02-02 PROCEDURE — 2060000000 HC ICU INTERMEDIATE R&B

## 2025-02-02 PROCEDURE — 2700000000 HC OXYGEN THERAPY PER DAY

## 2025-02-02 PROCEDURE — 85025 COMPLETE CBC W/AUTO DIFF WBC: CPT

## 2025-02-02 PROCEDURE — 94640 AIRWAY INHALATION TREATMENT: CPT

## 2025-02-02 PROCEDURE — 2500000003 HC RX 250 WO HCPCS: Performed by: PEDIATRICS

## 2025-02-02 PROCEDURE — 99222 1ST HOSP IP/OBS MODERATE 55: CPT | Performed by: INTERNAL MEDICINE

## 2025-02-02 RX ORDER — ATORVASTATIN CALCIUM 40 MG/1
40 TABLET, FILM COATED ORAL NIGHTLY
Status: DISCONTINUED | OUTPATIENT
Start: 2025-02-02 | End: 2025-02-08 | Stop reason: HOSPADM

## 2025-02-02 RX ORDER — ALBUTEROL SULFATE 90 UG/1
2 INHALANT RESPIRATORY (INHALATION) 4 TIMES DAILY PRN
Status: DISCONTINUED | OUTPATIENT
Start: 2025-02-02 | End: 2025-02-05

## 2025-02-02 RX ORDER — FLUTICASONE PROPIONATE 50 MCG
2 SPRAY, SUSPENSION (ML) NASAL DAILY
Status: DISCONTINUED | OUTPATIENT
Start: 2025-02-02 | End: 2025-02-08 | Stop reason: HOSPADM

## 2025-02-02 RX ORDER — M-VIT,TX,IRON,MINS/CALC/FOLIC 27MG-0.4MG
1 TABLET ORAL DAILY
Status: DISCONTINUED | OUTPATIENT
Start: 2025-02-02 | End: 2025-02-08 | Stop reason: HOSPADM

## 2025-02-02 RX ORDER — BENZONATATE 100 MG/1
200 CAPSULE ORAL 3 TIMES DAILY PRN
Status: DISCONTINUED | OUTPATIENT
Start: 2025-02-02 | End: 2025-02-08 | Stop reason: HOSPADM

## 2025-02-02 RX ORDER — LORAZEPAM 2 MG/ML
2 CONCENTRATE ORAL
Status: DISCONTINUED | OUTPATIENT
Start: 2025-02-02 | End: 2025-02-05

## 2025-02-02 RX ORDER — LORAZEPAM 2 MG/ML
1 CONCENTRATE ORAL
Status: DISCONTINUED | OUTPATIENT
Start: 2025-02-02 | End: 2025-02-05

## 2025-02-02 RX ORDER — LORAZEPAM 2 MG/ML
3 CONCENTRATE ORAL
Status: DISCONTINUED | OUTPATIENT
Start: 2025-02-02 | End: 2025-02-05

## 2025-02-02 RX ORDER — METOPROLOL SUCCINATE 50 MG/1
200 TABLET, EXTENDED RELEASE ORAL DAILY
Status: DISCONTINUED | OUTPATIENT
Start: 2025-02-02 | End: 2025-02-08 | Stop reason: HOSPADM

## 2025-02-02 RX ORDER — OSELTAMIVIR PHOSPHATE 6 MG/ML
75 FOR SUSPENSION ORAL 2 TIMES DAILY
Status: DISCONTINUED | OUTPATIENT
Start: 2025-02-02 | End: 2025-02-05 | Stop reason: SDUPTHER

## 2025-02-02 RX ORDER — ALBUTEROL SULFATE 90 UG/1
2 INHALANT RESPIRATORY (INHALATION)
Status: DISCONTINUED | OUTPATIENT
Start: 2025-02-02 | End: 2025-02-08 | Stop reason: HOSPADM

## 2025-02-02 RX ORDER — LORAZEPAM 2 MG/1
2 TABLET ORAL
Status: DISCONTINUED | OUTPATIENT
Start: 2025-02-02 | End: 2025-02-05

## 2025-02-02 RX ORDER — LORAZEPAM 1 MG/1
1 TABLET ORAL
Status: DISCONTINUED | OUTPATIENT
Start: 2025-02-02 | End: 2025-02-05

## 2025-02-02 RX ORDER — LORAZEPAM 2 MG/1
4 TABLET ORAL
Status: DISCONTINUED | OUTPATIENT
Start: 2025-02-02 | End: 2025-02-05

## 2025-02-02 RX ORDER — METOPROLOL TARTRATE 1 MG/ML
5 INJECTION, SOLUTION INTRAVENOUS ONCE
Status: COMPLETED | OUTPATIENT
Start: 2025-02-02 | End: 2025-02-02

## 2025-02-02 RX ORDER — LORAZEPAM 2 MG/ML
4 CONCENTRATE ORAL
Status: DISCONTINUED | OUTPATIENT
Start: 2025-02-02 | End: 2025-02-05

## 2025-02-02 RX ADMIN — BENZONATATE 200 MG: 100 CAPSULE ORAL at 12:05

## 2025-02-02 RX ADMIN — Medication 75 MG: at 21:18

## 2025-02-02 RX ADMIN — Medication 10 ML: at 08:45

## 2025-02-02 RX ADMIN — SACUBITRIL AND VALSARTAN 1 TABLET: 24; 26 TABLET, FILM COATED ORAL at 08:42

## 2025-02-02 RX ADMIN — SACUBITRIL AND VALSARTAN 1 TABLET: 24; 26 TABLET, FILM COATED ORAL at 21:21

## 2025-02-02 RX ADMIN — RIVAROXABAN 20 MG: 20 TABLET, FILM COATED ORAL at 08:42

## 2025-02-02 RX ADMIN — Medication 1 TABLET: at 08:43

## 2025-02-02 RX ADMIN — METOPROLOL SUCCINATE 200 MG: 50 TABLET, EXTENDED RELEASE ORAL at 08:43

## 2025-02-02 RX ADMIN — Medication 75 MG: at 00:38

## 2025-02-02 RX ADMIN — Medication 100 MG: at 00:38

## 2025-02-02 RX ADMIN — ALBUTEROL SULFATE 2 PUFF: 90 AEROSOL, METERED RESPIRATORY (INHALATION) at 20:44

## 2025-02-02 RX ADMIN — Medication 10 ML: at 21:21

## 2025-02-02 RX ADMIN — ALBUTEROL SULFATE 2 PUFF: 90 AEROSOL, METERED RESPIRATORY (INHALATION) at 14:09

## 2025-02-02 RX ADMIN — FLUTICASONE PROPIONATE 2 SPRAY: 50 SPRAY, METERED NASAL at 09:28

## 2025-02-02 RX ADMIN — METOROPROLOL TARTRATE 5 MG: 5 INJECTION, SOLUTION INTRAVENOUS at 22:53

## 2025-02-02 RX ADMIN — Medication 10 ML: at 00:38

## 2025-02-02 RX ADMIN — THERA TABS 1 TABLET: TAB at 00:38

## 2025-02-02 RX ADMIN — Medication 75 MG: at 09:28

## 2025-02-02 RX ADMIN — LORAZEPAM 1 MG: 1 TABLET ORAL at 22:10

## 2025-02-02 RX ADMIN — FOLIC ACID 1 MG: 1 TABLET ORAL at 00:38

## 2025-02-02 RX ADMIN — ATORVASTATIN CALCIUM 40 MG: 40 TABLET, FILM COATED ORAL at 21:21

## 2025-02-02 NOTE — CONSULTS
CARDIOLOGY CONSULT    Patient Name: Severino Lozano  Primary Care physician: Amanda Rosales APRN - NP  Reason for Referral/Chief complaint: Severino Lozano  1955 is here to cardiac clinic for follow up of atrial fibrillation and cardiomyopathy.      Subjective:     Severino Lozano is a 70 y.o. with a past medical history notable for permanent atrial fibrillation, congestive heart failure, cardiomyopathy with recovered EF, and COPD with chronic hypoxic respiratory failure.    Prior echocardiogram showed moderate LV dysfunction with EF 35-40 %.  Stress test was negative for inducible ischemia.     In the interm he had an Echocardiogram on 7/14/22 that showed recovery of LV function with EF 55%, mild concentric left ventricular hypertrophy, aortic root mildly dilated.     Echocardiogram EF 55%, D-shaped septum c/w RV pressure and volume overload, aortic valve sclerosis, moderate TR, SPAP 51 mmHg.  We discussed results of his echocardiogram, elevated PA pressures and RV dilation felt secondary to structural lung disease.  Continued on his cardiac medication regimen.    Patient was last evaluated by me in the office in August at which time urged compliance with Entresto and statin.  No significant changes were made.    ED course: Initial heart rate 157, rhythm noted atrial fibrillation rapid ventricular response, blood pressure 193/154.  Noted low-grade fever 100.2 Fahrenheit.  Tested positive for influenza A.  CT showed no PE, dilated pulmonary artery 3.7 cm.  No pericardial effusion.  Labs otherwise notable for high sensitive troponin 40/57, proBNP 4300, venous blood gas with pH 7.2, pCO2 76, magnesium 1.25.    Patient evaluated today. He has been having >1 week symptoms of malaise, weakness, subjective fever and shortness of breath. Notes he just can't get any air at times. No inc edema, orthopnea, paroxysmal nocturnal dyspnea or wt gain. Denies chest pain. Atrial fibrillation noted 8/2024  02/01/2025 06:06 PM       HgBA1c:  Lab Results   Component Value Date    LABA1C 5.4 09/15/2021     Lab Results   Component Value Date    TROPONINI <0.01 09/21/2021     Lab Results   Component Value Date    CHOL 222 (H) 09/23/2021     Lab Results   Component Value Date    TRIG 223 (H) 09/23/2021    TRIG 214 (H) 09/17/2021     Lab Results   Component Value Date    HDL 35 (L) 09/23/2021     No components found for: \"LDLCHOLESTEROL\", \"LDLCALC\"    Lab Results   Component Value Date    VLDL 45 09/23/2021      Latest Reference Range & Units 09/23/21 06:14   Cholesterol, Total 0 - 199 mg/dL 222 (H)   HDL Cholesterol 40 - 60 mg/dL 35 (L)   LDL Cholesterol <100 mg/dL 142 (H)   Triglycerides 0 - 150 mg/dL 223 (H)         Interval Testing/Data:     EKG/tele reviewed as above.     ECHO 4/18/24  Summary   Left ventricular systolic function is normal with ejection fraction   estimated at 55%.   No regional wall motion abnormalities.   D-shaped septum c/w RV pressure and volume overload.   Elevated left ventricular filling pressure.   Mild right-sided chamber enlargement.   Right ventricular systolic function is mildly reduced.   Mild mitral annular calcification.   Aortic valve appears sclerotic but opens adequately.   Moderate tricuspid regurgitation.   Systolic pulmonary artery pressure (SPAP) is estimated at 51 mmHg c/w   moderate pulmonary hypertension (Right atrial pressure of 3 mmHg).     Limited Echo 7/14/22  Summary   Technically difficult examination.   -- Left ventricular systolic function is normal with ejection fraction   estimated at 55%. No regional wall motion abnormalities are noted. There is   mild concentric left ventricular hypertrophy.   -- Right ventricular systolic function is normal.   -- The aortic root is mildly dilated.   There is a moderate localized pericardial effusion near right ventricle and   apex. Possible large fat pad but pericardial space not well seen. There is   no evidence of tamponade.

## 2025-02-02 NOTE — PROGRESS NOTES
Bedside report and transfer of care given to Kary DUNCAN. Pt currently resting in bed with the call light within reach. Pt denies any other care needs at this time. Pt stable at this time.

## 2025-02-02 NOTE — PROGRESS NOTES
Additive QT interval prolongation may occur with ondansetron. Arrhythmias occur most commonly when QTC >500.  Patient's QTC is 536.  Changed ondansetron to Compazine per policy.     Lyric Quesada PharmD, [facility], 2/1/2025 11:27 PM

## 2025-02-02 NOTE — PROGRESS NOTES
Patient admitted to room 327 from ER. Patient oriented to room, call light, bed rails, phone, lights and bathroom. Patient instructed about the schedule of the day including: vital sign frequency, lab draws, possible tests, frequency of MD and staff rounds, daily weights, I &O's and prescribed diet. 14 Telemetry box in place, patient aware of placement and reason. Bed locked, in lowest position, side rails up 2/4, call light within reach.        Recliner Assessment  Patient is able to demonstrate the ability to move from a reclining position to an upright position within the recliner.       4 Eyes Skin Assessment     NAME:  Severino Lozano  YOB: 1955  MEDICAL RECORD NUMBER:  [id number]    The patient is being assessed for  Admission    I agree that at least one RN has performed a thorough Head to Toe Skin Assessment on the patient. ALL assessment sites listed below have been assessed.      Areas assessed by both nurses:    Head, Face, Ears, Shoulders, Back, Chest, Arms, Elbows, Hands, Sacrum. Buttock, Coccyx, Ischium, Legs. Feet and Heels, and Under Medical Devices        Excoriation noted to right arm pit and right groin area by leg.  Does the Patient have a Wound? No noted wound(s)       Pieter Prevention initiated by RN: No  Wound Care Orders initiated by RN: No    Pressure Injury (Stage 3,4, Unstageable, DTI, NWPT, and Complex wounds) if present, place Wound referral order by RN under : No    New Ostomies, if present place, Ostomy referral order under : No     Nurse 1 eSignature: Electronically signed by Tsering Rosen RN on 2/2/25 at 12:05 AM EST    **SHARE this note so that the co-signing nurse can place an eSignature**    Nurse 2 eSignature: Electronically signed by Maria De Jesus Morejon RN on 2/2/25 at 12:34 AM EST

## 2025-02-02 NOTE — PROGRESS NOTES
Clinical Pharmacy Progress Note  Medication History     Pharmacy has noted some discrepancies within home medication list and feels compliance (and possibly cost) may need to be addressed with patient.    Medication Sig Notes   furosemide (LASIX) 20 MG tablet Take 1 tablet by mouth daily Last prescription of 30 tabs on 12/16/24 from [facility]. Told pt MUST get labs for refills on 12/16 and 9/29. Previous to that, prescription for 6 months on 4/29/24 from [facility].    Last filled #10 on 12/16/24.    albuterol sulfate HFA  inhaler Inhale 2 puffs into the lungs every 4 hours as needed  Last prescription 10/7/24 for a total of 6 months from [facility].    Last filled 10/7/24.    fluticasone (FLONASE) 50 MCG/ACT nasal spray 2 sprays by Nasal route daily Last prescription 10/7/24 for a total of 6 months from [facility].    Last filled 30 days 10/16/24.    XARELTO 20 MG TABS tablet TAKE ONE TABLET BY MOUTH EVERY DAY with breakfast Last prescription 8/5/24 for a total of 6 months from [facility].    Last filled 30 days 11/27/24.    metoprolol succinate (TOPROL XL) 200 MG extended release tablet TAKE ONE TABLET BY MOUTH EVERY DAY Last prescription 8/5/24 for a total of 6 months from [facility].    Last filled 30 days 11/27/24.   fluticasone-umeclidin-vilant (TRELEGY ELLIPTA) 100-62.5-25 MCG/ACT AEPB  Inhale 1 puff into the lungs daily Last prescription 4/3/24 for a total of 6 months from [facility].    Last filled 30 days 9/9/24.    atorvastatin (LIPITOR) 40 MG tablet TAKE ONE TABLET BY MOUTH AT BEDTIME Last prescription 2/23/24 for a total of 9 months from [facility].    Last filled 30 days 10/7/24.    ENTRESTO 24-26 MG per tablet TAKE ONE TABLET BY MOUTH TWICE DAILY Last prescription 2/23/24 for a total of 9 months from [facility].    Last filled 30 days 10/7/24.    ipratropium-albuterol (DUONEB) 0.5-2.5 (3) MG/3ML SOLN neb solution Inhale 3 mLs into the lungs every 4 hours as needed  Last prescription 12/2/22 from [facility].     Last filled

## 2025-02-02 NOTE — FLOWSHEET NOTE
02/02/25 1206   Vital Signs   Temp 97.7 °F (36.5 °C)   Temp Source Oral   Pulse 93   Heart Rate Source Monitor   Respirations 20   BP (!) 112/94   MAP (Calculated) 100   BP Location Right upper arm   BP Method Automatic   Patient Position Sitting   Pain Assessment   Pain Assessment None - Denies Pain   Oxygen Therapy   SpO2 100 %   O2 Device Nasal cannula   O2 Flow Rate (L/min) 2.5 L/min     Vitals and reassessment completed. No significant changes. Patient requesting something for cough. PRN Tessalon given. No further needs at this time.     Kary Harvey, RN

## 2025-02-02 NOTE — PLAN OF CARE
Problem: Chronic Conditions and Co-morbidities  Goal: Patient's chronic conditions and co-morbidity symptoms are monitored and maintained or improved  Outcome: Progressing  Flowsheets (Taken 2/2/2025 0851)  Care Plan - Patient's Chronic Conditions and Co-Morbidity Symptoms are Monitored and Maintained or Improved: Monitor and assess patient's chronic conditions and comorbid symptoms for stability, deterioration, or improvement     Problem: Discharge Planning  Goal: Discharge to home or other facility with appropriate resources  Outcome: Progressing  Flowsheets  Taken 2/2/2025 0851 by Kary Harvey RN  Discharge to home or other facility with appropriate resources:   Identify barriers to discharge with patient and caregiver   Arrange for needed discharge resources and transportation as appropriate   Identify discharge learning needs (meds, wound care, etc)  Taken 2/1/2025 2330 by Tsering Rosen RN  Discharge to home or other facility with appropriate resources:   Identify barriers to discharge with patient and caregiver   Identify discharge learning needs (meds, wound care, etc)   Refer to discharge planning if patient needs post-hospital services based on physician order or complex needs related to functional status, cognitive ability or social support system   Arrange for needed discharge resources and transportation as appropriate   Arrange for interpreters to assist at discharge as needed     Problem: ABCDS Injury Assessment  Goal: Absence of physical injury  Outcome: Progressing     Problem: Safety - Adult  Goal: Free from fall injury  Outcome: Progressing

## 2025-02-02 NOTE — FLOWSHEET NOTE
02/02/25 0732   Vital Signs   Temp 97.5 °F (36.4 °C)   Temp Source Oral   Pulse 83   Heart Rate Source Monitor   Respirations 20   /81   MAP (Calculated) 97   BP Location Left upper arm   BP Method Automatic   Patient Position Semi fowlers   Oxygen Therapy   SpO2 98 %   O2 Device Nasal cannula   O2 Flow Rate (L/min) 2.5 L/min     Vitals and AM assessment completed. No s/s of distress, VSS. Patient resting comfortably in bed. Medications given per MAR. Awaiting tamiflu and flonase from pharmacy. No further needs at this time.     Kary Harvey RN

## 2025-02-02 NOTE — PROGRESS NOTES
02/02/25 1414   RT Protocol   History Pulmonary Disease 2   Respiratory pattern 0   Breath sounds 2   Cough 1   Indications for Bronchodilator Therapy On home bronchodilators   Bronchodilator Assessment Score 5

## 2025-02-02 NOTE — H&P
V2.0  History and Physical      Name:  Severino Lozano /Age/Sex: 1955  (70 y.o. male)   MRN & CSN:  [id number] & [id number] Encounter Date/Time: 2025 9:20 PM EST   Location:  /0327-01 PCP: Amanda Rosales APRN - NP       Hospital Day: 2    Assessment and Plan:   Severino Lozano is a 70 y.o. male with a pmh of     Past Medical History:   Diagnosis Date    CAD (coronary artery disease)     Cardiomyopathy (HCC)     CHF (congestive heart failure) (HCC)     COPD (chronic obstructive pulmonary disease) (HCC)     PAF (paroxysmal atrial fibrillation) ([facility])     Pulmonary nodules            who presents with Atrial fibrillation with rapid ventricular response ([facility])    Hospital Problems             Last Modified POA    * (Principal) Atrial fibrillation with rapid ventricular response ([facility]) 2025 Yes       Atrial fibrillation with rapid ventricular response, hx of cardiomyopathy, hx of biventricular heart failure, hx of CAD:   - cardiology consulted  - telemetry   - treat fevers / acetaminophen prn   -   - aspirin   - entresto 24-26 mg po BID   - metoprolol 200 mgp o daily   - atorvastatin 40 mg po qhs   - furosemide 20 mg po daily - HELD   - rivaroxaban 20 mg po dialy   - daily weights   - IOs     Influenza A, chronic respiratory failure with hypoxia, COPD with acute exacerbation:   - methylprednisolone 60 mg IV BID   - duonebs q4 hrs WA   - oseltamivir 75 mg po BID  - trelegy HELD     Alcohol abuse:   - CIWA procotol with oral ativan   - encouraged to quit drinking but he doesn't want to give it up  - MV po daily, thiamine, folate     HTN:   - as noted above     FULL CODE     Disposition:   Current Living situation: at home   Expected Disposition: to home   Estimated D/C: 2-3 days     Diet ADULT DIET; Regular   DVT Prophylaxis [] Lovenox, []  Heparin, [] SCDs, [] Ambulation,  [] Eliquis, [x] Xarelto, [] Coumadin   Code Status Full Code   Surrogate Decision Maker/ POA - brother    follow-up is necessary.     XR CHEST PORTABLE    Result Date: 2/1/2025  EXAMINATION: ONE XRAY VIEW OF THE CHEST 2/1/2025 6:28 pm COMPARISON: 09/19/2021. HISTORY: ORDERING SYSTEM PROVIDED HISTORY: SOB, hypoxia TECHNOLOGIST PROVIDED HISTORY: Reason for exam:->SOB, hypoxia Reason for Exam: sob, hypoxia, cp FINDINGS: The cardiac silhouette is enlarged.  The cardiac silhouette is enlarged and increased in size compared to the previous study.  There is blunting of the costophrenic sulci bilaterally consistent with small effusions.  No acute pulmonary infiltrate or evidence of overt failure.  Small nodule in the right upper lobe is similar in size and and has been previously evaluated by CT. Remote calcified granulomatous disease.     Cardiomegaly with increased cardiac size raising the possibility of underlying pericardial effusion. Small bilateral pleural effusions.  No overt failure or focal infiltrate. Right upper lobe pulmonary nodule.  See CT report 05/08/2024.  Consider repeat CT in several months as recommended on the CT report.         Electronically signed by Mushtaq Sanchez MD on 2/2/2025 at 12:55 AM

## 2025-02-02 NOTE — FLOWSHEET NOTE
02/02/25 1637   Vital Signs   Temp 97 °F (36.1 °C)   Temp Source Axillary   Pulse 87   Heart Rate Source Monitor   Respirations 16   BP (!) 129/90   MAP (Calculated) 103   BP Location Left upper arm   BP Method Automatic   Patient Position Right side   Oxygen Therapy   SpO2 99 %   O2 Device Nasal cannula   O2 Flow Rate (L/min) 2.5 L/min     Vitals and reassessment completed. No significant changes. Patient is refusing to eat, he states that he is afraid to mess himself. No further needs at this time.     Kary Harvey, RN

## 2025-02-03 PROBLEM — F10.10 ALCOHOL ABUSE: Status: ACTIVE | Noted: 2025-02-03

## 2025-02-03 PROBLEM — J44.1 COPD EXACERBATION (HCC): Status: ACTIVE | Noted: 2024-02-06

## 2025-02-03 PROBLEM — J10.1 INFLUENZA A: Status: ACTIVE | Noted: 2025-02-03

## 2025-02-03 PROCEDURE — 94761 N-INVAS EAR/PLS OXIMETRY MLT: CPT

## 2025-02-03 PROCEDURE — 2500000003 HC RX 250 WO HCPCS: Performed by: PEDIATRICS

## 2025-02-03 PROCEDURE — 6370000000 HC RX 637 (ALT 250 FOR IP): Performed by: INTERNAL MEDICINE

## 2025-02-03 PROCEDURE — 6360000002 HC RX W HCPCS: Performed by: INTERNAL MEDICINE

## 2025-02-03 PROCEDURE — 94640 AIRWAY INHALATION TREATMENT: CPT

## 2025-02-03 PROCEDURE — 2700000000 HC OXYGEN THERAPY PER DAY

## 2025-02-03 PROCEDURE — 2060000000 HC ICU INTERMEDIATE R&B

## 2025-02-03 PROCEDURE — 2500000003 HC RX 250 WO HCPCS: Performed by: INTERNAL MEDICINE

## 2025-02-03 PROCEDURE — 6370000000 HC RX 637 (ALT 250 FOR IP): Performed by: PEDIATRICS

## 2025-02-03 PROCEDURE — 99232 SBSQ HOSP IP/OBS MODERATE 35: CPT | Performed by: INTERNAL MEDICINE

## 2025-02-03 RX ADMIN — BENZONATATE 200 MG: 100 CAPSULE ORAL at 20:12

## 2025-02-03 RX ADMIN — SACUBITRIL AND VALSARTAN 1 TABLET: 24; 26 TABLET, FILM COATED ORAL at 09:02

## 2025-02-03 RX ADMIN — Medication 10 ML: at 20:15

## 2025-02-03 RX ADMIN — Medication 1 TABLET: at 09:02

## 2025-02-03 RX ADMIN — ALBUTEROL SULFATE 2 PUFF: 90 AEROSOL, METERED RESPIRATORY (INHALATION) at 08:39

## 2025-02-03 RX ADMIN — ATORVASTATIN CALCIUM 40 MG: 40 TABLET, FILM COATED ORAL at 20:12

## 2025-02-03 RX ADMIN — WATER 40 MG: 1 INJECTION INTRAMUSCULAR; INTRAVENOUS; SUBCUTANEOUS at 20:12

## 2025-02-03 RX ADMIN — THERA TABS 1 TABLET: TAB at 09:02

## 2025-02-03 RX ADMIN — FOLIC ACID 1 MG: 1 TABLET ORAL at 09:02

## 2025-02-03 RX ADMIN — Medication 10 ML: at 09:04

## 2025-02-03 RX ADMIN — FLUTICASONE PROPIONATE 2 SPRAY: 50 SPRAY, METERED NASAL at 09:02

## 2025-02-03 RX ADMIN — SACUBITRIL AND VALSARTAN 1 TABLET: 24; 26 TABLET, FILM COATED ORAL at 20:12

## 2025-02-03 RX ADMIN — Medication 75 MG: at 09:08

## 2025-02-03 RX ADMIN — Medication 75 MG: at 22:13

## 2025-02-03 RX ADMIN — WATER 40 MG: 1 INJECTION INTRAMUSCULAR; INTRAVENOUS; SUBCUTANEOUS at 09:08

## 2025-02-03 RX ADMIN — RIVAROXABAN 20 MG: 20 TABLET, FILM COATED ORAL at 09:01

## 2025-02-03 RX ADMIN — METOPROLOL SUCCINATE 200 MG: 50 TABLET, EXTENDED RELEASE ORAL at 09:01

## 2025-02-03 RX ADMIN — Medication 100 MG: at 09:02

## 2025-02-03 NOTE — PROGRESS NOTES
Patient resting in bed, patient is pink, warm, and dry. Respirations E/E on 3l/m/mc. Iv sites unremarkable. No S/S of acute distress noted. Head to toe completed at this time. Patient is A/O x4. Very anxious and sweating, CIWA completed. Medication given patient tolerated well.  Call light in easy reach, patient reminded to use call light for needs and assistance. Bed locked and in lowest position side rails up x2.

## 2025-02-03 NOTE — PROGRESS NOTES
Writer in room patient complaining of left sided chest pain and difficulty breathing. Stat EKG and troponin ordered. MD olivier for orders.  /66  Oxygen 98 on 3L.

## 2025-02-03 NOTE — CARE COORDINATION
Case Management Assessment  Initial Evaluation    Date/Time of Evaluation: 2/3/2025 9:33 AM  Assessment Completed by: Ariela Reynolds RN    If patient is discharged prior to next notation, then this note serves as note for discharge by case management.    Patient Name: Severino Lozano                   YOB: 1955  Diagnosis: Influenza A [J10.1]  Elevated troponin [R79.89]  Atrial fibrillation with rapid ventricular response (HCC) [I48.91]  Atrial fibrillation with RVR (HCC) [I48.91]                   Date / Time: 2/1/2025  5:50 PM    Patient Admission Status: Inpatient   Readmission Risk (Low < 19, Mod (19-27), High > 27): Readmission Risk Score: 8.3    Current PCP: Amanda Rosales APRN - NP  PCP verified by CM? Yes    Chart Reviewed: Yes      History Provided by: Patient  Patient Orientation: Alert and Oriented    Patient Cognition: Alert    Hospitalization in the last 30 days (Readmission):  No    If yes, Readmission Assessment in CM Navigator will be completed.    Advance Directives:      Code Status: Full Code   Patient's Primary Decision Maker is: Legal Next of Kin    Primary Decision Maker: Tsering Lozano - Brother/Sister - [phone]    Primary Decision Maker: Hitesh Lozano - Brother/Sister - [phone]    Discharge Planning:    Patient lives with: Family Members Type of Home: Trailer/Mobile Home  Primary Care Giver: Private caregiver  Patient Support Systems include: Family Members   Current Financial resources: Medicare, Medicaid  Current community resources: None  Current services prior to admission: Oxygen Therapy, Home Care ([facility])            Current DME:              Type of Home Care services:  Aide Services    ADLS  Prior functional level: Assistance with the following:, Dressing, Bathing, Toileting, Cooking, Housework, Shopping, Mobility  Current functional level: Assistance with the following:, Bathing, Dressing, Toileting, Cooking, Housework, Shopping, Mobility    PT

## 2025-02-03 NOTE — PROGRESS NOTES
RT Inhaler-Nebulizer Bronchodilator Protocol Note    There is a bronchodilator order in the chart from a provider indicating to follow the RT Bronchodilator Protocol and there is an “Initiate RT Inhaler-Nebulizer Bronchodilator Protocol” order as well (see protocol at bottom of note).    CXR Findings:  XR CHEST PORTABLE    Result Date: 2/1/2025  Cardiomegaly with increased cardiac size raising the possibility of underlying pericardial effusion. Small bilateral pleural effusions.  No overt failure or focal infiltrate. Right upper lobe pulmonary nodule.  See CT report 05/08/2024.  Consider repeat CT in several months as recommended on the CT report.       The findings from the last RT Protocol Assessment were as follows:   History Pulmonary Disease: Chronic pulmonary disease  Respiratory Pattern: Dyspnea on exertion or RR 21-25 bpm  Breath Sounds: Slightly diminished and/or crackles  Cough: Strong, spontaneous, non-productive  Indication for Bronchodilator Therapy: Decreased or absent breath sounds  Bronchodilator Assessment Score: 6    Aerosolized bronchodilator medication orders have been revised according to the RT Inhaler-Nebulizer Bronchodilator Protocol below.    Respiratory Therapist to perform RT Therapy Protocol Assessment initially then follow the protocol.  Repeat RT Therapy Protocol Assessment PRN for score 0-3 or on second treatment, BID, and PRN for scores above 3.    No Indications - adjust the frequency to every 6 hours PRN wheezing or bronchospasm, if no treatments needed after 48 hours then discontinue using Per Protocol order mode.     If indication present, adjust the RT bronchodilator orders based on the Bronchodilator Assessment Score as indicated below.  Use Inhaler orders unless patient has one or more of the following: on home nebulizer, not able to hold breath for 10 seconds, is not alert and oriented, cannot activate and use MDI correctly, or respiratory rate 25 breaths per minute or more,

## 2025-02-03 NOTE — PROGRESS NOTES
[place] [facility]    Daily Progress Note for 2/3/2025 8:51 AM /0327-01  Severino Lozano : 1955 Age: 70 y.o. Sex: male  Length of Stay:  2    Interval History:      CC: F/U Shortness of Breath (Patient wears 2L O2 at baseline and was not connected when EMS arrived. SpO2 in the 70's. Family has the flu. -160's.)    Subjective:       Still having cough and shortness of breath today but feels better than when he came in. Reports he has chronic intermittent bouts of diarrhea.     Objective:     Vitals:    25 2322 25 2353 25 0536 25 0841   BP: 110/64 111/62 130/76    Pulse: (!) 118 (!) 116 97    Resp:  20 20    Temp:  98.2 °F (36.8 °C) 98 °F (36.7 °C)    TempSrc:  Axillary Axillary    SpO2:  98% 100% 99%   Weight:   105.3 kg (232 lb 2 oz)    Height:            No intake or output data in the 24 hours ending 25 0851  Body mass index is 34.28 kg/m².    Physical Exam:  General: Cooperative, pleasant/Ill appearing, on  Nasal cannula  HEENT:  Head: normocephalic,atraumatic, anicteric sclera, clear conjunctiva  Neck: Normal size, Jugular venous pulsations: normal  Respiratory:unlabored breathing, clear to auscultation with no crackles, wheezes rhonchi  Heart: Regular rate and rhythm, S1, S2-normal, No murmurs  Abdomen: soft, nondistended, nontender, normoactive bowel sounds,  Neurological/Psych: Alert and oriented times three, no focal neurological deficits, Mood and affect appropriate.  Skin: No obvious rashes    Extremities:  no edema, Pedal pulses 2+ bilaterally    Scheduled Medications:  oseltamivir 6mg/ml, 75 mg, BID  atorvastatin, 40 mg, Nightly  fluticasone, 2 spray, Daily  metoprolol succinate, 200 mg, Daily  therapeutic multivitamin-minerals, 1 tablet, Daily  rivaroxaban, 20 mg, Daily with breakfast  albuterol sulfate HFA, 2 puff, BID RT  sodium chloride flush, 5-40 mL, 2 times per day  thiamine, 100 mg, Daily  multivitamin, 1 tablet,

## 2025-02-03 NOTE — PROGRESS NOTES
Bedside report and transfer of care given to PERLA Cagle. Pt currently resting in bed with the call light within reach. Pt denies any other care needs at this time. Pt stable at this time.    Kary Harvey RN

## 2025-02-03 NOTE — PROGRESS NOTES
Bedside report and transfer of care given to Jhon DUNCAN. Pt currently resting in bed with the call light within reach. Pt denies any other care needs at this time. Pt stable at this time.

## 2025-02-03 NOTE — FLOWSHEET NOTE
02/02/25 2353   Vital Signs   Temp 98.2 °F (36.8 °C)   Temp Source Axillary   Pulse (!) 116   Heart Rate Source Monitor   Respirations 20   /62   MAP (Calculated) 78   BP Location Left upper arm   BP Method Automatic   Patient Position Semi fowlers   Oxygen Therapy   SpO2 98 %   O2 Device Nasal cannula   O2 Flow Rate (L/min) 3 L/min     Patient resting in bed watching tv. No S/S of acute distress noted. Call light in easy reach.

## 2025-02-04 LAB
ANION GAP SERPL CALCULATED.3IONS-SCNC: 8 MMOL/L (ref 3–16)
BASOPHILS # BLD: 0 K/UL (ref 0–0.2)
BASOPHILS NFR BLD: 0.1 %
BUN SERPL-MCNC: 21 MG/DL (ref 7–20)
CALCIUM SERPL-MCNC: 8 MG/DL (ref 8.3–10.6)
CHLORIDE SERPL-SCNC: 95 MMOL/L (ref 99–110)
CO2 SERPL-SCNC: 32 MMOL/L (ref 21–32)
CREAT SERPL-MCNC: 0.6 MG/DL (ref 0.8–1.3)
DEPRECATED RDW RBC AUTO: 13.4 % (ref 12.4–15.4)
EKG ATRIAL RATE: 110 BPM
EKG DIAGNOSIS: NORMAL
EKG Q-T INTERVAL: 326 MS
EKG QRS DURATION: 98 MS
EKG QTC CALCULATION (BAZETT): 466 MS
EKG R AXIS: 268 DEGREES
EKG T AXIS: 40 DEGREES
EKG VENTRICULAR RATE: 123 BPM
EOSINOPHIL # BLD: 0 K/UL (ref 0–0.6)
EOSINOPHIL NFR BLD: 0 %
GFR SERPLBLD CREATININE-BSD FMLA CKD-EPI: >90 ML/MIN/{1.73_M2}
GLUCOSE SERPL-MCNC: 123 MG/DL (ref 70–99)
HCT VFR BLD AUTO: 49.3 % (ref 40.5–52.5)
HGB BLD-MCNC: 16.3 G/DL (ref 13.5–17.5)
LYMPHOCYTES # BLD: 0.7 K/UL (ref 1–5.1)
LYMPHOCYTES NFR BLD: 8 %
MAGNESIUM SERPL-MCNC: 1.33 MG/DL (ref 1.8–2.4)
MCH RBC QN AUTO: 32.1 PG (ref 26–34)
MCHC RBC AUTO-ENTMCNC: 33 G/DL (ref 31–36)
MCV RBC AUTO: 97.3 FL (ref 80–100)
MONOCYTES # BLD: 0.6 K/UL (ref 0–1.3)
MONOCYTES NFR BLD: 6.5 %
NEUTROPHILS # BLD: 7.8 K/UL (ref 1.7–7.7)
NEUTROPHILS NFR BLD: 85.4 %
PLATELET # BLD AUTO: 193 K/UL (ref 135–450)
PMV BLD AUTO: 8.9 FL (ref 5–10.5)
POTASSIUM SERPL-SCNC: 4.8 MMOL/L (ref 3.5–5.1)
RBC # BLD AUTO: 5.07 M/UL (ref 4.2–5.9)
SODIUM SERPL-SCNC: 135 MMOL/L (ref 136–145)
WBC # BLD AUTO: 9.1 K/UL (ref 4–11)

## 2025-02-04 PROCEDURE — 94761 N-INVAS EAR/PLS OXIMETRY MLT: CPT

## 2025-02-04 PROCEDURE — 2700000000 HC OXYGEN THERAPY PER DAY

## 2025-02-04 PROCEDURE — 6370000000 HC RX 637 (ALT 250 FOR IP): Performed by: PEDIATRICS

## 2025-02-04 PROCEDURE — 6370000000 HC RX 637 (ALT 250 FOR IP)

## 2025-02-04 PROCEDURE — 80048 BASIC METABOLIC PNL TOTAL CA: CPT

## 2025-02-04 PROCEDURE — 94640 AIRWAY INHALATION TREATMENT: CPT

## 2025-02-04 PROCEDURE — 85025 COMPLETE CBC W/AUTO DIFF WBC: CPT

## 2025-02-04 PROCEDURE — 2500000003 HC RX 250 WO HCPCS: Performed by: INTERNAL MEDICINE

## 2025-02-04 PROCEDURE — 94010 BREATHING CAPACITY TEST: CPT

## 2025-02-04 PROCEDURE — 6370000000 HC RX 637 (ALT 250 FOR IP): Performed by: INTERNAL MEDICINE

## 2025-02-04 PROCEDURE — 99232 SBSQ HOSP IP/OBS MODERATE 35: CPT | Performed by: INTERNAL MEDICINE

## 2025-02-04 PROCEDURE — 2060000000 HC ICU INTERMEDIATE R&B

## 2025-02-04 PROCEDURE — 2500000003 HC RX 250 WO HCPCS: Performed by: PEDIATRICS

## 2025-02-04 PROCEDURE — 83735 ASSAY OF MAGNESIUM: CPT

## 2025-02-04 PROCEDURE — 6360000002 HC RX W HCPCS: Performed by: INTERNAL MEDICINE

## 2025-02-04 PROCEDURE — 36415 COLL VENOUS BLD VENIPUNCTURE: CPT

## 2025-02-04 PROCEDURE — 93010 ELECTROCARDIOGRAM REPORT: CPT | Performed by: INTERNAL MEDICINE

## 2025-02-04 RX ORDER — MAGNESIUM SULFATE IN WATER 40 MG/ML
4000 INJECTION, SOLUTION INTRAVENOUS ONCE
Status: COMPLETED | OUTPATIENT
Start: 2025-02-04 | End: 2025-02-04

## 2025-02-04 RX ADMIN — FLUTICASONE PROPIONATE 2 SPRAY: 50 SPRAY, METERED NASAL at 08:39

## 2025-02-04 RX ADMIN — WATER 40 MG: 1 INJECTION INTRAMUSCULAR; INTRAVENOUS; SUBCUTANEOUS at 21:00

## 2025-02-04 RX ADMIN — BENZONATATE 200 MG: 100 CAPSULE ORAL at 21:00

## 2025-02-04 RX ADMIN — Medication 10 ML: at 08:39

## 2025-02-04 RX ADMIN — FOLIC ACID 1 MG: 1 TABLET ORAL at 08:39

## 2025-02-04 RX ADMIN — BENZONATATE 200 MG: 100 CAPSULE ORAL at 08:48

## 2025-02-04 RX ADMIN — BENZONATATE 200 MG: 100 CAPSULE ORAL at 14:17

## 2025-02-04 RX ADMIN — WATER 40 MG: 1 INJECTION INTRAMUSCULAR; INTRAVENOUS; SUBCUTANEOUS at 08:49

## 2025-02-04 RX ADMIN — ATORVASTATIN CALCIUM 40 MG: 40 TABLET, FILM COATED ORAL at 21:00

## 2025-02-04 RX ADMIN — Medication 75 MG: at 21:00

## 2025-02-04 RX ADMIN — Medication 1 TABLET: at 08:39

## 2025-02-04 RX ADMIN — Medication 100 MG: at 08:39

## 2025-02-04 RX ADMIN — METOPROLOL SUCCINATE 200 MG: 50 TABLET, EXTENDED RELEASE ORAL at 08:39

## 2025-02-04 RX ADMIN — Medication 75 MG: at 08:35

## 2025-02-04 RX ADMIN — MAGNESIUM SULFATE HEPTAHYDRATE 4000 MG: 40 INJECTION, SOLUTION INTRAVENOUS at 10:07

## 2025-02-04 RX ADMIN — ALBUTEROL SULFATE 2 PUFF: 90 AEROSOL, METERED RESPIRATORY (INHALATION) at 08:32

## 2025-02-04 RX ADMIN — SACUBITRIL AND VALSARTAN 1 TABLET: 24; 26 TABLET, FILM COATED ORAL at 21:00

## 2025-02-04 RX ADMIN — ALBUTEROL SULFATE 2 PUFF: 90 AEROSOL, METERED RESPIRATORY (INHALATION) at 19:59

## 2025-02-04 RX ADMIN — Medication: at 21:09

## 2025-02-04 RX ADMIN — SACUBITRIL AND VALSARTAN 1 TABLET: 24; 26 TABLET, FILM COATED ORAL at 08:39

## 2025-02-04 RX ADMIN — Medication 10 ML: at 21:04

## 2025-02-04 RX ADMIN — RIVAROXABAN 20 MG: 20 TABLET, FILM COATED ORAL at 08:39

## 2025-02-04 RX ADMIN — THERA TABS 1 TABLET: TAB at 08:39

## 2025-02-04 ASSESSMENT — COPD QUESTIONNAIRES
QUESTION2_CHESTPHLEGM: 2
QUESTION3_CHESTTIGHTNESS: 2
QUESTION6_LEAVINGHOUSE: 3
QUESTION1_COUGHFREQUENCY: 3
QUESTION5_HOMEACTIVITIES: 3
QUESTION7_SLEEPQUALITY: 3
QUESTION4_WALKINCLINE: 4
QUESTION8_ENERGYLEVEL: 3
CAT_TOTALSCORE: 23

## 2025-02-04 NOTE — PROGRESS NOTES
[place] [facility]    Daily Progress Note for 2025 8:31 AM /0327-01  Severino Lozano : 1955 Age: 70 y.o. Sex: male  Length of Stay:  3    Interval History:      CC: F/U Shortness of Breath (Patient wears 2L O2 at baseline and was not connected when EMS arrived. SpO2 in the 70's. Family has the flu. -160's.)    Subjective:       Still having cough and shortness of breath today he says he does not feel well enough to go home.     Objective:     Vitals:    25 1637 25 2358 25 0457   BP: (!) 100/59 115/64 114/62 131/76   Pulse: 98 90 89 91   Resp: 20 18 18 18   Temp: 98.4 °F (36.9 °C) 97.9 °F (36.6 °C) 98.1 °F (36.7 °C) 98.7 °F (37.1 °C)   TempSrc: Oral Oral Oral Oral   SpO2: 98% 100% 98% 100%   Weight:    105.5 kg (232 lb 9.6 oz)   Height:              Intake/Output Summary (Last 24 hours) at 2025 0831  Last data filed at 2025 0457  Gross per 24 hour   Intake 222 ml   Output 925 ml   Net -703 ml     Body mass index is 34.35 kg/m².    Physical Exam:  General: Cooperative, pleasant/Ill appearing, on  Nasal cannula  HEENT:  Head: normocephalic,atraumatic, anicteric sclera, clear conjunctiva  Neck: Normal size, Jugular venous pulsations: normal  Respiratory:unlabored breathing, clear to auscultation with no crackles, wheezes rhonchi  Heart: Regular rate and rhythm, S1, S2-normal, No murmurs  Abdomen: soft, nondistended, nontender, normoactive bowel sounds,  Neurological/Psych: Alert and oriented times three, no focal neurological deficits, Mood and affect appropriate.  Skin: No obvious rashes    Extremities:  no edema, Pedal pulses 2+ bilaterally    Scheduled Medications:  methylPREDNISolone, 40 mg, Q12H  oseltamivir 6mg/ml, 75 mg, BID  atorvastatin, 40 mg, Nightly  fluticasone, 2 spray, Daily  metoprolol succinate, 200 mg, Daily  therapeutic multivitamin-minerals, 1 tablet, Daily  rivaroxaban, 20 mg, Daily with breakfast  albuterol  measuring up to 3.7 cm, which can be seen   the setting of pulmonary artery hypertension.   4. Stable right upper lobe pulmonary nodules.  Stable 1.8 cm right adrenal   nodule.  No routine follow-up is necessary.         XR CHEST PORTABLE   Final Result   Cardiomegaly with increased cardiac size raising the possibility of   underlying pericardial effusion.      Small bilateral pleural effusions.  No overt failure or focal infiltrate.      Right upper lobe pulmonary nodule.  See CT report 05/08/2024.  Consider   repeat CT in several months as recommended on the CT report.               Lab Results   Component Value Date    LABA1C 5.4 09/15/2021      Body mass index is 34.35 kg/m².       Impression/Plan:        Atrial fibrillation with rapid ventricular response, hx of cardiomyopathy, hx of biventricular heart failure, hx of CAD:   - cardiology consulted  - telemetry   - aspirin   - entresto 24-26 mg po BID   - metoprolol 200 mgp o daily   - atorvastatin 40 mg po qhs   - furosemide 20 mg po daily - HELD for now due to recent diarrhea, consider resuming in am  - rivaroxaban 20 mg po dialy   - daily weights   - IOs      Influenza A, chronic respiratory failure with hypoxia, COPD with acute exacerbation:   - methylprednisolone 40 mg IV BID   - duonebs q4 hrs WA   - oseltamivir 75 mg po BID  - trelegy at home      Alcohol abuse:   - CIWA procotol with oral ativan   - encouraged to quit drinking but he doesn't want to give it up  - MV po daily, thiamine, folate   - monitor     HTN:   - as noted above    Diarrhea  -Patient reports is chronic intermittent  -Monitor     Diarrhea, chronic intermittent per patient    Management discussed with RN,     Electronically signed by: Iza Shields DO, 2/4/2025 8:31 AM

## 2025-02-04 NOTE — PROGRESS NOTES
Bedside report and transfer of care given to PERLA Edwards. Pt currently resting in bed with the call light within reach. Pt denies any other care needs at this time. Pt stable at this time.

## 2025-02-04 NOTE — PROGRESS NOTES
Physician Progress Note      PATIENT:               CHARLA HASSAN  [facility] #:                  [id number]  :                       1955  ADMIT DATE:       2025 5:50 PM  DISCH DATE:  RESPONDING  PROVIDER #:        Iza Shields DO          QUERY TEXT:    Patient admitted with atrial fibrillation and is maintained on Xarelto. If   possible, please document in progress notes and discharge summary if you are   evaluating and/or treating any of the following:?  ?  The medical record reflects the following:  Risk Factors: permanent atrial fibrillation, male, CAD, HTN, CHF  Clinical Indicators: Cardiology 2/2- Prior echocardiogram showed moderate LV   dysfunction with EF 35-40 %.  Stress test was negative for inducible ischemia.  Treatment: Xarelto  Options provided:  -- Secondary hypercoagulable state in a patient with atrial fibrillation  -- Other - I will add my own diagnosis  -- Disagree - Not applicable / Not valid  -- Disagree - Clinically unable to determine / Unknown  -- Refer to Clinical Documentation Reviewer    PROVIDER RESPONSE TEXT:    This patient has secondary hypercoagulable state in a patient with atrial   fibrillation.    Query created by: Venessa Hughes on 2/3/2025 3:02 PM      Electronically signed by:  Iza Shields DO 2025 11:28 AM

## 2025-02-04 NOTE — PLAN OF CARE
Problem: Respiratory - Adult  Goal: Achieves optimal ventilation and oxygenation  Outcome: Progressing  Flowsheets (Taken 2/4/2025 0237)  Achieves optimal ventilation and oxygenation:   Assess for changes in respiratory status   Assess for changes in mentation and behavior   Oxygen supplementation based on oxygen saturation or arterial blood gases

## 2025-02-04 NOTE — FLOWSHEET NOTE
02/03/25 2002   Vital Signs   Temp 97.9 °F (36.6 °C)   Temp Source Oral   Pulse 90   Heart Rate Source Monitor   Respirations 18   /64   MAP (Calculated) 81   BP Location Left upper arm   BP Method Automatic   Patient Position Lying right side   Pain Assessment   Pain Assessment None - Denies Pain   Opioid-Induced Sedation   POSS Score 1   RASS   Simpson Agitation Sedation Scale (RASS) 0   Oxygen Therapy   SpO2 100 %   O2 Device Nasal cannula   O2 Flow Rate (L/min) 3 L/min     Assessment done and in flowsheets.  Patient is alert and oriented x4.  Call light within reach.

## 2025-02-04 NOTE — PROGRESS NOTES
Transferred care to PERLA Ferreira. Face to face bedside report given, no need voiced at this time. Pt in bed with eyes closed. No signs of distress noted.  Call light within reach.   Denies needs.

## 2025-02-05 PROBLEM — R79.89 ELEVATED TROPONIN: Status: ACTIVE | Noted: 2025-02-05

## 2025-02-05 LAB
ANION GAP SERPL CALCULATED.3IONS-SCNC: 6 MMOL/L (ref 3–16)
BASOPHILS # BLD: 0 K/UL (ref 0–0.2)
BASOPHILS NFR BLD: 0.1 %
BUN SERPL-MCNC: 22 MG/DL (ref 7–20)
CALCIUM SERPL-MCNC: 7.9 MG/DL (ref 8.3–10.6)
CHLORIDE SERPL-SCNC: 94 MMOL/L (ref 99–110)
CO2 SERPL-SCNC: 36 MMOL/L (ref 21–32)
CREAT SERPL-MCNC: 0.7 MG/DL (ref 0.8–1.3)
DEPRECATED RDW RBC AUTO: 12.9 % (ref 12.4–15.4)
EOSINOPHIL # BLD: 0 K/UL (ref 0–0.6)
EOSINOPHIL NFR BLD: 0 %
GFR SERPLBLD CREATININE-BSD FMLA CKD-EPI: >90 ML/MIN/{1.73_M2}
GLUCOSE SERPL-MCNC: 116 MG/DL (ref 70–99)
HCT VFR BLD AUTO: 47.3 % (ref 40.5–52.5)
HGB BLD-MCNC: 16 G/DL (ref 13.5–17.5)
LYMPHOCYTES # BLD: 0.4 K/UL (ref 1–5.1)
LYMPHOCYTES NFR BLD: 4.7 %
MAGNESIUM SERPL-MCNC: 1.7 MG/DL (ref 1.8–2.4)
MCH RBC QN AUTO: 32.1 PG (ref 26–34)
MCHC RBC AUTO-ENTMCNC: 33.8 G/DL (ref 31–36)
MCV RBC AUTO: 94.8 FL (ref 80–100)
MONOCYTES # BLD: 0.7 K/UL (ref 0–1.3)
MONOCYTES NFR BLD: 8.3 %
NEUTROPHILS # BLD: 7.6 K/UL (ref 1.7–7.7)
NEUTROPHILS NFR BLD: 86.9 %
PLATELET # BLD AUTO: 207 K/UL (ref 135–450)
PMV BLD AUTO: 8.7 FL (ref 5–10.5)
POTASSIUM SERPL-SCNC: 4.6 MMOL/L (ref 3.5–5.1)
RBC # BLD AUTO: 4.99 M/UL (ref 4.2–5.9)
SODIUM SERPL-SCNC: 136 MMOL/L (ref 136–145)
WBC # BLD AUTO: 8.8 K/UL (ref 4–11)

## 2025-02-05 PROCEDURE — 2060000000 HC ICU INTERMEDIATE R&B

## 2025-02-05 PROCEDURE — 83735 ASSAY OF MAGNESIUM: CPT

## 2025-02-05 PROCEDURE — 6370000000 HC RX 637 (ALT 250 FOR IP): Performed by: INTERNAL MEDICINE

## 2025-02-05 PROCEDURE — 6370000000 HC RX 637 (ALT 250 FOR IP): Performed by: PEDIATRICS

## 2025-02-05 PROCEDURE — 6360000002 HC RX W HCPCS

## 2025-02-05 PROCEDURE — 2500000003 HC RX 250 WO HCPCS: Performed by: INTERNAL MEDICINE

## 2025-02-05 PROCEDURE — 6360000002 HC RX W HCPCS: Performed by: INTERNAL MEDICINE

## 2025-02-05 PROCEDURE — 2700000000 HC OXYGEN THERAPY PER DAY

## 2025-02-05 PROCEDURE — 2500000003 HC RX 250 WO HCPCS: Performed by: PEDIATRICS

## 2025-02-05 PROCEDURE — 6370000000 HC RX 637 (ALT 250 FOR IP)

## 2025-02-05 PROCEDURE — 36415 COLL VENOUS BLD VENIPUNCTURE: CPT

## 2025-02-05 PROCEDURE — 94761 N-INVAS EAR/PLS OXIMETRY MLT: CPT

## 2025-02-05 PROCEDURE — 85025 COMPLETE CBC W/AUTO DIFF WBC: CPT

## 2025-02-05 PROCEDURE — 80048 BASIC METABOLIC PNL TOTAL CA: CPT

## 2025-02-05 PROCEDURE — 94640 AIRWAY INHALATION TREATMENT: CPT

## 2025-02-05 PROCEDURE — 99231 SBSQ HOSP IP/OBS SF/LOW 25: CPT

## 2025-02-05 RX ORDER — MAGNESIUM SULFATE 1 G/100ML
1000 INJECTION INTRAVENOUS ONCE
Status: COMPLETED | OUTPATIENT
Start: 2025-02-05 | End: 2025-02-05

## 2025-02-05 RX ORDER — PREDNISONE 20 MG/1
40 TABLET ORAL DAILY
Status: DISCONTINUED | OUTPATIENT
Start: 2025-02-05 | End: 2025-02-08 | Stop reason: HOSPADM

## 2025-02-05 RX ORDER — ALBUTEROL SULFATE 90 UG/1
2 INHALANT RESPIRATORY (INHALATION) EVERY 4 HOURS PRN
Status: DISCONTINUED | OUTPATIENT
Start: 2025-02-05 | End: 2025-02-08 | Stop reason: HOSPADM

## 2025-02-05 RX ORDER — OSELTAMIVIR PHOSPHATE 75 MG/1
75 CAPSULE ORAL 2 TIMES DAILY
Status: COMPLETED | OUTPATIENT
Start: 2025-02-05 | End: 2025-02-06

## 2025-02-05 RX ADMIN — ATORVASTATIN CALCIUM 40 MG: 40 TABLET, FILM COATED ORAL at 19:33

## 2025-02-05 RX ADMIN — Medication 1 TABLET: at 09:55

## 2025-02-05 RX ADMIN — RIVAROXABAN 20 MG: 20 TABLET, FILM COATED ORAL at 10:01

## 2025-02-05 RX ADMIN — FOLIC ACID 1 MG: 1 TABLET ORAL at 09:55

## 2025-02-05 RX ADMIN — Medication 10 ML: at 19:37

## 2025-02-05 RX ADMIN — Medication 100 MG: at 09:54

## 2025-02-05 RX ADMIN — SACUBITRIL AND VALSARTAN 1 TABLET: 24; 26 TABLET, FILM COATED ORAL at 09:55

## 2025-02-05 RX ADMIN — WATER 40 MG: 1 INJECTION INTRAMUSCULAR; INTRAVENOUS; SUBCUTANEOUS at 09:56

## 2025-02-05 RX ADMIN — Medication 75 MG: at 09:59

## 2025-02-05 RX ADMIN — SACUBITRIL AND VALSARTAN 1 TABLET: 24; 26 TABLET, FILM COATED ORAL at 19:33

## 2025-02-05 RX ADMIN — THERA TABS 1 TABLET: TAB at 09:54

## 2025-02-05 RX ADMIN — MAGNESIUM SULFATE HEPTAHYDRATE 1000 MG: 1 INJECTION, SOLUTION INTRAVENOUS at 14:32

## 2025-02-05 RX ADMIN — PREDNISONE 40 MG: 20 TABLET ORAL at 19:33

## 2025-02-05 RX ADMIN — FLUTICASONE PROPIONATE 2 SPRAY: 50 SPRAY, METERED NASAL at 10:00

## 2025-02-05 RX ADMIN — ALBUTEROL SULFATE 2 PUFF: 90 AEROSOL, METERED RESPIRATORY (INHALATION) at 17:25

## 2025-02-05 RX ADMIN — Medication 10 ML: at 10:05

## 2025-02-05 RX ADMIN — METOPROLOL SUCCINATE 200 MG: 50 TABLET, EXTENDED RELEASE ORAL at 09:54

## 2025-02-05 RX ADMIN — ALBUTEROL SULFATE 2 PUFF: 90 AEROSOL, METERED RESPIRATORY (INHALATION) at 07:40

## 2025-02-05 RX ADMIN — OSELTAMIVIR PHOSPHATE 75 MG: 75 CAPSULE ORAL at 19:33

## 2025-02-05 RX ADMIN — BENZONATATE 200 MG: 100 CAPSULE ORAL at 19:33

## 2025-02-05 NOTE — PLAN OF CARE
Problem: Safety - Adult  Goal: Free from fall injury  2/5/2025 0237 by Grace Benoit, RN  Outcome: Progressing  Flowsheets (Taken 2/5/2025 0237)  Free From Fall Injury: Instruct family/caregiver on patient safety

## 2025-02-05 NOTE — FLOWSHEET NOTE
02/04/25 2017   Vital Signs   Temp 98 °F (36.7 °C)   Temp Source Oral   Pulse 93   Heart Rate Source Monitor   Respirations 16   /82   MAP (Calculated) 100   BP Location Left upper arm   BP Method Automatic   Patient Position Semi fowlers   Oxygen Therapy   SpO2 100 %   O2 Device Nasal cannula   O2 Flow Rate (L/min) 2 L/min     Assessment done and in flowsheets.  Patient is alert and oriented x4.  O2 at 2L/min with normal breathing pattern.  Call light within reach.

## 2025-02-05 NOTE — PROGRESS NOTES
Transferred care to PERLA Kee. Face to face bedside report given, no need voiced at this time. Pt in bed with eyes closed. No signs of distress noted.  Call light within reach.   Denies needs.

## 2025-02-05 NOTE — PROGRESS NOTES
RT Inhaler-Nebulizer Bronchodilator Protocol Note    There is a bronchodilator order in the chart from a provider indicating to follow the RT Bronchodilator Protocol and there is an “Initiate RT Inhaler-Nebulizer Bronchodilator Protocol” order as well (see protocol at bottom of note).    CXR Findings:  No results found.    The findings from the last RT Protocol Assessment were as follows:   History Pulmonary Disease: (P) Chronic pulmonary disease  Respiratory Pattern: (P) Regular pattern and RR 12-20 bpm  Breath Sounds: (P) Slightly diminished and/or crackles  Cough: (P) Strong, productive  Indication for Bronchodilator Therapy: (P) Decreased or absent breath sounds  Bronchodilator Assessment Score: (P) 5    Aerosolized bronchodilator medication orders have been revised according to the RT Inhaler-Nebulizer Bronchodilator Protocol below.    Respiratory Therapist to perform RT Therapy Protocol Assessment initially then follow the protocol.  Repeat RT Therapy Protocol Assessment PRN for score 0-3 or on second treatment, BID, and PRN for scores above 3.    No Indications - adjust the frequency to every 6 hours PRN wheezing or bronchospasm, if no treatments needed after 48 hours then discontinue using Per Protocol order mode.     If indication present, adjust the RT bronchodilator orders based on the Bronchodilator Assessment Score as indicated below.  Use Inhaler orders unless patient has one or more of the following: on home nebulizer, not able to hold breath for 10 seconds, is not alert and oriented, cannot activate and use MDI correctly, or respiratory rate 25 breaths per minute or more, then use the equivalent nebulizer order(s) with same Frequency and PRN reasons based on the score.  If a patient is on this medication at home then do not decrease Frequency below that used at home.    0-3 - enter or revise RT bronchodilator order(s) to equivalent RT Bronchodilator order with Frequency of every 4 hours PRN for

## 2025-02-05 NOTE — PLAN OF CARE
Problem: Chronic Conditions and Co-morbidities  Goal: Patient's chronic conditions and co-morbidity symptoms are monitored and maintained or improved  Outcome: Progressing  Flowsheets (Taken 2/5/2025 0237)  Care Plan - Patient's Chronic Conditions and Co-Morbidity Symptoms are Monitored and Maintained or Improved: Monitor and assess patient's chronic conditions and comorbid symptoms for stability, deterioration, or improvement     Problem: Safety - Adult  Goal: Free from fall injury  Outcome: Progressing  Flowsheets (Taken 2/5/2025 0237)  Free From Fall Injury: Instruct family/caregiver on patient safety     Problem: Respiratory - Adult  Goal: Achieves optimal ventilation and oxygenation  Outcome: Progressing  Flowsheets (Taken 2/5/2025 0237)  Achieves optimal ventilation and oxygenation:   Assess for changes in respiratory status   Assess for changes in mentation and behavior   Position to facilitate oxygenation and minimize respiratory effort

## 2025-02-05 NOTE — CARE COORDINATION
Reviewed chart, has 24 hour care at home. 2 L O2 at baseline via [employer]. Remains on IV steroids. CM following.    1415 - Pt transited to PO steroids. Plan for DC tomorrow.

## 2025-02-05 NOTE — PROGRESS NOTES
Vitals:    02/05/25 0430   BP: (!) 142/91   Pulse: (!) 107   Resp: 18   Temp: 97.9 °F (36.6 °C)   SpO2: 98%     Patient is asleep but easy to wake up.  Changed malewick - danya care done.  Call light within reach.

## 2025-02-05 NOTE — PROGRESS NOTES
Progress Note    Admit Date:  2/1/2025    COPD   Influenza A +  Atrial fibrillation with RVR     Subjective:  Mr. Lozano today is seen resting in bed, feels better but absolutely does NOT want to go home.   He is worried about being re-exposed to the flu by children in the home    Objective:   Patient Vitals for the past 4 hrs:   BP Temp Temp src Pulse Resp SpO2   02/05/25 1057 123/70 97.8 °F (36.6 °C) Oral 97 18 100 %   02/05/25 0954 135/76 -- -- 96 -- --   02/05/25 0758 135/76 98 °F (36.7 °C) Oral 96 18 100 %          Intake/Output Summary (Last 24 hours) at 2/5/2025 1154  Last data filed at 2/5/2025 0430  Gross per 24 hour   Intake 90 ml   Output 950 ml   Net -860 ml       Physical Exam:    Gen: No distress. Alert. +Elderly male, chronically ill appearing   Eyes: PERRL. No sclera icterus. No conjunctival injection.   ENT: No discharge. Pharynx clear.   Neck: No JVD.  Trachea midline.  Resp: No accessory muscle use. No crackles. No wheezes. No rhonchi. ++Diminished breath sounds   CV: Regular rate. Regular rhythm. No murmur.  No rub. No edema.   Peripheral Pulses: +2 palpable, equal bilaterally   GI: Non-tender. Non-distended.  Normal bowel sounds.  Skin: Warm and dry. No nodule on exposed extremities. No rash on exposed extremities. +onychomycosis bilaterally   M/S: No cyanosis. No joint deformity. No clubbing.   Neuro: Awake. Grossly nonfocal    Psych: Oriented x 3. No anxiety +++agitation.         Medications:  oseltamivir, 75 mg, BID  methylPREDNISolone, 40 mg, Q12H  atorvastatin, 40 mg, Nightly  fluticasone, 2 spray, Daily  metoprolol succinate, 200 mg, Daily  therapeutic multivitamin-minerals, 1 tablet, Daily  rivaroxaban, 20 mg, Daily with breakfast  albuterol sulfate HFA, 2 puff, BID RT  sodium chloride flush, 5-40 mL, 2 times per day  thiamine, 100 mg, Daily  multivitamin, 1 tablet, Daily  folic acid, 1 mg, Daily  nicotine, 1 patch, Daily  sacubitril-valsartan, 1 tablet, BID      PRN  TOBI Edouard  02/05/25  12:04 PM

## 2025-02-06 LAB
ANION GAP SERPL CALCULATED.3IONS-SCNC: 9 MMOL/L (ref 3–16)
BACTERIA BLD CULT ORG #2: NORMAL
BACTERIA BLD CULT: NORMAL
BASOPHILS # BLD: 0 K/UL (ref 0–0.2)
BASOPHILS NFR BLD: 0.1 %
BUN SERPL-MCNC: 21 MG/DL (ref 7–20)
CALCIUM SERPL-MCNC: 7.7 MG/DL (ref 8.3–10.6)
CHLORIDE SERPL-SCNC: 97 MMOL/L (ref 99–110)
CO2 SERPL-SCNC: 29 MMOL/L (ref 21–32)
CREAT SERPL-MCNC: 0.7 MG/DL (ref 0.8–1.3)
DEPRECATED RDW RBC AUTO: 13.2 % (ref 12.4–15.4)
EOSINOPHIL # BLD: 0 K/UL (ref 0–0.6)
EOSINOPHIL NFR BLD: 0.1 %
GFR SERPLBLD CREATININE-BSD FMLA CKD-EPI: >90 ML/MIN/{1.73_M2}
GLUCOSE SERPL-MCNC: 114 MG/DL (ref 70–99)
HCT VFR BLD AUTO: 49.1 % (ref 40.5–52.5)
HGB BLD-MCNC: 16.1 G/DL (ref 13.5–17.5)
LYMPHOCYTES # BLD: 0.7 K/UL (ref 1–5.1)
LYMPHOCYTES NFR BLD: 7.6 %
MCH RBC QN AUTO: 31.8 PG (ref 26–34)
MCHC RBC AUTO-ENTMCNC: 32.8 G/DL (ref 31–36)
MCV RBC AUTO: 96.9 FL (ref 80–100)
MONOCYTES # BLD: 1.1 K/UL (ref 0–1.3)
MONOCYTES NFR BLD: 12.8 %
NEUTROPHILS # BLD: 7.1 K/UL (ref 1.7–7.7)
NEUTROPHILS NFR BLD: 79.4 %
PLATELET # BLD AUTO: 192 K/UL (ref 135–450)
PMV BLD AUTO: 8.6 FL (ref 5–10.5)
POTASSIUM SERPL-SCNC: 4.3 MMOL/L (ref 3.5–5.1)
RBC # BLD AUTO: 5.07 M/UL (ref 4.2–5.9)
REASON FOR REJECTION: NORMAL
REJECTED TEST: NORMAL
SODIUM SERPL-SCNC: 135 MMOL/L (ref 136–145)
WBC # BLD AUTO: 8.9 K/UL (ref 4–11)

## 2025-02-06 PROCEDURE — 84132 ASSAY OF SERUM POTASSIUM: CPT

## 2025-02-06 PROCEDURE — 99232 SBSQ HOSP IP/OBS MODERATE 35: CPT | Performed by: INTERNAL MEDICINE

## 2025-02-06 PROCEDURE — 6370000000 HC RX 637 (ALT 250 FOR IP): Performed by: PEDIATRICS

## 2025-02-06 PROCEDURE — 6370000000 HC RX 637 (ALT 250 FOR IP): Performed by: INTERNAL MEDICINE

## 2025-02-06 PROCEDURE — 6370000000 HC RX 637 (ALT 250 FOR IP)

## 2025-02-06 PROCEDURE — 80048 BASIC METABOLIC PNL TOTAL CA: CPT

## 2025-02-06 PROCEDURE — 2500000003 HC RX 250 WO HCPCS: Performed by: PEDIATRICS

## 2025-02-06 PROCEDURE — 36415 COLL VENOUS BLD VENIPUNCTURE: CPT

## 2025-02-06 PROCEDURE — 94761 N-INVAS EAR/PLS OXIMETRY MLT: CPT

## 2025-02-06 PROCEDURE — 94640 AIRWAY INHALATION TREATMENT: CPT

## 2025-02-06 PROCEDURE — 85025 COMPLETE CBC W/AUTO DIFF WBC: CPT

## 2025-02-06 PROCEDURE — 2060000000 HC ICU INTERMEDIATE R&B

## 2025-02-06 PROCEDURE — 2700000000 HC OXYGEN THERAPY PER DAY

## 2025-02-06 RX ORDER — FUROSEMIDE 20 MG/1
20 TABLET ORAL DAILY
Status: DISCONTINUED | OUTPATIENT
Start: 2025-02-06 | End: 2025-02-08 | Stop reason: HOSPADM

## 2025-02-06 RX ADMIN — BENZONATATE 200 MG: 100 CAPSULE ORAL at 20:59

## 2025-02-06 RX ADMIN — Medication 1 TABLET: at 09:27

## 2025-02-06 RX ADMIN — ALBUTEROL SULFATE 2 PUFF: 90 AEROSOL, METERED RESPIRATORY (INHALATION) at 07:32

## 2025-02-06 RX ADMIN — OSELTAMIVIR PHOSPHATE 75 MG: 75 CAPSULE ORAL at 09:27

## 2025-02-06 RX ADMIN — Medication 10 ML: at 09:30

## 2025-02-06 RX ADMIN — Medication 100 MG: at 09:27

## 2025-02-06 RX ADMIN — ATORVASTATIN CALCIUM 40 MG: 40 TABLET, FILM COATED ORAL at 21:00

## 2025-02-06 RX ADMIN — PREDNISONE 40 MG: 20 TABLET ORAL at 09:27

## 2025-02-06 RX ADMIN — THERA TABS 1 TABLET: TAB at 09:26

## 2025-02-06 RX ADMIN — Medication 10 ML: at 21:00

## 2025-02-06 RX ADMIN — METOPROLOL SUCCINATE 200 MG: 50 TABLET, EXTENDED RELEASE ORAL at 09:26

## 2025-02-06 RX ADMIN — FLUTICASONE PROPIONATE 2 SPRAY: 50 SPRAY, METERED NASAL at 09:28

## 2025-02-06 RX ADMIN — ALBUTEROL SULFATE 2 PUFF: 90 AEROSOL, METERED RESPIRATORY (INHALATION) at 20:38

## 2025-02-06 RX ADMIN — SACUBITRIL AND VALSARTAN 1 TABLET: 24; 26 TABLET, FILM COATED ORAL at 09:27

## 2025-02-06 RX ADMIN — FUROSEMIDE 20 MG: 20 TABLET ORAL at 09:26

## 2025-02-06 RX ADMIN — RIVAROXABAN 20 MG: 20 TABLET, FILM COATED ORAL at 09:27

## 2025-02-06 RX ADMIN — FOLIC ACID 1 MG: 1 TABLET ORAL at 09:27

## 2025-02-06 RX ADMIN — SACUBITRIL AND VALSARTAN 1 TABLET: 24; 26 TABLET, FILM COATED ORAL at 20:59

## 2025-02-06 NOTE — PROGRESS NOTES
Vitals:    02/06/25 0350   BP: 130/70   Pulse: 98   Resp: 18   Temp: 98 °F (36.7 °C)   SpO2: 100%     Patient is asleep but easy to wake up.  Call light within reach.

## 2025-02-06 NOTE — FLOWSHEET NOTE
02/05/25 1916   Vital Signs   Temp 97.9 °F (36.6 °C)   Temp Source Oral   Pulse 99   Heart Rate Source Monitor   Respirations 16   /65   MAP (Calculated) 82   BP Location Left upper arm   BP Method Automatic   Patient Position Semi fowlers   Pain Assessment   Pain Assessment None - Denies Pain   Opioid-Induced Sedation   POSS Score 1   RASS   Simpson Agitation Sedation Scale (RASS) 0   Oxygen Therapy   SpO2 100 %   O2 Device Nasal cannula   O2 Flow Rate (L/min) 3 L/min     Assessment done and in flowsheets.  Patient is alert and oriented x4.  Call light within reach.

## 2025-02-06 NOTE — PLAN OF CARE
Problem: Chronic Conditions and Co-morbidities  Goal: Patient's chronic conditions and co-morbidity symptoms are monitored and maintained or improved  2/6/2025 0132 by Grace Benoit, RN  Outcome: Progressing  Flowsheets (Taken 2/6/2025 0132)  Care Plan - Patient's Chronic Conditions and Co-Morbidity Symptoms are Monitored and Maintained or Improved: Monitor and assess patient's chronic conditions and comorbid symptoms for stability, deterioration, or improvement  2/5/2025 1438 by Vidhya Stoddard, RN  Outcome: Progressing     Problem: Discharge Planning  Goal: Discharge to home or other facility with appropriate resources  Outcome: Progressing  Flowsheets (Taken 2/6/2025 0132)  Discharge to home or other facility with appropriate resources: Identify barriers to discharge with patient and caregiver     Problem: Safety - Adult  Goal: Free from fall injury  Outcome: Progressing  Flowsheets (Taken 2/6/2025 0132)  Free From Fall Injury: Instruct family/caregiver on patient safety     Problem: Respiratory - Adult  Goal: Achieves optimal ventilation and oxygenation  Outcome: Progressing  Flowsheets (Taken 2/6/2025 0132)  Achieves optimal ventilation and oxygenation:   Assess for changes in respiratory status   Assess for changes in mentation and behavior   Position to facilitate oxygenation and minimize respiratory effort

## 2025-02-06 NOTE — PLAN OF CARE
Problem: Chronic Conditions and Co-morbidities  Goal: Patient's chronic conditions and co-morbidity symptoms are monitored and maintained or improved  2/6/2025 1048 by Vidhya Stoddard RN  Outcome: Progressing

## 2025-02-07 LAB
ANION GAP SERPL CALCULATED.3IONS-SCNC: 10 MMOL/L (ref 3–16)
ANION GAP SERPL CALCULATED.3IONS-SCNC: 7 MMOL/L (ref 3–16)
ANISOCYTOSIS BLD QL SMEAR: ABNORMAL
BASOPHILS # BLD: 0 K/UL (ref 0–0.2)
BASOPHILS # BLD: 0 K/UL (ref 0–0.2)
BASOPHILS NFR BLD: 0 %
BASOPHILS NFR BLD: 0.1 %
BUN SERPL-MCNC: 26 MG/DL (ref 7–20)
BUN SERPL-MCNC: 28 MG/DL (ref 7–20)
CALCIUM SERPL-MCNC: 7.6 MG/DL (ref 8.3–10.6)
CALCIUM SERPL-MCNC: 7.8 MG/DL (ref 8.3–10.6)
CHLORIDE SERPL-SCNC: 96 MMOL/L (ref 99–110)
CHLORIDE SERPL-SCNC: 98 MMOL/L (ref 99–110)
CO2 SERPL-SCNC: 24 MMOL/L (ref 21–32)
CO2 SERPL-SCNC: 34 MMOL/L (ref 21–32)
CREAT SERPL-MCNC: 0.8 MG/DL (ref 0.8–1.3)
CREAT SERPL-MCNC: 0.8 MG/DL (ref 0.8–1.3)
DEPRECATED RDW RBC AUTO: 13.1 % (ref 12.4–15.4)
DEPRECATED RDW RBC AUTO: 13.5 % (ref 12.4–15.4)
EOSINOPHIL # BLD: 0 K/UL (ref 0–0.6)
EOSINOPHIL # BLD: 0 K/UL (ref 0–0.6)
EOSINOPHIL NFR BLD: 0 %
EOSINOPHIL NFR BLD: 0 %
GFR SERPLBLD CREATININE-BSD FMLA CKD-EPI: >90 ML/MIN/{1.73_M2}
GFR SERPLBLD CREATININE-BSD FMLA CKD-EPI: >90 ML/MIN/{1.73_M2}
GLUCOSE SERPL-MCNC: 179 MG/DL (ref 70–99)
GLUCOSE SERPL-MCNC: 84 MG/DL (ref 70–99)
HCT VFR BLD AUTO: 48.8 % (ref 40.5–52.5)
HCT VFR BLD AUTO: 52 % (ref 40.5–52.5)
HGB BLD-MCNC: 16.4 G/DL (ref 13.5–17.5)
HGB BLD-MCNC: 16.9 G/DL (ref 13.5–17.5)
LYMPHOCYTES # BLD: 0.6 K/UL (ref 1–5.1)
LYMPHOCYTES # BLD: 1 K/UL (ref 1–5.1)
LYMPHOCYTES NFR BLD: 9 %
LYMPHOCYTES NFR BLD: 9.1 %
MAGNESIUM SERPL-MCNC: 1.39 MG/DL (ref 1.8–2.4)
MAGNESIUM SERPL-MCNC: 2.72 MG/DL (ref 1.8–2.4)
MCH RBC QN AUTO: 31.7 PG (ref 26–34)
MCH RBC QN AUTO: 31.9 PG (ref 26–34)
MCHC RBC AUTO-ENTMCNC: 32.4 G/DL (ref 31–36)
MCHC RBC AUTO-ENTMCNC: 33.7 G/DL (ref 31–36)
MCV RBC AUTO: 94.9 FL (ref 80–100)
MCV RBC AUTO: 97.9 FL (ref 80–100)
MONOCYTES # BLD: 0.5 K/UL (ref 0–1.3)
MONOCYTES # BLD: 1.3 K/UL (ref 0–1.3)
MONOCYTES NFR BLD: 12 %
MONOCYTES NFR BLD: 7.4 %
NEUTROPHILS # BLD: 5.7 K/UL (ref 1.7–7.7)
NEUTROPHILS # BLD: 8.5 K/UL (ref 1.7–7.7)
NEUTROPHILS NFR BLD: 78 %
NEUTROPHILS NFR BLD: 83.4 %
NEUTS BAND NFR BLD MANUAL: 1 % (ref 0–7)
NRBC BLD-RTO: 1 /100 WBC
PLATELET # BLD AUTO: 173 K/UL (ref 135–450)
PLATELET # BLD AUTO: 211 K/UL (ref 135–450)
PLATELET BLD QL SMEAR: ADEQUATE
PMV BLD AUTO: 8.5 FL (ref 5–10.5)
PMV BLD AUTO: 8.9 FL (ref 5–10.5)
POIKILOCYTOSIS BLD QL SMEAR: ABNORMAL
POTASSIUM SERPL-SCNC: 4.1 MMOL/L (ref 3.5–5.1)
POTASSIUM SERPL-SCNC: 4.6 MMOL/L (ref 3.5–5.1)
RBC # BLD AUTO: 5.14 M/UL (ref 4.2–5.9)
RBC # BLD AUTO: 5.31 M/UL (ref 4.2–5.9)
SLIDE REVIEW: ABNORMAL
SODIUM SERPL-SCNC: 130 MMOL/L (ref 136–145)
SODIUM SERPL-SCNC: 139 MMOL/L (ref 136–145)
WBC # BLD AUTO: 10.8 K/UL (ref 4–11)
WBC # BLD AUTO: 6.9 K/UL (ref 4–11)

## 2025-02-07 PROCEDURE — 94640 AIRWAY INHALATION TREATMENT: CPT

## 2025-02-07 PROCEDURE — 6370000000 HC RX 637 (ALT 250 FOR IP): Performed by: INTERNAL MEDICINE

## 2025-02-07 PROCEDURE — 6370000000 HC RX 637 (ALT 250 FOR IP)

## 2025-02-07 PROCEDURE — 94761 N-INVAS EAR/PLS OXIMETRY MLT: CPT

## 2025-02-07 PROCEDURE — 2700000000 HC OXYGEN THERAPY PER DAY

## 2025-02-07 PROCEDURE — 80048 BASIC METABOLIC PNL TOTAL CA: CPT

## 2025-02-07 PROCEDURE — 2060000000 HC ICU INTERMEDIATE R&B

## 2025-02-07 PROCEDURE — 6360000002 HC RX W HCPCS: Performed by: PEDIATRICS

## 2025-02-07 PROCEDURE — 36415 COLL VENOUS BLD VENIPUNCTURE: CPT

## 2025-02-07 PROCEDURE — 2500000003 HC RX 250 WO HCPCS: Performed by: PEDIATRICS

## 2025-02-07 PROCEDURE — 99232 SBSQ HOSP IP/OBS MODERATE 35: CPT | Performed by: INTERNAL MEDICINE

## 2025-02-07 PROCEDURE — 6370000000 HC RX 637 (ALT 250 FOR IP): Performed by: PEDIATRICS

## 2025-02-07 PROCEDURE — 85025 COMPLETE CBC W/AUTO DIFF WBC: CPT

## 2025-02-07 PROCEDURE — 83735 ASSAY OF MAGNESIUM: CPT

## 2025-02-07 RX ORDER — FOLIC ACID 1 MG/1
1 TABLET ORAL DAILY
Qty: 30 TABLET | Refills: 1 | Status: SHIPPED | OUTPATIENT
Start: 2025-02-08

## 2025-02-07 RX ORDER — PREDNISONE 10 MG/1
TABLET ORAL
Qty: 18 TABLET | Refills: 0 | Status: SHIPPED | OUTPATIENT
Start: 2025-02-08 | End: 2025-02-17

## 2025-02-07 RX ORDER — LANOLIN ALCOHOL/MO/W.PET/CERES
100 CREAM (GRAM) TOPICAL DAILY
Qty: 30 TABLET | Refills: 1 | Status: SHIPPED | OUTPATIENT
Start: 2025-02-08

## 2025-02-07 RX ORDER — NICOTINE 21 MG/24HR
1 PATCH, TRANSDERMAL 24 HOURS TRANSDERMAL DAILY
Qty: 30 PATCH | Refills: 1 | Status: SHIPPED | OUTPATIENT
Start: 2025-02-08

## 2025-02-07 RX ORDER — BENZONATATE 200 MG/1
200 CAPSULE ORAL 3 TIMES DAILY PRN
Qty: 20 CAPSULE | Refills: 0 | Status: SHIPPED | OUTPATIENT
Start: 2025-02-07 | End: 2025-02-14

## 2025-02-07 RX ADMIN — MAGNESIUM SULFATE HEPTAHYDRATE 2000 MG: 40 INJECTION, SOLUTION INTRAVENOUS at 15:51

## 2025-02-07 RX ADMIN — ALBUTEROL SULFATE 2 PUFF: 90 AEROSOL, METERED RESPIRATORY (INHALATION) at 20:08

## 2025-02-07 RX ADMIN — MAGNESIUM SULFATE HEPTAHYDRATE 2000 MG: 40 INJECTION, SOLUTION INTRAVENOUS at 17:30

## 2025-02-07 RX ADMIN — ALBUTEROL SULFATE 2 PUFF: 90 AEROSOL, METERED RESPIRATORY (INHALATION) at 06:44

## 2025-02-07 RX ADMIN — FUROSEMIDE 20 MG: 20 TABLET ORAL at 12:32

## 2025-02-07 RX ADMIN — Medication 10 ML: at 21:10

## 2025-02-07 RX ADMIN — Medication 1 TABLET: at 10:31

## 2025-02-07 RX ADMIN — FLUTICASONE PROPIONATE 2 SPRAY: 50 SPRAY, METERED NASAL at 10:36

## 2025-02-07 RX ADMIN — METOPROLOL SUCCINATE 200 MG: 50 TABLET, EXTENDED RELEASE ORAL at 12:32

## 2025-02-07 RX ADMIN — Medication 10 ML: at 10:36

## 2025-02-07 RX ADMIN — SACUBITRIL AND VALSARTAN 1 TABLET: 24; 26 TABLET, FILM COATED ORAL at 21:10

## 2025-02-07 RX ADMIN — FOLIC ACID 1 MG: 1 TABLET ORAL at 10:31

## 2025-02-07 RX ADMIN — RIVAROXABAN 20 MG: 20 TABLET, FILM COATED ORAL at 10:31

## 2025-02-07 RX ADMIN — Medication 100 MG: at 10:31

## 2025-02-07 RX ADMIN — ATORVASTATIN CALCIUM 40 MG: 40 TABLET, FILM COATED ORAL at 21:10

## 2025-02-07 RX ADMIN — PREDNISONE 40 MG: 20 TABLET ORAL at 10:31

## 2025-02-07 RX ADMIN — ACETAMINOPHEN 650 MG: 325 TABLET ORAL at 04:36

## 2025-02-07 ASSESSMENT — PAIN SCALES - GENERAL
PAINLEVEL_OUTOF10: 2
PAINLEVEL_OUTOF10: 5

## 2025-02-07 ASSESSMENT — PAIN - FUNCTIONAL ASSESSMENT: PAIN_FUNCTIONAL_ASSESSMENT: ACTIVITIES ARE NOT PREVENTED

## 2025-02-07 ASSESSMENT — PAIN DESCRIPTION - PAIN TYPE: TYPE: ACUTE PAIN

## 2025-02-07 ASSESSMENT — PAIN DESCRIPTION - FREQUENCY: FREQUENCY: INTERMITTENT

## 2025-02-07 ASSESSMENT — PAIN SCALES - WONG BAKER: WONGBAKER_NUMERICALRESPONSE: HURTS A LITTLE BIT

## 2025-02-07 ASSESSMENT — PAIN DESCRIPTION - DESCRIPTORS: DESCRIPTORS: ACHING;DULL

## 2025-02-07 ASSESSMENT — PAIN DESCRIPTION - LOCATION: LOCATION: HEAD

## 2025-02-07 ASSESSMENT — PAIN DESCRIPTION - ORIENTATION: ORIENTATION: UPPER

## 2025-02-07 ASSESSMENT — PAIN DESCRIPTION - ONSET: ONSET: SUDDEN

## 2025-02-07 NOTE — PROGRESS NOTES
Progress Note    Admit Date:  2/1/2025    COPD   Influenza A +  Atrial fibrillation with RVR     Subjective:  Mr. Lozano today is feeling better. Mg low.     Objective:   No data found.         Intake/Output Summary (Last 24 hours) at 2/7/2025 1557  Last data filed at 2/7/2025 1439  Gross per 24 hour   Intake 480 ml   Output 1800 ml   Net -1320 ml       Physical Exam:    Gen: No distress. Alert. +Elderly male, chronically ill appearing   Eyes: PERRL. No sclera icterus. No conjunctival injection.   ENT: No discharge. Pharynx clear.   Neck: No JVD.  Trachea midline.  Resp: No accessory muscle use. No crackles. No wheezes. No rhonchi. Breath sounds clear.   CV: Regular rate. Regular rhythm. No murmur.  No rub. No edema.   Peripheral Pulses: +2 palpable, equal bilaterally   GI: Non-tender. Non-distended.  Normal bowel sounds.  Skin: Warm and dry. No nodule on exposed extremities. No rash on exposed extremities. +onychomycosis bilaterally   M/S: No cyanosis. No joint deformity. No clubbing.   Neuro: Awake. Grossly nonfocal    Psych: Oriented x 3. No anxiety +agitation.         Medications:  furosemide, 20 mg, Daily  predniSONE, 40 mg, Daily  atorvastatin, 40 mg, Nightly  fluticasone, 2 spray, Daily  metoprolol succinate, 200 mg, Daily  therapeutic multivitamin-minerals, 1 tablet, Daily  rivaroxaban, 20 mg, Daily with breakfast  albuterol sulfate HFA, 2 puff, BID RT  sodium chloride flush, 5-40 mL, 2 times per day  thiamine, 100 mg, Daily  multivitamin, 1 tablet, Daily  folic acid, 1 mg, Daily  nicotine, 1 patch, Daily  sacubitril-valsartan, 1 tablet, BID      PRN Medications:  albuterol sulfate HFA, 2 puff, Q4H PRN  benzonatate, 200 mg, TID PRN  sodium chloride flush, 5-40 mL, PRN  sodium chloride, , PRN  potassium chloride, 40 mEq, PRN   Or  potassium alternative oral replacement, 40 mEq, PRN   Or  potassium chloride, 10 mEq, PRN  magnesium sulfate, 2,000 mg, PRN  polyethylene glycol, 17 g, Daily PRN  acetaminophen,  Swab Updated: 02/01/25 1931     SARS-CoV-2 RNA, RT PCR NOT DETECTED     Comment: Not Detected results do not preclude SARS-CoV-2 infection and  should not be used as the sole basis for patient management  decisions.  Results must be combined with clinical observations,  patient history, and epidemiological information.  Testing was performed using REANNA DARRON SARS-CoV-2, Influenza A/B  and Respiratory Syncytial Virus nucleic acid assay. This  test is a multiplex Real-Time Reverse Transcriptase Polymerase Chain  Reaction (RT-PCR)-based in vitro diagnostic test intended for the  qualitative detection of nucleic acids from SARS-CoV-2,  influenza A,influenza B and Respiratory Syncytial Virus  in nasopharyngeal and nasal swab specimens.    Patient Fact Sheet:  https://www.fda.gov/media/733638/download  Provider Fact Sheet: https://www.fda.gov/media/527321/download  EUA: https://www.fda.gov/media/251971/download  IFU: https://www.fda.gov/media/544718/download    Methodology:  RT-PCR          Influenza A DETECTED     Influenza B NOT DETECTED    RSV Detection [[id number]] Collected: 02/01/25 1806    Order Status: Completed Specimen: Nasopharyngeal Swab Updated: 02/01/25 1951     RSV Rapid Ag Negative    COVID-19, Flu A/B, and RSV Combo [[id number]]     Order Status: Canceled Specimen: Nasopharyngeal                RADIOLOGY  CT CHEST PULMONARY EMBOLISM W CONTRAST   Final Result   1. No evidence of pulmonary embolism or acute pulmonary abnormality.   2. Diffuse centrilobular emphysematous changes throughout both lungs.   3. Dilated main pulmonary artery measuring up to 3.7 cm, which can be seen   the setting of pulmonary artery hypertension.   4. Stable right upper lobe pulmonary nodules.  Stable 1.8 cm right adrenal   nodule.  No routine follow-up is necessary.         XR CHEST PORTABLE   Final Result   Cardiomegaly with increased cardiac size raising the possibility of   underlying pericardial effusion.      Small bilateral

## 2025-02-07 NOTE — DISCHARGE INSTR - COC
[facility]}  Other Medical Equipment (for information only, NOT a DME order):  {EQUIPMENT:196302401}  Other Treatments: ***    Patient's personal belongings (please select all that are sent with patient):  {CHP DME Belongings:042024954}    RN SIGNATURE:  {Esignature:[id number]}    CASE MANAGEMENT/SOCIAL WORK SECTION    Inpatient Status Date: ***    Readmission Risk Assessment Score:  [facility] RISK OF UNPLANNED READMISSION 2.0             11.3 Total Score        Discharging to Facility/ Agency   Name:   Address:  Phone:  Fax:    Dialysis Facility (if applicable)   Name:  Address:  Dialysis Schedule:  Phone:  Fax:    / signature: {Esignature:963842462}    PHYSICIAN SECTION    Prognosis: {Prognosis:5981420618}    Condition at Discharge: { Patient Condition:431669864}    Rehab Potential (if transferring to Rehab): {Prognosis:2996017787}    Recommended Labs or Other Treatments After Discharge: ***    Physician Certification: I certify the above information and transfer of Severino Lozano  is necessary for the continuing treatment of the diagnosis listed and that he requires {Admit to Appropriate Level of Care:17562} for {GREATER/LESS:156624591} 30 days.     Update Admission H&P: {CHP DME Changes in HandP:828562603}    PHYSICIAN SIGNATURE:  {Esignature:837211172}

## 2025-02-07 NOTE — PROGRESS NOTES
02/06/25 2000   RT Protocol   History Pulmonary Disease 2   Respiratory pattern 0   Breath sounds 2   Cough 0   Indications for Bronchodilator Therapy Decreased or absent breath sounds   Bronchodilator Assessment Score 4     RT Inhaler-Nebulizer Bronchodilator Protocol Note    There is a bronchodilator order in the chart from a provider indicating to follow the RT Bronchodilator Protocol and there is an “Initiate RT Inhaler-Nebulizer Bronchodilator Protocol” order as well (see protocol at bottom of note).    CXR Findings:  No results found.    The findings from the last RT Protocol Assessment were as follows:   History Pulmonary Disease: Chronic pulmonary disease  Respiratory Pattern: Regular pattern and RR 12-20 bpm  Breath Sounds: Slightly diminished and/or crackles  Cough: Strong, spontaneous, non-productive  Indication for Bronchodilator Therapy: Decreased or absent breath sounds  Bronchodilator Assessment Score: 4    Aerosolized bronchodilator medication orders have been revised according to the RT Inhaler-Nebulizer Bronchodilator Protocol below.    Respiratory Therapist to perform RT Therapy Protocol Assessment initially then follow the protocol.  Repeat RT Therapy Protocol Assessment PRN for score 0-3 or on second treatment, BID, and PRN for scores above 3.    No Indications - adjust the frequency to every 6 hours PRN wheezing or bronchospasm, if no treatments needed after 48 hours then discontinue using Per Protocol order mode.     If indication present, adjust the RT bronchodilator orders based on the Bronchodilator Assessment Score as indicated below.  Use Inhaler orders unless patient has one or more of the following: on home nebulizer, not able to hold breath for 10 seconds, is not alert and oriented, cannot activate and use MDI correctly, or respiratory rate 25 breaths per minute or more, then use the equivalent nebulizer order(s) with same Frequency and PRN reasons based on the score.  If a patient  is on this medication at home then do not decrease Frequency below that used at home.    0-3 - enter or revise RT bronchodilator order(s) to equivalent RT Bronchodilator order with Frequency of every 4 hours PRN for wheezing or increased work of breathing using Per Protocol order mode.        4-6 - enter or revise RT Bronchodilator order(s) to two equivalent RT bronchodilator orders with one order with BID Frequency and one order with Frequency of every 4 hours PRN wheezing or increased work of breathing using Per Protocol order mode.        7-10 - enter or revise RT Bronchodilator order(s) to two equivalent RT bronchodilator orders with one order with TID Frequency and one order with Frequency of every 4 hours PRN wheezing or increased work of breathing using Per Protocol order mode.       11-13 - enter or revise RT Bronchodilator order(s) to one equivalent RT bronchodilator order with QID Frequency and an Albuterol order with Frequency of every 4 hours PRN wheezing or increased work of breathing using Per Protocol order mode.      Greater than 13 - enter or revise RT Bronchodilator order(s) to one equivalent RT bronchodilator order with every 4 hours Frequency and an Albuterol order with Frequency of every 2 hours PRN wheezing or increased work of breathing using Per Protocol order mode.     RT to enter RT Home Evaluation for COPD & MDI Assessment order using Per Protocol order mode.    Electronically signed by Vaughn Yusuf RCP on 2/6/2025 at 8:45 PM

## 2025-02-07 NOTE — PROGRESS NOTES
Pt's mag returned at 1.39. PRN replacement initiated. Dr Shields is aware and order to d/c discontinued. Pt and family aware that he will d/c tomorrow.    Sister in law [place] states that she will pick pt up tomorrow just let her know when.

## 2025-02-07 NOTE — PLAN OF CARE
Problem: Chronic Conditions and Co-morbidities  Goal: Patient's chronic conditions and co-morbidity symptoms are monitored and maintained or improved  Outcome: Progressing  Flowsheets (Taken 2/7/2025 0134)  Care Plan - Patient's Chronic Conditions and Co-Morbidity Symptoms are Monitored and Maintained or Improved: Monitor and assess patient's chronic conditions and comorbid symptoms for stability, deterioration, or improvement     Problem: Safety - Adult  Goal: Free from fall injury  Outcome: Progressing  Flowsheets (Taken 2/7/2025 0134)  Free From Fall Injury: Instruct family/caregiver on patient safety     Problem: Respiratory - Adult  Goal: Achieves optimal ventilation and oxygenation  Outcome: Progressing  Flowsheets (Taken 2/7/2025 0134)  Achieves optimal ventilation and oxygenation:   Assess for changes in respiratory status   Assess for changes in mentation and behavior   Position to facilitate oxygenation and minimize respiratory effort

## 2025-02-07 NOTE — DISCHARGE SUMMARY
Hospital Medicine Discharge Summary    Patient: Severino Lozano     Gender: male  : 1955   Age: 70 y.o.  MRN: [id number]    Admitting Physician: Mushtaq Sanchez MD  Discharge Physician: Iza Shields DO    Code Status: Full Code     Admit Date: 2025   Discharge Date:  2025     Discharge Diagnoses:    Active Hospital Problems    Diagnosis Date Noted    Elevated troponin [R79.89] 2025    Influenza A [J10.1] 2025    Alcohol abuse [F10.10] 2025    Atrial fibrillation with rapid ventricular response (HCC) [I48.91] 2025    COPD exacerbation (HCC) [J44.1] 2024     Condition at Discharge: Stable    Hospital Course:     Atrial fibrillation with rapid ventricular response, hx of cardiomyopathy, hx of biventricular heart failure, hx of CAD:   Pulmonary HTN   - cardiology consulted  - telemetry   - aspirin   - entresto 24-26 mg po BID   - metoprolol 200 mg po daily   - atorvastatin 40 mg po qhs   - furosemide 20 mg po daily resumed  - rivaroxaban 20 mg po daily   - daily weights   - IOs   - HR stable      Influenza A, chronic respiratory failure with hypoxia, COPD with acute exacerbation:   Wears 2-3 L O2, stable on this   - methylprednisolone 40 mg IV BID   - duonebs q4 hrs WA   - oseltamivir 75 mg po BID-->completed  - trelegy at home   --> transition to PO prednisone today      Alcohol abuse:   - CIWA procotol with oral ativan   - encouraged to quit drinking but he doesn't want to give it up  - MV po daily, thiamine, folate   - monitor  --> ok to stop ativan, has not been requiring it     Hypomagnesemia  -replace, has to give further replacement, dc with oral replacement     HTN:   - as noted above     Diarrhea  -Patient reports is chronic intermittent  -Monitor   -could also be exacerbated by influenza       Additional findings or notes to primary provider:  None at this time    Discharge Medications:   Current Discharge Medication List        START taking these  day of discharge.Time Spent on discharge is less than 30 minutes in the examination, evaluation, counseling and review of medications and discharge plan.          Signed:    Iza hSields DO   2/8/2025      Thank you Amanda Rosales APRN - [facility] for the opportunity to be involved in this patient's care. If you have any questions or concerns please feel free to contact me at [employer].

## 2025-02-07 NOTE — PROGRESS NOTES
02/06/25 2000   RT Protocol   History Pulmonary Disease 2   Respiratory pattern 0   Breath sounds 2   Cough 0   Indications for Bronchodilator Therapy Decreased or absent breath sounds   Bronchodilator Assessment Score 4     RT Inhaler-Nebulizer Bronchodilator Protocol Note    There is a bronchodilator order in the chart from a provider indicating to follow the RT Bronchodilator Protocol and there is an “Initiate RT Inhaler-Nebulizer Bronchodilator Protocol” order as well (see protocol at bottom of note).    CXR Findings:  No results found.    The findings from the last RT Protocol Assessment were as follows:   History Pulmonary Disease: Chronic pulmonary disease  Respiratory Pattern: Regular pattern and RR 12-20 bpm  Breath Sounds: Slightly diminished and/or crackles  Cough: Strong, spontaneous, non-productive  Indication for Bronchodilator Therapy: Decreased or absent breath sounds  Bronchodilator Assessment Score: 4    Aerosolized bronchodilator medication orders have been revised according to the RT Inhaler-Nebulizer Bronchodilator Protocol below.    Respiratory Therapist to perform RT Therapy Protocol Assessment initially then follow the protocol.  Repeat RT Therapy Protocol Assessment PRN for score 0-3 or on second treatment, BID, and PRN for scores above 3.    No Indications - adjust the frequency to every 6 hours PRN wheezing or bronchospasm, if no treatments needed after 48 hours then discontinue using Per Protocol order mode.     If indication present, adjust the RT bronchodilator orders based on the Bronchodilator Assessment Score as indicated below.  Use Inhaler orders unless patient has one or more of the following: on home nebulizer, not able to hold breath for 10 seconds, is not alert and oriented, cannot activate and use MDI correctly, or respiratory rate 25 breaths per minute or more, then use the equivalent nebulizer order(s) with same Frequency and PRN reasons based on the score.  If a patient  is on this medication at home then do not decrease Frequency below that used at home.    0-3 - enter or revise RT bronchodilator order(s) to equivalent RT Bronchodilator order with Frequency of every 4 hours PRN for wheezing or increased work of breathing using Per Protocol order mode.        4-6 - enter or revise RT Bronchodilator order(s) to two equivalent RT bronchodilator orders with one order with BID Frequency and one order with Frequency of every 4 hours PRN wheezing or increased work of breathing using Per Protocol order mode.        7-10 - enter or revise RT Bronchodilator order(s) to two equivalent RT bronchodilator orders with one order with TID Frequency and one order with Frequency of every 4 hours PRN wheezing or increased work of breathing using Per Protocol order mode.       11-13 - enter or revise RT Bronchodilator order(s) to one equivalent RT bronchodilator order with QID Frequency and an Albuterol order with Frequency of every 4 hours PRN wheezing or increased work of breathing using Per Protocol order mode.      Greater than 13 - enter or revise RT Bronchodilator order(s) to one equivalent RT bronchodilator order with every 4 hours Frequency and an Albuterol order with Frequency of every 2 hours PRN wheezing or increased work of breathing using Per Protocol order mode.       Electronically signed by Vaughn Yusuf RCP on 2/6/2025 at 8:45 PM

## 2025-02-07 NOTE — FLOWSHEET NOTE
02/06/25 1925   Vital Signs   Temp 97.9 °F (36.6 °C)   Temp Source Oral   Pulse 92   Heart Rate Source Monitor   Respirations 18   /82   MAP (Calculated) 100   BP Location Left upper arm   BP Method Automatic   Patient Position Semi fowlers   Opioid-Induced Sedation   POSS Score 1   RASS   Simpson Agitation Sedation Scale (RASS) 0   Oxygen Therapy   SpO2 99 %   O2 Device Nasal cannula   O2 Flow Rate (L/min) 2 L/min     Assessment done and in flowsheets.  Patient is alert and oriented x4.  O2 at 2L/min with normal breathing pattern.  Call light within reach.

## 2025-02-07 NOTE — PROGRESS NOTES
Progress Note    Admit Date:  2/1/2025    COPD   Influenza A +  Atrial fibrillation with RVR     Subjective:  Mr. Lozano today is seen resting in bed, says he sometimes still gets short of breath. He is also scared to go home because his caregiver cannot be there until tomorrow. He is adamant he will not go to a SNF.     Objective:   Patient Vitals for the past 4 hrs:   BP Temp Temp src Pulse Resp SpO2   02/06/25 1925 137/82 97.9 °F (36.6 °C) Oral 92 18 99 %   02/06/25 1700 132/71 98.3 °F (36.8 °C) Oral 96 20 98 %          Intake/Output Summary (Last 24 hours) at 2/6/2025 1940  Last data filed at 2/6/2025 0359  Gross per 24 hour   Intake --   Output 550 ml   Net -550 ml       Physical Exam:    Gen: No distress. Alert. +Elderly male, chronically ill appearing   Eyes: PERRL. No sclera icterus. No conjunctival injection.   ENT: No discharge. Pharynx clear.   Neck: No JVD.  Trachea midline.  Resp: No accessory muscle use. No crackles. No wheezes. No rhonchi. ++Diminished breath sounds   CV: Regular rate. Regular rhythm. No murmur.  No rub. No edema.   Peripheral Pulses: +2 palpable, equal bilaterally   GI: Non-tender. Non-distended.  Normal bowel sounds.  Skin: Warm and dry. No nodule on exposed extremities. No rash on exposed extremities. +onychomycosis bilaterally   M/S: No cyanosis. No joint deformity. No clubbing.   Neuro: Awake. Grossly nonfocal    Psych: Oriented x 3. No anxiety +agitation.         Medications:  furosemide, 20 mg, Daily  predniSONE, 40 mg, Daily  atorvastatin, 40 mg, Nightly  fluticasone, 2 spray, Daily  metoprolol succinate, 200 mg, Daily  therapeutic multivitamin-minerals, 1 tablet, Daily  rivaroxaban, 20 mg, Daily with breakfast  albuterol sulfate HFA, 2 puff, BID RT  sodium chloride flush, 5-40 mL, 2 times per day  thiamine, 100 mg, Daily  multivitamin, 1 tablet, Daily  folic acid, 1 mg, Daily  nicotine, 1 patch, Daily  sacubitril-valsartan, 1 tablet, BID      PRN Medications:  albuterol

## 2025-02-07 NOTE — CARE COORDINATION
DC order noted. Spoke to pt via phone. Spoke to pts caregiver via phone who states she is ready for pt to return home. She will provide transport home for pt after 1500 today. Caregiver, Tsering, will also bring pt's home O2 for transport. No additional DC or DME needs at this time.     IMM 2/7    O2 98% 2 liters baseline

## 2025-02-08 VITALS
SYSTOLIC BLOOD PRESSURE: 116 MMHG | HEIGHT: 69 IN | HEART RATE: 104 BPM | DIASTOLIC BLOOD PRESSURE: 79 MMHG | RESPIRATION RATE: 20 BRPM | OXYGEN SATURATION: 96 % | BODY MASS INDEX: 34.36 KG/M2 | WEIGHT: 232 LBS | TEMPERATURE: 98.1 F

## 2025-02-08 LAB
ANION GAP SERPL CALCULATED.3IONS-SCNC: 7 MMOL/L (ref 3–16)
BUN SERPL-MCNC: 23 MG/DL (ref 7–20)
CALCIUM SERPL-MCNC: 7.7 MG/DL (ref 8.3–10.6)
CHLORIDE SERPL-SCNC: 96 MMOL/L (ref 99–110)
CO2 SERPL-SCNC: 34 MMOL/L (ref 21–32)
CREAT SERPL-MCNC: 0.8 MG/DL (ref 0.8–1.3)
GFR SERPLBLD CREATININE-BSD FMLA CKD-EPI: >90 ML/MIN/{1.73_M2}
GLUCOSE SERPL-MCNC: 84 MG/DL (ref 70–99)
POTASSIUM SERPL-SCNC: 4.1 MMOL/L (ref 3.5–5.1)
SODIUM SERPL-SCNC: 137 MMOL/L (ref 136–145)

## 2025-02-08 PROCEDURE — 2500000003 HC RX 250 WO HCPCS: Performed by: PEDIATRICS

## 2025-02-08 PROCEDURE — 6370000000 HC RX 637 (ALT 250 FOR IP)

## 2025-02-08 PROCEDURE — 94640 AIRWAY INHALATION TREATMENT: CPT

## 2025-02-08 PROCEDURE — 80048 BASIC METABOLIC PNL TOTAL CA: CPT

## 2025-02-08 PROCEDURE — 99238 HOSP IP/OBS DSCHRG MGMT 30/<: CPT | Performed by: INTERNAL MEDICINE

## 2025-02-08 PROCEDURE — 36415 COLL VENOUS BLD VENIPUNCTURE: CPT

## 2025-02-08 PROCEDURE — 6370000000 HC RX 637 (ALT 250 FOR IP): Performed by: PEDIATRICS

## 2025-02-08 PROCEDURE — 6370000000 HC RX 637 (ALT 250 FOR IP): Performed by: INTERNAL MEDICINE

## 2025-02-08 PROCEDURE — 2700000000 HC OXYGEN THERAPY PER DAY

## 2025-02-08 PROCEDURE — 94761 N-INVAS EAR/PLS OXIMETRY MLT: CPT

## 2025-02-08 RX ORDER — LANOLIN ALCOHOL/MO/W.PET/CERES
400 CREAM (GRAM) TOPICAL DAILY
Qty: 30 TABLET | Refills: 1 | Status: SHIPPED | OUTPATIENT
Start: 2025-02-08

## 2025-02-08 RX ADMIN — Medication 10 ML: at 08:24

## 2025-02-08 RX ADMIN — ALBUTEROL SULFATE 2 PUFF: 90 AEROSOL, METERED RESPIRATORY (INHALATION) at 08:31

## 2025-02-08 RX ADMIN — THERA TABS 1 TABLET: TAB at 08:23

## 2025-02-08 RX ADMIN — FOLIC ACID 1 MG: 1 TABLET ORAL at 08:24

## 2025-02-08 RX ADMIN — SACUBITRIL AND VALSARTAN 1 TABLET: 24; 26 TABLET, FILM COATED ORAL at 08:23

## 2025-02-08 RX ADMIN — Medication 100 MG: at 08:23

## 2025-02-08 RX ADMIN — FUROSEMIDE 20 MG: 20 TABLET ORAL at 08:23

## 2025-02-08 RX ADMIN — PREDNISONE 40 MG: 20 TABLET ORAL at 08:22

## 2025-02-08 RX ADMIN — FLUTICASONE PROPIONATE 2 SPRAY: 50 SPRAY, METERED NASAL at 08:24

## 2025-02-08 RX ADMIN — METOPROLOL SUCCINATE 200 MG: 50 TABLET, EXTENDED RELEASE ORAL at 08:22

## 2025-02-08 RX ADMIN — RIVAROXABAN 20 MG: 20 TABLET, FILM COATED ORAL at 08:23

## 2025-02-08 RX ADMIN — Medication 1 TABLET: at 08:23

## 2025-02-08 NOTE — PROGRESS NOTES
/64   Pulse 98   Temp 98.1 °F (36.7 °C) (Oral)   Resp 16   Ht 1.753 m (5' 9\")   Wt 106.6 kg (235 lb 1.6 oz)   SpO2 99%   BMI 34.72 kg/m²     Patient alert and oriented resting in bed, watching tv. With telemetry. With O2 at 2  lpm via nasal cannula. Assessment completed. Respiration easy, even and unlabored. Patient tolerated night meds well. Call light and bedside table within reach. Bed at lowest position, locked, side rails x2, bed alarm on. Pt denies needs at this time.

## 2025-02-08 NOTE — PLAN OF CARE
Problem: Respiratory - Adult  Goal: Achieves optimal ventilation and oxygenation  Outcome: Progressing  Flowsheets (Taken 2/7/2025 2107)  Achieves optimal ventilation and oxygenation:   Assess for changes in respiratory status   Assess for changes in mentation and behavior   Position to facilitate oxygenation and minimize respiratory effort   Oxygen supplementation based on oxygen saturation or arterial blood gases   Assess and instruct to report shortness of breath or any respiratory difficulty     Problem: Cardiovascular - Adult  Goal: Maintains optimal cardiac output and hemodynamic stability  Outcome: Progressing  Flowsheets (Taken 2/7/2025 2107)  Maintains optimal cardiac output and hemodynamic stability:   Monitor blood pressure and heart rate   Monitor urine output and notify Licensed Independent Practitioner for values outside of normal range  Goal: Absence of cardiac dysrhythmias or at baseline  Outcome: Progressing  Flowsheets (Taken 2/7/2025 2107)  Absence of cardiac dysrhythmias or at baseline: Monitor cardiac rate and rhythm     Problem: Skin/Tissue Integrity - Adult  Goal: Skin integrity remains intact  Outcome: Progressing  Flowsheets (Taken 2/7/2025 2107)  Skin Integrity Remains Intact: Monitor for areas of redness and/or skin breakdown     Problem: Infection - Adult  Goal: Absence of infection at discharge  Outcome: Progressing  Flowsheets (Taken 2/7/2025 2107)  Absence of infection at discharge:   Assess and monitor for signs and symptoms of infection   Monitor lab/diagnostic results

## 2025-02-08 NOTE — PROGRESS NOTES
02/07/25 2000   RT Protocol   History Pulmonary Disease 2   Respiratory pattern 0   Breath sounds 2   Cough 0   Indications for Bronchodilator Therapy Decreased or absent breath sounds   Bronchodilator Assessment Score 4     RT Inhaler-Nebulizer Bronchodilator Protocol Note    There is a bronchodilator order in the chart from a provider indicating to follow the RT Bronchodilator Protocol and there is an “Initiate RT Inhaler-Nebulizer Bronchodilator Protocol” order as well (see protocol at bottom of note).    CXR Findings:  No results found.    The findings from the last RT Protocol Assessment were as follows:   History Pulmonary Disease: Chronic pulmonary disease  Respiratory Pattern: Regular pattern and RR 12-20 bpm  Breath Sounds: Slightly diminished and/or crackles  Cough: Strong, spontaneous, non-productive  Indication for Bronchodilator Therapy: Decreased or absent breath sounds  Bronchodilator Assessment Score: 4    Aerosolized bronchodilator medication orders have been revised according to the RT Inhaler-Nebulizer Bronchodilator Protocol below.    Respiratory Therapist to perform RT Therapy Protocol Assessment initially then follow the protocol.  Repeat RT Therapy Protocol Assessment PRN for score 0-3 or on second treatment, BID, and PRN for scores above 3.    No Indications - adjust the frequency to every 6 hours PRN wheezing or bronchospasm, if no treatments needed after 48 hours then discontinue using Per Protocol order mode.     If indication present, adjust the RT bronchodilator orders based on the Bronchodilator Assessment Score as indicated below.  Use Inhaler orders unless patient has one or more of the following: on home nebulizer, not able to hold breath for 10 seconds, is not alert and oriented, cannot activate and use MDI correctly, or respiratory rate 25 breaths per minute or more, then use the equivalent nebulizer order(s) with same Frequency and PRN reasons based on the score.  If a patient  is on this medication at home then do not decrease Frequency below that used at home.    0-3 - enter or revise RT bronchodilator order(s) to equivalent RT Bronchodilator order with Frequency of every 4 hours PRN for wheezing or increased work of breathing using Per Protocol order mode.        4-6 - enter or revise RT Bronchodilator order(s) to two equivalent RT bronchodilator orders with one order with BID Frequency and one order with Frequency of every 4 hours PRN wheezing or increased work of breathing using Per Protocol order mode.        7-10 - enter or revise RT Bronchodilator order(s) to two equivalent RT bronchodilator orders with one order with TID Frequency and one order with Frequency of every 4 hours PRN wheezing or increased work of breathing using Per Protocol order mode.       11-13 - enter or revise RT Bronchodilator order(s) to one equivalent RT bronchodilator order with QID Frequency and an Albuterol order with Frequency of every 4 hours PRN wheezing or increased work of breathing using Per Protocol order mode.      Greater than 13 - enter or revise RT Bronchodilator order(s) to one equivalent RT bronchodilator order with every 4 hours Frequency and an Albuterol order with Frequency of every 2 hours PRN wheezing or increased work of breathing using Per Protocol order mode.         Electronically signed by Vaughn Yusuf RCP on 2/7/2025 at 8:13 PM

## 2025-02-08 NOTE — PROGRESS NOTES
02/08/25 0800   RT Protocol   History Pulmonary Disease 2   Respiratory pattern 0   Breath sounds 2   Cough 0   Indications for Bronchodilator Therapy Decreased or absent breath sounds   Bronchodilator Assessment Score 4

## 2025-03-05 PROBLEM — J10.1 INFLUENZA A: Status: RESOLVED | Noted: 2025-02-03 | Resolved: 2025-03-05

## 2025-03-07 PROBLEM — R79.89 ELEVATED TROPONIN: Status: RESOLVED | Noted: 2025-02-05 | Resolved: 2025-03-07

## 2025-03-26 ENCOUNTER — TELEPHONE (OUTPATIENT)
Dept: PULMONOLOGY | Age: 70
End: 2025-03-26

## 2025-03-26 NOTE — TELEPHONE ENCOUNTER
Pt Sister called stating Pt needs orders sent supplies for the Nebulizer. [facility] is where they like the orders sent to.

## 2025-03-27 NOTE — TELEPHONE ENCOUNTER
Patient needs nebulizer supplies and he has appointment may 1st and this is first time seeing Dr. Will. Patient was a prior Dr. Watts patient and last seen him on 5-.

## 2025-03-28 DIAGNOSIS — J44.9 CHRONIC OBSTRUCTIVE PULMONARY DISEASE, UNSPECIFIED COPD TYPE (HCC): ICD-10-CM

## 2025-03-28 NOTE — TELEPHONE ENCOUNTER
Please sign if appropriate  PT request refill  Requested Prescriptions     Pending Prescriptions Disp Refills    ipratropium 0.5 mg-albuterol 2.5 mg (DUONEB) 0.5-2.5 (3) MG/3ML SOLN nebulizer solution 360 mL 5     Sig: Inhale 3 mLs into the lungs every 4 hours as needed for Shortness of Breath

## 2025-03-31 RX ORDER — IPRATROPIUM BROMIDE AND ALBUTEROL SULFATE 2.5; .5 MG/3ML; MG/3ML
3 SOLUTION RESPIRATORY (INHALATION) EVERY 4 HOURS PRN
Qty: 360 ML | Refills: 5 | Status: SHIPPED | OUTPATIENT
Start: 2025-03-31

## 2025-05-08 DIAGNOSIS — I42.9 CARDIOMYOPATHY, UNSPECIFIED TYPE (HCC): ICD-10-CM

## 2025-05-08 DIAGNOSIS — I10 ESSENTIAL HYPERTENSION: ICD-10-CM

## 2025-05-08 RX ORDER — SACUBITRIL AND VALSARTAN 24; 26 MG/1; MG/1
TABLET, FILM COATED ORAL
Qty: 180 TABLET | Refills: 0 | Status: SHIPPED | OUTPATIENT
Start: 2025-05-08

## 2025-05-08 NOTE — TELEPHONE ENCOUNTER
Last Office Visit: 08/06/24 Provider: DARYL  **Is provider OOT? No    Next Office Visit:  07/11/25 Provider: DARYL    Lab orders needed? no   Encounter provider correct? Yes If not, change provider  Script changes since last refill? no    LAST LABS:   BMP:  Lab Results   Component Value Date/Time     02/08/2025 08:13 AM    K 4.1 02/08/2025 08:13 AM    CL 96 02/08/2025 08:13 AM    CO2 34 02/08/2025 08:13 AM    BUN 23 02/08/2025 08:13 AM    CREATININE 0.8 02/08/2025 08:13 AM    GLUCOSE 84 02/08/2025 08:13 AM    CALCIUM 7.7 02/08/2025 08:13 AM    LABGLOM >90 02/08/2025 08:13 AM

## 2025-05-08 NOTE — TELEPHONE ENCOUNTER
Severino Lozano  1955  [phone]    Medication Refill    Medication needing refilled: Entresto    Dosage of the medication: 24-26 mg    How are you taking this medication (QD, BID, TID, QID, PRN): 1 bid    30 or 90 day supply called in: 90    Which Pharmacy are we sending the medication to?: [facility]    Last OV: 8.6.24-rjm    Next OV: 7.11.25-rjm    When will you run out of your medication: PT is completely out of med

## 2025-07-25 DIAGNOSIS — I42.9 CARDIOMYOPATHY, UNSPECIFIED TYPE (HCC): ICD-10-CM

## 2025-07-25 DIAGNOSIS — I10 ESSENTIAL HYPERTENSION: ICD-10-CM

## 2025-07-25 NOTE — TELEPHONE ENCOUNTER
Attempted to reach pt, left vm to call the office back. Pt was supposed to be evaluated in 02/2025. Pt no showed ov on -  02/27/25 04/29/25 07/11/25  We can not continue to fill without ov. At time of the call, [facility] had an opening prior to [facility] ov on  12/11.

## 2025-07-28 DIAGNOSIS — I10 ESSENTIAL HYPERTENSION: ICD-10-CM

## 2025-07-28 DIAGNOSIS — I42.9 CARDIOMYOPATHY, UNSPECIFIED TYPE (HCC): ICD-10-CM

## 2025-07-28 NOTE — TELEPHONE ENCOUNTER
Attempted to reach pt,someone answered phone but HIPAA form   . Pt was supposed to be evaluated in 2025. Pt no showed ov on -  25  We can not continue to fill without ov. At time of the call, [facility] had an opening prior to [facility] ov on  .

## 2025-07-30 DIAGNOSIS — I48.91 ATRIAL FIBRILLATION WITH RVR (HCC): ICD-10-CM

## 2025-07-31 ENCOUNTER — TELEPHONE (OUTPATIENT)
Dept: CARDIOLOGY CLINIC | Age: 70
End: 2025-07-31

## 2025-07-31 NOTE — TELEPHONE ENCOUNTER
Cannot continue to have missed appointments and then schedule 6 months later due to lack of availability.  This is not appropriate monitoring of his cardiac condition or his medications.  I reviewed his previous lab work.  Will keep December appointment but I will make it very clear that 1 more no-showed appointment will be grounds for dismissal.  Can bridge med prescriptions to December appointment.  Thank you

## 2025-07-31 NOTE — TELEPHONE ENCOUNTER
So to be clear are you wanting him to find another provider? He is scheduled at this time in December to see you

## 2025-07-31 NOTE — TELEPHONE ENCOUNTER
I cannot fill these medications which require frequent lab monitoring and compliance with routine clinic visits.  Defer to PCP.  Giving a courtesy call to their office would be appropriate to notify them that we are not actively following any longer and the patient should seek second opinion with a different cardiology practitioner.  Thank you

## 2025-07-31 NOTE — TELEPHONE ENCOUNTER
Dr. Escamilla.  Patient has 3 Rx requests.  However patient has cancelled and no showed appointments.   Was admitted in 2025.  Scheduled to follow up 25 and cancelled.  Due to needing refills, he was scheduled 25 but no showed.  Again scheduled 25 because needed refills and cancelled.  Of note: has also canceled follow ups with Dr. Restrepo.    Refills are for metoprolol, Xarelto and Entresto.      NESTOR Marcum tried reaching out to him.  Sister always answers.  However HIPAA is  and therefore can't speak with her.  Informed Severino, via My Chart that he needs to update HIPAA if his sister is the only way we can reach him.   No response as of yet.      Please advise on refills.  They are \"postponed\" for now.

## 2025-07-31 NOTE — TELEPHONE ENCOUNTER
Attempted to reach pt, left vm to call the office back. Pt was supposed to be evaluated in 02/2025. Pt no showed ov on -  02/27/25 04/29/25 07/11/25  We can not continue to fill without ov. At time of the call, NP[facility] had an opening prior to [facility] ov on  12/11.

## 2025-08-01 RX ORDER — RIVAROXABAN 20 MG/1
TABLET, FILM COATED ORAL
Qty: 30 TABLET | Refills: 3 | Status: SHIPPED | OUTPATIENT
Start: 2025-08-01

## 2025-08-01 RX ORDER — METOPROLOL SUCCINATE 200 MG/1
200 TABLET, EXTENDED RELEASE ORAL DAILY
Qty: 30 TABLET | Refills: 3 | Status: SHIPPED | OUTPATIENT
Start: 2025-08-01

## 2025-08-01 RX ORDER — SACUBITRIL AND VALSARTAN 24; 26 MG/1; MG/1
1 TABLET, FILM COATED ORAL 2 TIMES DAILY
Qty: 60 TABLET | Refills: 3 | Status: SHIPPED | OUTPATIENT
Start: 2025-08-01

## 2025-08-01 NOTE — TELEPHONE ENCOUNTER
Attempted to reach pt, left vm to call office back per [facility] note in phone encounter:       Cannot continue to have missed appointments and then schedule 6 months later due to lack of availability.  This is not appropriate monitoring of his cardiac condition or his medications.  I reviewed his previous lab work.  Will keep December appointment but I will make it very clear that 1 more no-showed appointment will be grounds for dismissal.  Can bridge med prescriptions to December appointment.  Thank you       Enough medication pended to get pt to ov

## 2025-08-01 NOTE — TELEPHONE ENCOUNTER
Attempted to reach pt, left vm to call office back per [facility] note in phone encounter:       Cannot continue to have missed appointments and then schedule 6 months later due to lack of availability.  This is not appropriate monitoring of his cardiac condition or his medications.  I reviewed his previous lab work.  Will keep December appointment but I will make it very clear that 1 more no-showed appointment will be grounds for dismissal.  Can bridge med prescriptions to December appointment.  Thank you       Enough medication pended to get pt to ov

## 2025-08-01 NOTE — TELEPHONE ENCOUNTER
Attempted to reach pt, left vm to call office back per [facility] note in phone encounter:       Cannot continue to have missed appointments and then schedule 6 months later due to lack of availability.  This is not appropriate monitoring of his cardiac condition or his medications.  I reviewed his previous lab work.  Will keep December appointment but I will make it very clear that 1 more no-showed appointment will be grounds for dismissal.  Can bridge med prescriptions to December appointment.  Thank you       Enough medication pended to get pt to ov

## 2025-08-01 NOTE — TELEPHONE ENCOUNTER
Pts sister returned call, informed her pt can update HIPAA form via my chart. Pts sister stated she will update it and call back.

## 2025-08-04 ENCOUNTER — TELEPHONE (OUTPATIENT)
Dept: ADMINISTRATIVE | Age: 70
End: 2025-08-04